# Patient Record
Sex: FEMALE | Race: WHITE
[De-identification: names, ages, dates, MRNs, and addresses within clinical notes are randomized per-mention and may not be internally consistent; named-entity substitution may affect disease eponyms.]

---

## 2018-07-30 ENCOUNTER — HOSPITAL ENCOUNTER (EMERGENCY)
Dept: HOSPITAL 8 - ED | Age: 23
LOS: 1 days | Discharge: HOME | End: 2018-07-31
Payer: MEDICAID

## 2018-07-30 VITALS — SYSTOLIC BLOOD PRESSURE: 95 MMHG | DIASTOLIC BLOOD PRESSURE: 50 MMHG

## 2018-07-30 VITALS — WEIGHT: 138.89 LBS | BODY MASS INDEX: 26.22 KG/M2 | HEIGHT: 61 IN

## 2018-07-30 DIAGNOSIS — O20.0: Primary | ICD-10-CM

## 2018-07-30 DIAGNOSIS — Z3A.01: ICD-10-CM

## 2018-07-30 LAB
BASOPHILS # BLD AUTO: 0.04 X10^3/UL (ref 0–0.1)
BASOPHILS NFR BLD AUTO: 1 % (ref 0–1)
EOSINOPHIL # BLD AUTO: 0.04 X10^3/UL (ref 0–0.4)
EOSINOPHIL NFR BLD AUTO: 1 % (ref 1–7)
ERYTHROCYTE [DISTWIDTH] IN BLOOD BY AUTOMATED COUNT: 14.8 % (ref 9.6–15.2)
LYMPHOCYTES # BLD AUTO: 2.01 X10^3/UL (ref 1–3.4)
LYMPHOCYTES NFR BLD AUTO: 31 % (ref 22–44)
MCH RBC QN AUTO: 28.5 PG (ref 27–34.8)
MCHC RBC AUTO-ENTMCNC: 33.8 G/DL (ref 32.4–35.8)
MCV RBC AUTO: 84.4 FL (ref 80–100)
MD: NO
MONOCYTES # BLD AUTO: 0.41 X10^3/UL (ref 0.2–0.8)
MONOCYTES NFR BLD AUTO: 6 % (ref 2–9)
NEUTROPHILS # BLD AUTO: 4.1 X10^3/UL (ref 1.8–6.8)
NEUTROPHILS NFR BLD AUTO: 62 % (ref 42–75)
PLATELET # BLD AUTO: 349 X10^3/UL (ref 130–400)
PMV BLD AUTO: 9 FL (ref 7.4–10.4)
RBC # BLD AUTO: 4.23 X10^6/UL (ref 3.82–5.3)

## 2018-07-30 PROCEDURE — 36415 COLL VENOUS BLD VENIPUNCTURE: CPT

## 2018-07-30 PROCEDURE — 84702 CHORIONIC GONADOTROPIN TEST: CPT

## 2018-07-30 PROCEDURE — 86901 BLOOD TYPING SEROLOGIC RH(D): CPT

## 2018-07-30 PROCEDURE — 76801 OB US < 14 WKS SINGLE FETUS: CPT

## 2018-07-30 PROCEDURE — 99285 EMERGENCY DEPT VISIT HI MDM: CPT

## 2018-07-30 PROCEDURE — 81001 URINALYSIS AUTO W/SCOPE: CPT

## 2018-07-30 PROCEDURE — 80053 COMPREHEN METABOLIC PANEL: CPT

## 2018-07-30 PROCEDURE — 85025 COMPLETE CBC W/AUTO DIFF WBC: CPT

## 2018-07-30 PROCEDURE — 87086 URINE CULTURE/COLONY COUNT: CPT

## 2018-07-31 LAB
ALBUMIN SERPL-MCNC: 3.5 G/DL (ref 3.4–5)
ALP SERPL-CCNC: 79 U/L (ref 45–117)
ALT SERPL-CCNC: 25 U/L (ref 12–78)
ANION GAP SERPL CALC-SCNC: 7 MMOL/L (ref 5–15)
BILIRUB SERPL-MCNC: 0.1 MG/DL (ref 0.2–1)
CALCIUM SERPL-MCNC: 8.8 MG/DL (ref 8.5–10.1)
CHLORIDE SERPL-SCNC: 107 MMOL/L (ref 98–107)
CREAT SERPL-MCNC: 0.62 MG/DL (ref 0.55–1.02)
CULTURE INDICATED?: YES
MICROSCOPIC: (no result)
PROT SERPL-MCNC: 7.7 G/DL (ref 6.4–8.2)

## 2018-09-07 ENCOUNTER — HOSPITAL ENCOUNTER (OUTPATIENT)
Facility: MEDICAL CENTER | Age: 23
End: 2018-09-10
Attending: EMERGENCY MEDICINE | Admitting: SPECIALIST
Payer: MEDICAID

## 2018-09-07 ENCOUNTER — HOSPITAL ENCOUNTER (OUTPATIENT)
Dept: RADIOLOGY | Facility: MEDICAL CENTER | Age: 23
End: 2018-09-07

## 2018-09-07 DIAGNOSIS — O20.0 THREATENED SPONTANEOUS ABORTION: ICD-10-CM

## 2018-09-07 DIAGNOSIS — O20.0 THREATENED MISCARRIAGE IN EARLY PREGNANCY: ICD-10-CM

## 2018-09-07 DIAGNOSIS — R10.2 PELVIC PAIN: Primary | ICD-10-CM

## 2018-09-07 LAB
BASOPHILS # BLD AUTO: 0.4 % (ref 0–1.8)
BASOPHILS # BLD: 0.02 K/UL (ref 0–0.12)
EOSINOPHIL # BLD AUTO: 0.02 K/UL (ref 0–0.51)
EOSINOPHIL NFR BLD: 0.4 % (ref 0–6.9)
ERYTHROCYTE [DISTWIDTH] IN BLOOD BY AUTOMATED COUNT: 42.8 FL (ref 35.9–50)
HCT VFR BLD AUTO: 32.4 % (ref 37–47)
HGB BLD-MCNC: 10.4 G/DL (ref 12–16)
IMM GRANULOCYTES # BLD AUTO: 0.01 K/UL (ref 0–0.11)
IMM GRANULOCYTES NFR BLD AUTO: 0.2 % (ref 0–0.9)
LYMPHOCYTES # BLD AUTO: 1.83 K/UL (ref 1–4.8)
LYMPHOCYTES NFR BLD: 32.9 % (ref 22–41)
MCH RBC QN AUTO: 27.7 PG (ref 27–33)
MCHC RBC AUTO-ENTMCNC: 32.1 G/DL (ref 33.6–35)
MCV RBC AUTO: 86.4 FL (ref 81.4–97.8)
MONOCYTES # BLD AUTO: 0.38 K/UL (ref 0–0.85)
MONOCYTES NFR BLD AUTO: 6.8 % (ref 0–13.4)
NEUTROPHILS # BLD AUTO: 3.31 K/UL (ref 2–7.15)
NEUTROPHILS NFR BLD: 59.3 % (ref 44–72)
NRBC # BLD AUTO: 0 K/UL
NRBC BLD-RTO: 0 /100 WBC
PLATELET # BLD AUTO: 275 K/UL (ref 164–446)
PMV BLD AUTO: 10.9 FL (ref 9–12.9)
RBC # BLD AUTO: 3.75 M/UL (ref 4.2–5.4)
WBC # BLD AUTO: 5.6 K/UL (ref 4.8–10.8)

## 2018-09-07 PROCEDURE — 85025 COMPLETE CBC W/AUTO DIFF WBC: CPT

## 2018-09-07 PROCEDURE — G0378 HOSPITAL OBSERVATION PER HR: HCPCS

## 2018-09-07 PROCEDURE — A9270 NON-COVERED ITEM OR SERVICE: HCPCS | Performed by: EMERGENCY MEDICINE

## 2018-09-07 PROCEDURE — 700105 HCHG RX REV CODE 258: Performed by: SPECIALIST

## 2018-09-07 PROCEDURE — 96375 TX/PRO/DX INJ NEW DRUG ADDON: CPT

## 2018-09-07 PROCEDURE — 96376 TX/PRO/DX INJ SAME DRUG ADON: CPT

## 2018-09-07 PROCEDURE — 700111 HCHG RX REV CODE 636 W/ 250 OVERRIDE (IP): Performed by: EMERGENCY MEDICINE

## 2018-09-07 PROCEDURE — 99285 EMERGENCY DEPT VISIT HI MDM: CPT

## 2018-09-07 PROCEDURE — 700102 HCHG RX REV CODE 250 W/ 637 OVERRIDE(OP): Performed by: EMERGENCY MEDICINE

## 2018-09-07 PROCEDURE — 96374 THER/PROPH/DIAG INJ IV PUSH: CPT

## 2018-09-07 PROCEDURE — 700111 HCHG RX REV CODE 636 W/ 250 OVERRIDE (IP): Performed by: SPECIALIST

## 2018-09-07 RX ORDER — ONDANSETRON 2 MG/ML
4 INJECTION INTRAMUSCULAR; INTRAVENOUS ONCE
Status: COMPLETED | OUTPATIENT
Start: 2018-09-07 | End: 2018-09-07

## 2018-09-07 RX ORDER — ONDANSETRON 2 MG/ML
4 INJECTION INTRAMUSCULAR; INTRAVENOUS EVERY 4 HOURS PRN
Status: DISCONTINUED | OUTPATIENT
Start: 2018-09-07 | End: 2018-09-10 | Stop reason: HOSPADM

## 2018-09-07 RX ORDER — MORPHINE SULFATE 10 MG/ML
2 INJECTION, SOLUTION INTRAMUSCULAR; INTRAVENOUS ONCE
Status: COMPLETED | OUTPATIENT
Start: 2018-09-07 | End: 2018-09-07

## 2018-09-07 RX ORDER — MORPHINE SULFATE 10 MG/ML
2 INJECTION, SOLUTION INTRAMUSCULAR; INTRAVENOUS
Status: DISCONTINUED | OUTPATIENT
Start: 2018-09-07 | End: 2018-09-07

## 2018-09-07 RX ORDER — ACETAMINOPHEN 325 MG/1
650 TABLET ORAL ONCE
Status: COMPLETED | OUTPATIENT
Start: 2018-09-07 | End: 2018-09-07

## 2018-09-07 RX ORDER — SODIUM CHLORIDE, SODIUM LACTATE, POTASSIUM CHLORIDE, CALCIUM CHLORIDE 600; 310; 30; 20 MG/100ML; MG/100ML; MG/100ML; MG/100ML
INJECTION, SOLUTION INTRAVENOUS CONTINUOUS
Status: DISCONTINUED | OUTPATIENT
Start: 2018-09-07 | End: 2018-09-10 | Stop reason: HOSPADM

## 2018-09-07 RX ORDER — MORPHINE SULFATE 10 MG/ML
5 INJECTION, SOLUTION INTRAMUSCULAR; INTRAVENOUS
Status: DISCONTINUED | OUTPATIENT
Start: 2018-09-07 | End: 2018-09-09

## 2018-09-07 RX ORDER — SODIUM CHLORIDE 9 MG/ML
INJECTION, SOLUTION INTRAVENOUS CONTINUOUS
Status: DISCONTINUED | OUTPATIENT
Start: 2018-09-07 | End: 2018-09-07

## 2018-09-07 RX ORDER — MORPHINE SULFATE 10 MG/ML
2 INJECTION, SOLUTION INTRAMUSCULAR; INTRAVENOUS
Status: DISCONTINUED | OUTPATIENT
Start: 2018-09-07 | End: 2018-09-10 | Stop reason: HOSPADM

## 2018-09-07 RX ADMIN — ONDANSETRON 4 MG: 2 INJECTION INTRAMUSCULAR; INTRAVENOUS at 13:25

## 2018-09-07 RX ADMIN — ACETAMINOPHEN 650 MG: 325 TABLET, FILM COATED ORAL at 12:37

## 2018-09-07 RX ADMIN — SODIUM CHLORIDE, POTASSIUM CHLORIDE, SODIUM LACTATE AND CALCIUM CHLORIDE: 600; 310; 30; 20 INJECTION, SOLUTION INTRAVENOUS at 21:23

## 2018-09-07 RX ADMIN — ONDANSETRON 4 MG: 2 INJECTION INTRAMUSCULAR; INTRAVENOUS at 21:09

## 2018-09-07 RX ADMIN — MORPHINE SULFATE 2 MG: 10 INJECTION INTRAVENOUS at 13:26

## 2018-09-07 RX ADMIN — MORPHINE SULFATE 2 MG: 10 INJECTION INTRAVENOUS at 17:46

## 2018-09-07 RX ADMIN — SODIUM CHLORIDE: 9 INJECTION, SOLUTION INTRAVENOUS at 17:46

## 2018-09-07 RX ADMIN — MORPHINE SULFATE 2 MG: 10 INJECTION INTRAVENOUS at 21:08

## 2018-09-07 ASSESSMENT — COGNITIVE AND FUNCTIONAL STATUS - GENERAL
SUGGESTED CMS G CODE MODIFIER DAILY ACTIVITY: CH
SUGGESTED CMS G CODE MODIFIER MOBILITY: CH
MOBILITY SCORE: 24
DAILY ACTIVITIY SCORE: 24

## 2018-09-07 ASSESSMENT — PAIN SCALES - GENERAL
PAINLEVEL_OUTOF10: 0
PAINLEVEL_OUTOF10: 3
PAINLEVEL_OUTOF10: 7
PAINLEVEL_OUTOF10: 7

## 2018-09-07 ASSESSMENT — PATIENT HEALTH QUESTIONNAIRE - PHQ9
2. FEELING DOWN, DEPRESSED, IRRITABLE, OR HOPELESS: NOT AT ALL
SUM OF ALL RESPONSES TO PHQ9 QUESTIONS 1 AND 2: 0
1. LITTLE INTEREST OR PLEASURE IN DOING THINGS: NOT AT ALL

## 2018-09-07 ASSESSMENT — LIFESTYLE VARIABLES
ALCOHOL_USE: NO
EVER_SMOKED: YES

## 2018-09-07 NOTE — ED TRIAGE NOTES
Patient arrived from [facility] as transfer per request of patients OB/GYN Dr. Reyes.   Patient had ultrasound and pelvic exam completed PTA with large clot removed. Patient arrives alert and oriented x4, placed on cardiac monitors,VSS MD to evaluate. Patient reports bleeding has slowed down however, cramping continues.

## 2018-09-07 NOTE — ED PROVIDER NOTES
ED Provider Note    Scribed for Razia Salcido M.D. by Samson Marcus. 9/7/2018, 12:18 PM.    Primary care provider: No primary care provider on file.  Means of arrival: Ambulance  History obtained from: Patient  History limited by: None    CHIEF COMPLAINT  Chief Complaint   Patient presents with   • Vaginal Bleeding     transfer from [facility] for miscarriage, 14 weeks pregnant bleeding since this morning     GODWIN Troncoso is a 22 y.o. female who presents to the Emergency Department for vaginal bleeding.  She has a history of a pregnancy twin gestation.  The patient had an ultrasound July 27th that showed twin pregnancy at 5 weeks, subchorionic hemorroghage with slow fetal heart rate.  August 28 follow up US showed fetal heart rate of one baby had a heart rate of 107 at 10 weeks 2 days and second twin showed no fetal heart rate, no subchorionic hemorrhage noted.  US today shows 11 week 3 day pregnancy with small subchorionic hemorrhage with drop of ECG.  The patient states she was seen in [place] removed a large fist-sized clot earlier today Her Hemoglobin at the transferring facility today was 11.      REVIEW OF SYSTEMS  Pertinent positives include vaginal bleeding. Pertinent negatives include no fever. All other systems reviewed and negative.     C.    PAST MEDICAL HISTORY   None Noted    SURGICAL HISTORY  patient denies any surgical history    SOCIAL HISTORY  Social History   Substance Use Topics   • Smoking status: Never Smoker   • Smokeless tobacco: Never Used   • Alcohol use No      History   Drug Use No       FAMILY HISTORY  History reviewed. No pertinent family history.    CURRENT MEDICATIONS  No current facility-administered medications for this encounter.     Current Outpatient Prescriptions:   •  Prenatal MV-Min-Fe Fum-FA-DHA (PRENATAL 1 PO), Take 1 Tab by mouth every evening., Disp: , Rfl:     ALLERGIES  No Known Allergies    PHYSICAL EXAM  VITAL SIGNS: /63   Pulse 96   Temp  35.8 °C (96.5 °F)   Resp 18   Wt 65.8 kg (145 lb)   LMP 06/15/2018 (Exact Date)   SpO2 99%     Constitutional:  Laying in bed, comfortable, able to answer questions  HENT: Nose is normal in appearance without rhinorrhea, external ears are normal,  moist mucous membranes  Eyes: Anicteric,  pupils are equal round and reactive, there is no conjunctival drainage or pallor   Neck: The trachea is midline, there is no obvious mass or meningeal signs  Cardiovascular: Equal radial pulsation.  Thorax & Lungs: Respiratory rate and effort are normal. There is normal chest excursion with respiration.  Abdomen: Abdomen is normal in appearance.  :  No CVA tenderness to palpation.  Minimal active bleeding and no tissue in the os.  Musculoskeletal: No deformities noted in all 4 extremities. Actively moves all 4 extremities  Skin: Visualized skin is warm, no erythema, no rash.  Neurologic:  Cranial nerves II through XII are intact there is no focal abnormality noted.  Psychiatric: Normal mood and mentation     DIAGNOSTIC STUDIES / PROCEDURES      LABS  Results for orders placed or performed during the hospital encounter of 09/07/18   CBC WITH DIFFERENTIAL   Result Value Ref Range    WBC 5.6 4.8 - 10.8 K/uL    RBC 3.75 (L) 4.20 - 5.40 M/uL    Hemoglobin 10.4 (L) 12.0 - 16.0 g/dL    Hematocrit 32.4 (L) 37.0 - 47.0 %    MCV 86.4 81.4 - 97.8 fL    MCH 27.7 27.0 - 33.0 pg    MCHC 32.1 (L) 33.6 - 35.0 g/dL    RDW 42.8 35.9 - 50.0 fL    Platelet Count 275 164 - 446 K/uL    MPV 10.9 9.0 - 12.9 fL    Neutrophils-Polys 59.30 44.00 - 72.00 %    Lymphocytes 32.90 22.00 - 41.00 %    Monocytes 6.80 0.00 - 13.40 %    Eosinophils 0.40 0.00 - 6.90 %    Basophils 0.40 0.00 - 1.80 %    Immature Granulocytes 0.20 0.00 - 0.90 %    Nucleated RBC 0.00 /100 WBC    Neutrophils (Absolute) 3.31 2.00 - 7.15 K/uL    Lymphs (Absolute) 1.83 1.00 - 4.80 K/uL    Monos (Absolute) 0.38 0.00 - 0.85 K/uL    Eos (Absolute) 0.02 0.00 - 0.51 K/uL    Baso (Absolute)  0.02 0.00 - 0.12 K/uL    Immature Granulocytes (abs) 0.01 0.00 - 0.11 K/uL    NRBC (Absolute) 0.00 K/uL       All labs reviewed by me.      RADIOLOGY  US-FOREIGN FILM ULTRASOUND   Final Result        The radiologist's interpretation of all radiological studies have been reviewed by me.    COURSE & MEDICAL DECISION MAKING  Nursing notes and vital signs were reviewed. (See chart for details)  The patient's  records were reviewed by me, history was obtained from the patient.     The patient presents with vaginal bleeding, and the differential diagnosis includes but is not limited to miscarriage of one twin and threatened miscarriage of second.      12:18 PM Patient presents with vaginal bleeding, was evaluated at [facility] and informed of ultrasound results.  The patient was informed that we would consult with Dr. Reyes would like her to be admitted to the hospital for observation.  She agrees with plan of care at this time.    12:25 PM I discussed the patient's case and the above findings with Dr. Reyes (OB/GYN) who was updated on the patient's status and agrees to see the patient, I have written holding orders for him.  She will have serial blood draws and bleeding and cramping monitored.      DISPOSITION:  Patient will be admitted to Dr. Reyes in guarded condition.     FINAL IMPRESSION  1. Threatened miscarriage in early pregnancy          ISamson (Scribisabel), am scribing for, and in the presence of, Razia Salcido M.D..    Electronically signed by: Samson Marcus (Maribeth), 9/7/2018    IRazia M.D. personally performed the services described in this documentation, as scribed by Samson Marcus in my presence, and it is both accurate and complete.    The note accurately reflects work and decisions made by me.  Razia Salcido  9/7/2018  5:43 PM

## 2018-09-07 NOTE — ED NOTES
The Medication Reconciliation process has been completed by interviewing the patient    Allergies have been reviewed  Antibiotic use in 30 days - one dose of an unknown ABX about 3 weeks ago.     Home Pharmacy:  [facility]

## 2018-09-07 NOTE — ED NOTES
"Patient c/o increase cramping and \"liquid leaking\", Dr. Salcido notified, orders received and medicated as per order.  Dr. Salcido at bedside and pelvic exam completed, no bleeding or drainage noted at this time.  Will continue to monitor.  "

## 2018-09-08 ENCOUNTER — APPOINTMENT (OUTPATIENT)
Dept: RADIOLOGY | Facility: MEDICAL CENTER | Age: 23
End: 2018-09-08
Attending: SPECIALIST
Payer: MEDICAID

## 2018-09-08 LAB
ANION GAP SERPL CALC-SCNC: 6 MMOL/L (ref 0–11.9)
BASOPHILS # BLD AUTO: 0.2 % (ref 0–1.8)
BASOPHILS # BLD: 0.01 K/UL (ref 0–0.12)
BUN SERPL-MCNC: 6 MG/DL (ref 8–22)
CALCIUM SERPL-MCNC: 8.5 MG/DL (ref 8.5–10.5)
CHLORIDE SERPL-SCNC: 105 MMOL/L (ref 96–112)
CO2 SERPL-SCNC: 23 MMOL/L (ref 20–33)
CREAT SERPL-MCNC: 0.51 MG/DL (ref 0.5–1.4)
EOSINOPHIL # BLD AUTO: 0.04 K/UL (ref 0–0.51)
EOSINOPHIL NFR BLD: 0.9 % (ref 0–6.9)
ERYTHROCYTE [DISTWIDTH] IN BLOOD BY AUTOMATED COUNT: 42.7 FL (ref 35.9–50)
GLUCOSE SERPL-MCNC: 95 MG/DL (ref 65–99)
HCT VFR BLD AUTO: 29.6 % (ref 37–47)
HGB BLD-MCNC: 9.8 G/DL (ref 12–16)
IMM GRANULOCYTES # BLD AUTO: 0.01 K/UL (ref 0–0.11)
IMM GRANULOCYTES NFR BLD AUTO: 0.2 % (ref 0–0.9)
LYMPHOCYTES # BLD AUTO: 1.43 K/UL (ref 1–4.8)
LYMPHOCYTES NFR BLD: 33.9 % (ref 22–41)
MAGNESIUM SERPL-MCNC: 2 MG/DL (ref 1.5–2.5)
MCH RBC QN AUTO: 28.3 PG (ref 27–33)
MCHC RBC AUTO-ENTMCNC: 33.1 G/DL (ref 33.6–35)
MCV RBC AUTO: 85.5 FL (ref 81.4–97.8)
MONOCYTES # BLD AUTO: 0.3 K/UL (ref 0–0.85)
MONOCYTES NFR BLD AUTO: 7.1 % (ref 0–13.4)
NEUTROPHILS # BLD AUTO: 2.43 K/UL (ref 2–7.15)
NEUTROPHILS NFR BLD: 57.7 % (ref 44–72)
NRBC # BLD AUTO: 0 K/UL
NRBC BLD-RTO: 0 /100 WBC
PLATELET # BLD AUTO: 228 K/UL (ref 164–446)
PMV BLD AUTO: 11 FL (ref 9–12.9)
POTASSIUM SERPL-SCNC: 3.9 MMOL/L (ref 3.6–5.5)
RBC # BLD AUTO: 3.46 M/UL (ref 4.2–5.4)
SODIUM SERPL-SCNC: 134 MMOL/L (ref 135–145)
WBC # BLD AUTO: 4.2 K/UL (ref 4.8–10.8)

## 2018-09-08 PROCEDURE — A9270 NON-COVERED ITEM OR SERVICE: HCPCS | Performed by: SPECIALIST

## 2018-09-08 PROCEDURE — 96376 TX/PRO/DX INJ SAME DRUG ADON: CPT

## 2018-09-08 PROCEDURE — 700102 HCHG RX REV CODE 250 W/ 637 OVERRIDE(OP): Performed by: SPECIALIST

## 2018-09-08 PROCEDURE — G0378 HOSPITAL OBSERVATION PER HR: HCPCS

## 2018-09-08 PROCEDURE — 80048 BASIC METABOLIC PNL TOTAL CA: CPT

## 2018-09-08 PROCEDURE — 700105 HCHG RX REV CODE 258: Performed by: SPECIALIST

## 2018-09-08 PROCEDURE — 36415 COLL VENOUS BLD VENIPUNCTURE: CPT

## 2018-09-08 PROCEDURE — 76815 OB US LIMITED FETUS(S): CPT

## 2018-09-08 PROCEDURE — 83735 ASSAY OF MAGNESIUM: CPT

## 2018-09-08 PROCEDURE — 85025 COMPLETE CBC W/AUTO DIFF WBC: CPT

## 2018-09-08 PROCEDURE — 700111 HCHG RX REV CODE 636 W/ 250 OVERRIDE (IP): Performed by: SPECIALIST

## 2018-09-08 RX ORDER — ACETAMINOPHEN 325 MG/1
650 TABLET ORAL EVERY 6 HOURS PRN
Status: DISCONTINUED | OUTPATIENT
Start: 2018-09-08 | End: 2018-09-10 | Stop reason: HOSPADM

## 2018-09-08 RX ADMIN — SODIUM CHLORIDE, POTASSIUM CHLORIDE, SODIUM LACTATE AND CALCIUM CHLORIDE: 600; 310; 30; 20 INJECTION, SOLUTION INTRAVENOUS at 22:17

## 2018-09-08 RX ADMIN — ONDANSETRON 4 MG: 2 INJECTION INTRAMUSCULAR; INTRAVENOUS at 18:14

## 2018-09-08 RX ADMIN — SODIUM CHLORIDE, POTASSIUM CHLORIDE, SODIUM LACTATE AND CALCIUM CHLORIDE: 600; 310; 30; 20 INJECTION, SOLUTION INTRAVENOUS at 10:16

## 2018-09-08 RX ADMIN — MORPHINE SULFATE 2 MG: 10 INJECTION INTRAVENOUS at 08:06

## 2018-09-08 RX ADMIN — MORPHINE SULFATE 2 MG: 10 INJECTION INTRAVENOUS at 15:21

## 2018-09-08 RX ADMIN — ACETAMINOPHEN 650 MG: 325 TABLET, FILM COATED ORAL at 18:14

## 2018-09-08 RX ADMIN — ACETAMINOPHEN 650 MG: 325 TABLET, FILM COATED ORAL at 12:25

## 2018-09-08 ASSESSMENT — PAIN SCALES - GENERAL
PAINLEVEL_OUTOF10: 0
PAINLEVEL_OUTOF10: 5
PAINLEVEL_OUTOF10: 3
PAINLEVEL_OUTOF10: 0
PAINLEVEL_OUTOF10: 0
PAINLEVEL_OUTOF10: 4
PAINLEVEL_OUTOF10: 0
PAINLEVEL_OUTOF10: 5

## 2018-09-08 NOTE — PROGRESS NOTES
Pt resting in bed throughout the morning. Complaining of headache and abdominal pain 5/10, administered medications. Friend is at bedside. No other complaints or signs of distress at this time. Hourly rounding in place.

## 2018-09-08 NOTE — PROGRESS NOTES
Dr. Reyes at bedside, aware of patient low BP, patient is asymptomatic. Patient c/o cramping, vaginal bleeding very minimal. Will medicate per MAR and offer heat pack. Will continue to monitor.

## 2018-09-08 NOTE — PROGRESS NOTES
Bedside report received with RN. Pt resting in bed, up to bathroom. Complaining of cramping in right lower quadrant and headache, 4/10 pain, medication administered. No other complaints or signs of distress. Call light and belongings within reach. Bed locked and in lowest position.

## 2018-09-08 NOTE — PROGRESS NOTES
The patient has had intermittent pelvic cramping pain. She just finished having an abdominal OB ultrasound and the report is not yet available but I was informed that this study does reveal fetal cardiac activity. The patient currently says she is having less pelvic cramping pain compared to when I saw her earlier today. She says that since yesterday her vaginal bleeding has been minimal to absent. The patient has at times been slightly hypotensive but not tachycardic. Her hemoglobin yesterday morning was 10.4 g/dL and her hemoglobin early this morning is 9.8 g/dL. Currently the patient appears well-developed and well-nourished and relaxed and alert and comfortable and in no apparent distress.  I explained to her that because ultrasound reveals that there is still a heartbeat and because her cramping seems to be somewhat less and because her vaginal bleeding appears to have abated at least for the time being that I would not recommend at this point in time that we proceed with any intervention such as D&C or D&E. I explained to her that I am hopeful am optimistic that her symptoms will not recur. I explained to her however that given the fact that she has had the symptoms (of heavy uterine bleeding and cramping pain) and because ultrasound has revealed some evidence of subchorionic hemorrhage that she is at increased risk for spontaneous miscarriage and she acknowledged this. I explained to her that I would like to have her at rest here in the hospital at least until tomorrow. She acknowledged this and agrees.  Roc Reyes M.D.

## 2018-09-08 NOTE — PROGRESS NOTES
Sadia is a very pleasant 22 year old primipara (para 1, with one previous vaginal delivery, and her son is 3 years old) who is pregnant and today she is 12 weeks and 0 days gestation. I last saw her when she came to the office 9 days ago, on August 29, 2018. This was her first visit with me. She told me that earlier this year she had three spontaneous abortions. She began having very heavy vaginal bleeding accompanied by pelvic cramping pain, and she she went to the ER in [place]. I was contacted by the ER physician at [facility] and she was transferred to [facility]. I was then contacted by Dr. Salcido here at [facility]. The patient tells me this afternoon that her bleeding and cramping pain is now much less than what it had been earlier today. The patient's vital signs are stable and she is afebrile and she appears well developed and well nourished and relaxed and alert and comfortable and in no apparent distress. Of note, I reviewed the images from the trans abdominal ultrasound performed at [facility]. It appears that there is an appropriate amount of amniotic fluid and that fetal cardiac activity was identified. It appears that there was a small subchorionic hemorrhage.   The patient's hemoglobin here at [facility] is 10.4 grams per deciliter and her platelet count is normal at 275,000. Her WBC is normal at 5.6  I explained to the patient that given her symptoms that she is having at least a threatened SAB if not an inevitable SAB. She says that in the ER at [facility] the ER physician performed a speculum exam and she says that the ER physician told her that some of the blood appeared to be more clear fluid. I explained to the patient that this would suggest that spontaneous rupture of membranes occurred, and I explained to her that the fact that the images that I reviewed seemed to show an appropriate amount of amniotic fluid surrounding baby suggests that spontaneous rupture of membranes has not  occurred.   I explained to the patient that I would like to observe her in the hospital at least until tomorrow and that it is possible that she might go on to have a complete or incomplete miscarriage. I explained to her that if she has an incomplete miscarriage that I would then need to perform a D&E or D&C in the operating room and I discussed this with her and she acknowledged what I explained to her.   Roc Reyes M.D.

## 2018-09-09 PROCEDURE — 700111 HCHG RX REV CODE 636 W/ 250 OVERRIDE (IP): Performed by: SPECIALIST

## 2018-09-09 PROCEDURE — 700102 HCHG RX REV CODE 250 W/ 637 OVERRIDE(OP): Performed by: SPECIALIST

## 2018-09-09 PROCEDURE — A9270 NON-COVERED ITEM OR SERVICE: HCPCS | Performed by: SPECIALIST

## 2018-09-09 PROCEDURE — G0378 HOSPITAL OBSERVATION PER HR: HCPCS

## 2018-09-09 PROCEDURE — 96376 TX/PRO/DX INJ SAME DRUG ADON: CPT

## 2018-09-09 RX ORDER — OXYCODONE HYDROCHLORIDE AND ACETAMINOPHEN 5; 325 MG/1; MG/1
1-2 TABLET ORAL EVERY 4 HOURS PRN
Status: DISCONTINUED | OUTPATIENT
Start: 2018-09-09 | End: 2018-09-10 | Stop reason: HOSPADM

## 2018-09-09 RX ORDER — FERROUS SULFATE 325(65) MG
325 TABLET ORAL
Status: DISCONTINUED | OUTPATIENT
Start: 2018-09-10 | End: 2018-09-10 | Stop reason: HOSPADM

## 2018-09-09 RX ORDER — AMOXICILLIN 250 MG
1 CAPSULE ORAL 2 TIMES DAILY
Status: DISCONTINUED | OUTPATIENT
Start: 2018-09-09 | End: 2018-09-10 | Stop reason: HOSPADM

## 2018-09-09 RX ADMIN — MORPHINE SULFATE 2 MG: 10 INJECTION INTRAVENOUS at 11:22

## 2018-09-09 RX ADMIN — OXYCODONE HYDROCHLORIDE AND ACETAMINOPHEN 1 TABLET: 5; 325 TABLET ORAL at 13:19

## 2018-09-09 RX ADMIN — OXYCODONE HYDROCHLORIDE AND ACETAMINOPHEN 1 TABLET: 5; 325 TABLET ORAL at 20:21

## 2018-09-09 RX ADMIN — ACETAMINOPHEN 650 MG: 325 TABLET, FILM COATED ORAL at 07:48

## 2018-09-09 RX ADMIN — ONDANSETRON 4 MG: 2 INJECTION INTRAMUSCULAR; INTRAVENOUS at 21:35

## 2018-09-09 RX ADMIN — ONDANSETRON 4 MG: 2 INJECTION INTRAMUSCULAR; INTRAVENOUS at 11:25

## 2018-09-09 RX ADMIN — SENNOSIDES AND DOCUSATE SODIUM 1 TABLET: 8.6; 5 TABLET ORAL at 18:57

## 2018-09-09 ASSESSMENT — PAIN SCALES - GENERAL
PAINLEVEL_OUTOF10: 4
PAINLEVEL_OUTOF10: 3
PAINLEVEL_OUTOF10: 3
PAINLEVEL_OUTOF10: 0
PAINLEVEL_OUTOF10: 0
PAINLEVEL_OUTOF10: 5
PAINLEVEL_OUTOF10: 0
PAINLEVEL_OUTOF10: 7
PAINLEVEL_OUTOF10: 5
PAINLEVEL_OUTOF10: 6

## 2018-09-09 NOTE — CARE PLAN
Problem: Safety  Goal: Will remain free from injury    Intervention: Provide assistance with mobility  reinterated patient to ask for assistance if needed to the bathroom or ambulate.  Call button if any signs of increased bleeding and/or contractions or cramping increases.

## 2018-09-09 NOTE — CARE PLAN
Problem: Communication  Goal: The ability to communicate needs accurately and effectively will improve    Intervention: Educate patient and significant other/support system about the plan of care, procedures, treatments, medications and allow for questions  Educate the patient on her medications and LR IV fluids. The importance of putting on call light with any increased pain or bleeding beyond baseline.

## 2018-09-09 NOTE — PROGRESS NOTES
Assumed care report received. Pt resting in bed  5/10 pain in head and cramping in lower abdomen. Gave medication See MAR. Complains of clots 3-4 for total size of silver dollar noticed in underwear. Notified OB MD. No new orders at this time.  Plan of care discussed no other concerns at this time. Call light within reach. Fall precautions in place.

## 2018-09-09 NOTE — PROGRESS NOTES
Complaining of worsening cramps in lower abdomen and sacrum. States similar to pain on admission but less severe. Gave medication see MAR. Discussed previous miscarriages and emotional status. Reassurance given. No other concerns at this time. Will continue to monitor.

## 2018-09-09 NOTE — CARE PLAN
Problem: Venous Thromboembolism (VTW)/Deep Vein Thrombosis (DVT) Prevention:  Goal: Patient will participate in Venous Thrombosis (VTE)/Deep Vein Thrombosis (DVT)Prevention Measures    Intervention: Assess and monitor for anticoagulation complications  Ambulatory, up self tolerates well. VTE risk 0. Order for SCD's received from MD. SCD's in place. Education provided on prevention of VTE. Verbalized understanding.

## 2018-09-09 NOTE — PROGRESS NOTES
ECG:  SR 16/08/36..No results found for: INR  No results found for: POCINR   no c/o cramping, bleeding, contractions.

## 2018-09-09 NOTE — PROGRESS NOTES
Sadia is a very pleasant 22 year old primipara (she has had 1 previous vaginal delivery) who was admitted 2 days ago. She is today 12 weeks and 2 days gestation. She was admitted because of very heavy uterine bleeding. Ultrasounds have showed 2 small subchorionic hemorrhages. Since admission her bleeding has been minimal. She tells me that very early this morning she did have a very small amount of bleeding which she described as spotting. She says she has had intermittent pelvic cramping pain. She says currently she is having no bleeding and no pelvic cramping pain. Yesterday's abdominal ultrasound revealed a single live intrauterine gestation with crown-rump length measurements corresponding to a gestational age of 12 weeks and 3 days gestation. This ultrasound revealed that there were 2 small subchorionic hemorrhages (measuring 2.8 x 0.6 x 2.6 cm and 2.7 x 1.1 x 0.8 cm). I personally reviewed the images from this ultrasound study and these images reveal that the fetus is surrounded by an appropriate amount of amniotic fluid. The report states that the fetal heart rate was 163 bpm.  The patient's vital signs are stable and she is afebrile. She appears well-developed and well-nourished and relaxed and alert and comfortable and in no apparent distress.  The patient's hemoglobin yesterday morning was 9.8 g/dL. This represents a decrease from 10.4 g/dL the previous day.  I spoke with the patient today about whether or not to discharge her home from the hospital. After our discussions she told me that she would like to stay in the hospital until tomorrow morning but that she would be agreeable to being discharged home tomorrow morning. I explained to her that she is anemic and that when we discharged her home that I would like to discharge her home with prescription for iron sulfate to treat her anemia and that I would also because of her anemia recommend that she continue to take daily prenatal vitamins. I explained  to her also that I would like her for her to take a stool softener 2 times per day.  Asked me to her also that I would like to switch her from the intravenous morphine that she has been receiving intermittently as treatment for her intermittent pelvic cramping pain to an oral analgesics such as Percocet and she agreed with this.  I explained to her that when we discharge her home tomorrow morning than I will write for her prescription for Percocet.  Roc Reyes M.D.

## 2018-09-09 NOTE — PROGRESS NOTES
Received report from SIDDHARTH Waddell.  Review of patients condition. Her baseline of pain and cramping due to possible miscarriage. All questions answered. Patient in bed with no c/o pain or bleeding.

## 2018-09-10 VITALS
RESPIRATION RATE: 16 BRPM | HEART RATE: 79 BPM | HEIGHT: 61 IN | WEIGHT: 159.61 LBS | DIASTOLIC BLOOD PRESSURE: 57 MMHG | TEMPERATURE: 97.7 F | SYSTOLIC BLOOD PRESSURE: 94 MMHG | BODY MASS INDEX: 30.14 KG/M2 | OXYGEN SATURATION: 97 %

## 2018-09-10 PROBLEM — O20.8 SUBCHORIONIC HEMORRHAGE IN FIRST TRIMESTER: Status: ACTIVE | Noted: 2018-09-10

## 2018-09-10 PROBLEM — O46.8X1 SUBCHORIONIC HEMORRHAGE IN FIRST TRIMESTER: Status: ACTIVE | Noted: 2018-09-10

## 2018-09-10 PROBLEM — D62 ACUTE POST-HEMORRHAGIC ANEMIA: Status: ACTIVE | Noted: 2018-09-10

## 2018-09-10 PROBLEM — O20.0 THREATENED SPONTANEOUS ABORTION: Status: ACTIVE | Noted: 2018-09-10

## 2018-09-10 PROBLEM — O41.8X10 SUBCHORIONIC HEMORRHAGE IN FIRST TRIMESTER: Status: ACTIVE | Noted: 2018-09-10

## 2018-09-10 LAB
ERYTHROCYTE [DISTWIDTH] IN BLOOD BY AUTOMATED COUNT: 41.5 FL (ref 35.9–50)
HCT VFR BLD AUTO: 31.1 % (ref 37–47)
HGB BLD-MCNC: 9.8 G/DL (ref 12–16)
MCH RBC QN AUTO: 27.3 PG (ref 27–33)
MCHC RBC AUTO-ENTMCNC: 31.5 G/DL (ref 33.6–35)
MCV RBC AUTO: 86.6 FL (ref 81.4–97.8)
PLATELET # BLD AUTO: 257 K/UL (ref 164–446)
PMV BLD AUTO: 10.8 FL (ref 9–12.9)
RBC # BLD AUTO: 3.59 M/UL (ref 4.2–5.4)
WBC # BLD AUTO: 5.4 K/UL (ref 4.8–10.8)

## 2018-09-10 PROCEDURE — 700102 HCHG RX REV CODE 250 W/ 637 OVERRIDE(OP): Performed by: SPECIALIST

## 2018-09-10 PROCEDURE — 85027 COMPLETE CBC AUTOMATED: CPT

## 2018-09-10 PROCEDURE — 36415 COLL VENOUS BLD VENIPUNCTURE: CPT

## 2018-09-10 PROCEDURE — 700111 HCHG RX REV CODE 636 W/ 250 OVERRIDE (IP): Performed by: SPECIALIST

## 2018-09-10 PROCEDURE — G0378 HOSPITAL OBSERVATION PER HR: HCPCS

## 2018-09-10 PROCEDURE — A9270 NON-COVERED ITEM OR SERVICE: HCPCS | Performed by: SPECIALIST

## 2018-09-10 RX ORDER — OXYCODONE HYDROCHLORIDE AND ACETAMINOPHEN 5; 325 MG/1; MG/1
1-2 TABLET ORAL EVERY 4 HOURS PRN
Qty: 28 TAB | Refills: 0 | Status: SHIPPED | OUTPATIENT
Start: 2018-09-10 | End: 2018-09-17

## 2018-09-10 RX ORDER — SENNA AND DOCUSATE SODIUM 50; 8.6 MG/1; MG/1
1 TABLET, FILM COATED ORAL DAILY
Qty: 30 TAB | Refills: 11 | Status: SHIPPED | OUTPATIENT
Start: 2018-09-10 | End: 2020-03-03

## 2018-09-10 RX ORDER — FERROUS SULFATE 325(65) MG
325 TABLET ORAL
Qty: 30 TAB | Refills: 2 | Status: SHIPPED | OUTPATIENT
Start: 2018-09-10 | End: 2020-03-03

## 2018-09-10 RX ORDER — ONDANSETRON 4 MG/1
4 TABLET, ORALLY DISINTEGRATING ORAL EVERY 4 HOURS PRN
Status: DISCONTINUED | OUTPATIENT
Start: 2018-09-10 | End: 2018-09-10 | Stop reason: HOSPADM

## 2018-09-10 RX ORDER — ONDANSETRON 4 MG/1
4 TABLET, ORALLY DISINTEGRATING ORAL EVERY 6 HOURS PRN
Qty: 30 TAB | Refills: 0 | Status: SHIPPED | OUTPATIENT
Start: 2018-09-10 | End: 2020-02-16

## 2018-09-10 RX ADMIN — SENNOSIDES AND DOCUSATE SODIUM 1 TABLET: 8.6; 5 TABLET ORAL at 06:00

## 2018-09-10 RX ADMIN — ONDANSETRON 4 MG: 4 TABLET, ORALLY DISINTEGRATING ORAL at 10:25

## 2018-09-10 ASSESSMENT — PAIN SCALES - GENERAL
PAINLEVEL_OUTOF10: 1
PAINLEVEL_OUTOF10: 1

## 2018-09-10 NOTE — DISCHARGE INSTRUCTIONS
Discharge Instructions    Discharged to home by car with relative. Discharged via wheelchair, hospital escort: Yes.  Special equipment needed: Not Applicable    Be sure to schedule a follow-up appointment with your primary care doctor or any specialists as instructed.     Discharge Plan: Follow up with Dr. Reyes and call or contact his office if you have any increased vaginal bleeding or pelvic cramping pain.  Diet Plan: Discussed  Activity Level: Discussed  Smoking Cessation Offered: Patient Counseled  Confirmed Follow up Appointment: Appointment Scheduled  Confirmed Symptoms Management: Discussed  Medication Reconciliation Updated: Yes  Influenza Vaccine Indication: Patient Refuses    I understand that a diet low in cholesterol, fat, and sodium is recommended for good health. Unless I have been given specific instructions below for another diet, I accept this instruction as my diet prescription.   Other diet:     Special Instructions: None    · Is patient discharged on Warfarin / Coumadin?   No     Depression / Suicide Risk    As you are discharged from this [facility], it is important to learn how to keep safe from harming yourself.    Recognize the warning signs:  · Abrupt changes in personality, positive or negative- including increase in energy   · Giving away possessions  · Change in eating patterns- significant weight changes-  positive or negative  · Change in sleeping patterns- unable to sleep or sleeping all the time   · Unwillingness or inability to communicate  · Depression  · Unusual sadness, discouragement and loneliness  · Talk of wanting to die  · Neglect of personal appearance   · Rebelliousness- reckless behavior  · Withdrawal from people/activities they love  · Confusion- inability to concentrate     If you or a loved one observes any of these behaviors or has concerns about self-harm, here's what you can do:  · Talk about it- your feelings and reasons for harming yourself  · Remove  any means that you might use to hurt yourself (examples: pills, rope, extension cords, firearm)  · Get professional help from the community (Mental Health, Substance Abuse, psychological counseling)  · Do not be alone:Call your Safe Contact- someone whom you trust who will be there for you.  · Call your local CRISIS HOTLINE 245-6465 or 128-886-8990  · Call your local Children's Mobile Crisis Response Team [place] (566) 194-6104 or wwwBusap  · Call the toll free National Suicide Prevention Hotlines   · National Suicide Prevention Lifeline 919-706-KASZ (2094)  · National Hope Line Network 800-SUICIDE (693-2284)  Pregnancy and Anemia  Anemia is a condition in which the concentration of red blood cells or hemoglobin in the blood is below normal. Hemoglobin is a substance in red blood cells that carries oxygen to the tissues of the body. Anemia results in not enough oxygen reaching these tissues.  Anemia during pregnancy is common because the fetus uses more iron and folic acid as it is developing. Your body may not produce enough red blood cells because of this. Also, during pregnancy, the liquid part of the blood (plasma) increases by about 50%, and the red blood cells increase by only 25%. This lowers the concentration of the red blood cells and creates a natural anemia-like situation.  What are the causes?  The most common cause of anemia during pregnancy is not having enough iron in the body to make red blood cells (iron deficiency anemia). Other causes may include:  · Folic acid deficiency.  · Vitamin B12 deficiency.  · Certain prescription or over-the-counter medicines.  · Certain medical conditions or infections that destroy red blood cells.  · A low platelet count and bleeding caused by antibodies that go through the placenta to the fetus from the mother’s blood.  What are the signs or symptoms?  Mild anemia may not be noticeable. If it becomes severe, symptoms may  include:  · Tiredness.  · Shortness of breath, especially with exercise.  · Weakness.  · Fainting.  · Pale looking skin.  · Headaches.  · Feeling a fast or irregular heartbeat (palpitations).  How is this diagnosed?  The type of anemia is usually diagnosed from your family and medical history and blood tests.  How is this treated?  Treatment of anemia during pregnancy depends on the cause of the anemia. Treatment can include:  · Supplements of iron, vitamin B12, or folic acid.  · A blood transfusion. This may be needed if blood loss is severe.  · Hospitalization. This may be needed if there is significant continual blood loss.  · Dietary changes.  Follow these instructions at home:  · Follow your dietitian's or health care provider's dietary recommendations.  · Increase your vitamin C intake. This will help the stomach absorb more iron.  · Eat a diet rich in iron. This would include foods such as:  ¨ Liver.  ¨ Beef.  ¨ Whole grain bread.  ¨ Eggs.  ¨ Dried fruit.  · Take iron and vitamins as directed by your health care provider.  · Eat green leafy vegetables. These are a good source of folic acid.  Contact a health care provider if:  · You have frequent or lasting headaches.  · You are looking pale.  · You are bruising easily.  Get help right away if:  · You have extreme weakness, shortness of breath, or chest pain.  · You become dizzy or have trouble concentrating.  · You have heavy vaginal bleeding.  · You develop a rash.  · You have bloody or black, tarry stools.  · You faint.  · You vomit up blood.  · You vomit repeatedly.  · You have abdominal pain.  · You have a fever or persistent symptoms for more than 2-3 days.  · You have a fever and your symptoms suddenly get worse.  · You are dehydrated.  This information is not intended to replace advice given to you by your health care provider. Make sure you discuss any questions you have with your health care provider.  Document Released: 12/15/2001 Document Revised:  05/25/2017 Document Reviewed: 07/30/2014  [employer] Interactive Patient Education © 2017 [employer].    Ondansetron tablets  What is this medicine?  ONDANSETRON (on DAN se ashley) is used to treat nausea and vomiting caused by chemotherapy. It is also used to prevent or treat nausea and vomiting after surgery.  This medicine may be used for other purposes; ask your health care provider or pharmacist if you have questions.  COMMON BRAND NAME(S): Zofran  What should I tell my health care provider before I take this medicine?  They need to know if you have any of these conditions:  -heart disease  -history of irregular heartbeat  -liver disease  -low levels of magnesium or potassium in the blood  -an unusual or allergic reaction to ondansetron, granisetron, other medicines, foods, dyes, or preservatives  -pregnant or trying to get pregnant  -breast-feeding  How should I use this medicine?  Take this medicine by mouth with a glass of water. Follow the directions on your prescription label. Take your doses at regular intervals. Do not take your medicine more often than directed.  Talk to your pediatrician regarding the use of this medicine in children. Special care may be needed.  Overdosage: If you think you have taken too much of this medicine contact a poison control center or emergency room at once.  NOTE: This medicine is only for you. Do not share this medicine with others.  What if I miss a dose?  If you miss a dose, take it as soon as you can. If it is almost time for your next dose, take only that dose. Do not take double or extra doses.  What may interact with this medicine?  Do not take this medicine with any of the following medications:  -apomorphine  -certain medicines for fungal infections like fluconazole, itraconazole, ketoconazole, posaconazole, voriconazole  -cisapride  -dofetilide  -dronedarone  -pimozide  -thioridazine  -ziprasidone  This medicine may also interact with the following  medications:  -carbamazepine  -certain medicines for depression, anxiety, or psychotic disturbances  -fentanyl  -linezolid  -MAOIs like Carbex, Eldepryl, Marplan, Nardil, and Parnate  -methylene blue (injected into a vein)  -other medicines that prolong the QT interval (cause an abnormal heart rhythm)  -phenytoin  -rifampicin  -tramadol  This list may not describe all possible interactions. Give your health care provider a list of all the medicines, herbs, non-prescription drugs, or dietary supplements you use. Also tell them if you smoke, drink alcohol, or use illegal drugs. Some items may interact with your medicine.  What should I watch for while using this medicine?  Check with your doctor or health care professional right away if you have any sign of an allergic reaction.  What side effects may I notice from receiving this medicine?  Side effects that you should report to your doctor or health care professional as soon as possible:  -allergic reactions like skin rash, itching or hives, swelling of the face, lips or tongue  -breathing problems  -confusion  -dizziness  -fast or irregular heartbeat  -feeling faint or lightheaded, falls  -fever and chills  -loss of balance or coordination  -seizures  -sweating  -swelling of the hands or feet  -tightness in the chest  -tremors  -unusually weak or tired  Side effects that usually do not require medical attention (report to your doctor or health care professional if they continue or are bothersome):  -constipation or diarrhea  -headache  This list may not describe all possible side effects. Call your doctor for medical advice about side effects. You may report side effects to FDA at 0-557-FDA-7165.  Where should I keep my medicine?  Keep out of the reach of children.  Store between 2 and 30 degrees C (36 and 86 degrees F). Throw away any unused medicine after the expiration date.  NOTE: This sheet is a summary. It may not cover all possible information. If you have  questions about this medicine, talk to your doctor, pharmacist, or health care provider.  © 2018 Elsevier/Gold Standard (2014-09-24 16:27:45)    Acetaminophen; Oxycodone tablets  What is this medicine?  ACETAMINOPHEN; OXYCODONE (a set a DYLON daysi fen; ox i KOE done) is a pain reliever. It is used to treat moderate to severe pain.  This medicine may be used for other purposes; ask your health care provider or pharmacist if you have questions.  COMMON BRAND NAME(S): Endocet, Magnacet, Narvox, Percocet, Perloxx, Primalev, Primlev, Roxicet, Xolox  What should I tell my health care provider before I take this medicine?  They need to know if you have any of these conditions:  -brain tumor  -Crohn's disease, inflammatory bowel disease, or ulcerative colitis  -drug abuse or addiction  -head injury  -heart or circulation problems  -if you often drink alcohol  -kidney disease or problems going to the bathroom  -liver disease  -lung disease, asthma, or breathing problems  -an unusual or allergic reaction to acetaminophen, oxycodone, other opioid analgesics, other medicines, foods, dyes, or preservatives  -pregnant or trying to get pregnant  -breast-feeding  How should I use this medicine?  Take this medicine by mouth with a full glass of water. Follow the directions on the prescription label. You can take it with or without food. If it upsets your stomach, take it with food. Take your medicine at regular intervals. Do not take it more often than directed.  A special MedGuide will be given to you by the pharmacist with each prescription and refill. Be sure to read this information carefully each time.  Talk to your pediatrician regarding the use of this medicine in children. Special care may be needed.  Overdosage: If you think you have taken too much of this medicine contact a poison control center or emergency room at once.  NOTE: This medicine is only for you. Do not share this medicine with others.  What if I miss a dose?  If  you miss a dose, take it as soon as you can. If it is almost time for your next dose, take only that dose. Do not take double or extra doses.  What may interact with this medicine?  This medicine may interact with the following medications:  -alcohol  -antihistamines for allergy, cough and cold  -antiviral medicines for HIV or AIDS  -atropine  -certain antibiotics like clarithromycin, erythromycin, linezolid, rifampin  -certain medicines for anxiety or sleep  -certain medicines for bladder problems like oxybutynin, tolterodine  -certain medicines for depression like amitriptyline, fluoxetine, sertraline  -certain medicines for fungal infections like ketoconazole, itraconazole, voriconazole  -certain medicines for migraine headache like almotriptan, eletriptan, frovatriptan, naratriptan, rizatriptan, sumatriptan, zolmitriptan  -certain medicines for nausea or vomiting like dolasetron, ondansetron, palonosetron  -certain medicines for Parkinson's disease like benztropine, trihexyphenidyl  -certain medicines for seizures like phenobarbital, phenytoin, primidone  -certain medicines for stomach problems like dicyclomine, hyoscyamine  -certain medicines for travel sickness like scopolamine  -diuretics  -general anesthetics like halothane, isoflurane, methoxyflurane, propofol  -ipratropium  -local anesthetics like lidocaine, pramoxine, tetracaine  -MAOIs like Carbex, Eldepryl, Marplan, Nardil, and Parnate  -medicines that relax muscles for surgery  -methylene blue  -nilotinib  -other medicines with acetaminophen  -other narcotic medicines for pain or cough  -phenothiazines like chlorpromazine, mesoridazine, prochlorperazine, thioridazine  This list may not describe all possible interactions. Give your health care provider a list of all the medicines, herbs, non-prescription drugs, or dietary supplements you use. Also tell them if you smoke, drink alcohol, or use illegal drugs. Some items may interact with your  medicine.  What should I watch for while using this medicine?  Tell your doctor or health care professional if your pain does not go away, if it gets worse, or if you have new or a different type of pain. You may develop tolerance to the medicine. Tolerance means that you will need a higher dose of the medication for pain relief. Tolerance is normal and is expected if you take this medicine for a long time.  Do not suddenly stop taking your medicine because you may develop a severe reaction. Your body becomes used to the medicine. This does NOT mean you are addicted. Addiction is a behavior related to getting and using a drug for a non-medical reason. If you have pain, you have a medical reason to take pain medicine. Your doctor will tell you how much medicine to take. If your doctor wants you to stop the medicine, the dose will be slowly lowered over time to avoid any side effects.  There are different types of narcotic medicines (opiates). If you take more than one type at the same time or if you are taking another medicine that also causes drowsiness, you may have more side effects. Give your health care provider a list of all medicines you use. Your doctor will tell you how much medicine to take. Do not take more medicine than directed. Call emergency for help if you have problems breathing or unusual sleepiness.  Do not take other medicines that contain acetaminophen with this medicine. Always read labels carefully. If you have questions, ask your doctor or pharmacist.  If you take too much acetaminophen get medical help right away. Too much acetaminophen can be very dangerous and cause liver damage. Even if you do not have symptoms, it is important to get help right away.  You may get drowsy or dizzy. Do not drive, use machinery, or do anything that needs mental alertness until you know how this medicine affects you. Do not stand or sit up quickly, especially if you are an older patient. This reduces the risk  of dizzy or fainting spells. Alcohol may interfere with the effect of this medicine. Avoid alcoholic drinks.  The medicine will cause constipation. Try to have a bowel movement at least every 2 to 3 days. If you do not have a bowel movement for 3 days, call your doctor or health care professional.  Your mouth may get dry. Chewing sugarless gum or sucking hard candy, and drinking plenty or water may help. Contact your doctor if the problem does not go away or is severe.  What side effects may I notice from receiving this medicine?  Side effects that you should report to your doctor or health care professional as soon as possible:  -allergic reactions like skin rash, itching or hives, swelling of the face, lips, or tongue  -breathing problems  -confusion  -redness, blistering, peeling or loosening of the skin, including inside the mouth  -signs and symptoms of liver injury like dark yellow or brown urine; general ill feeling or flu-like symptoms; light-colored stools; loss of appetite; nausea; right upper belly pain; unusually weak or tired; yellowing of the eyes or skin  -signs and symptoms of low blood pressure like dizziness; feeling faint or lightheaded, falls; unusually weak or tired  -trouble passing urine or change in the amount of urine  Side effects that usually do not require medical attention (report to your doctor or health care professional if they continue or are bothersome):  -constipation  -dry mouth  -nausea, vomiting  -tiredness  This list may not describe all possible side effects. Call your doctor for medical advice about side effects. You may report side effects to FDA at 5-139-FDA-6656.  Where should I keep my medicine?  Keep out of the reach of children. This medicine can be abused. Keep your medicine in a safe place to protect it from theft. Do not share this medicine with anyone. Selling or giving away this medicine is dangerous and against the law.  This medicine may cause accidental overdose  and death if it taken by other adults, children, or pets. Mix any unused medicine with a substance like cat litter or coffee grounds. Then throw the medicine away in a sealed container like a sealed bag or a coffee can with a lid. Do not use the medicine after the expiration date.  Store at room temperature between 20 and 25 degrees C (68 and 77 degrees F).  NOTE: This sheet is a summary. It may not cover all possible information. If you have questions about this medicine, talk to your doctor, pharmacist, or health care provider.  © 2018 Elsevier/Gold Standard (2016-09-12 21:48:12)    Senna tablets or capsules  What is this medicine?  SENNA (SEN uh) is a laxative. This medicine is used to relieve constipation. It may also be used to empty and prepare the bowel for surgery or examination.  This medicine may be used for other purposes; ask your health care provider or pharmacist if you have questions.  COMMON BRAND NAME(S): Black Draught, Ex-Lax, Lax-Pills, Perdiem, Senexon, Senna, Senna-Lax, Senna-Tabs, Senna-Time, SennaGen, Sennatural, Senokot, Senokot Extra Strength, Senokot Xtra, SenoSol, SenoSol-X, Uni-Cenna  What should I tell my health care provider before I take this medicine?  They need to know if you have any of these conditions  -appendicitis  -stomach pain or blockage  -vomiting  -an unusual or allergic reaction to senna, other medicines, foods, dyes, or preservatives  -pregnant or trying to get pregnant  -breast-feeding  How should I use this medicine?  Take this medicine by mouth with a full glass of water. Follow the directions on the package. Always take with plenty of water. Take exactly as directed. Do not take your medicine more often than directed.  Talk to your pediatrician regarding the use of this medicine in children. While this medicine may be prescribed for children as young as 2 years for selected conditions, precautions do apply.  Overdosage: If you think you have taken too much of this  medicine contact a poison control center or emergency room at once.  NOTE: This medicine is only for you. Do not share this medicine with others.  What if I miss a dose?  If you miss a dose, take it as soon as you can. If it is almost time for your next dose, take only that dose. Do not take double or extra doses.  What may interact with this medicine?  Interactions are not expected. However, this medicine may affect the time other medicines stay in the stomach. It is best not to take this medicine within 1 to 2 hours of taking other medicines.  This list may not describe all possible interactions. Give your health care provider a list of all the medicines, herbs, non-prescription drugs, or dietary supplements you use. Also tell them if you smoke, drink alcohol, or use illegal drugs. Some items may interact with your medicine.  What should I watch for while using this medicine?  Do not use this medicine for longer than directed by your doctor or health care professional. This medicine can be habit-forming. Long-term use can make your body depend on the laxative for regular bowel movements, damage the bowel, cause malnutrition, and problems with the amounts of water and salts in your body. If your constipation keeps returning, check with your doctor or health care professional.  What side effects may I notice from receiving this medicine?  Side effects that you should report to your doctor or health care professional as soon as possible:  -diarrhea  -muscle weakness  -nausea, vomiting  -unusual weight loss  Side effects that usually do not require medical attention (report to your doctor or health care professional if they continue or are bothersome):  -bloating  -discolored urine  -lower stomach discomfort or cramps  This list may not describe all possible side effects. Call your doctor for medical advice about side effects. You may report side effects to FDA at 3-676-FDA-2416.  Where should I keep my  medicine?  Keep out of the reach of children.  Store at room temperature between 15 and 30 degrees C (59 and 86 degrees F). Keep container tightly closed. Throw away any unused medicine after the expiration date.  NOTE: This sheet is a summary. It may not cover all possible information. If you have questions about this medicine, talk to your doctor, pharmacist, or health care provider.  © 2018 Elsevier/Gold Standard (2016-06-04 07:17:15)

## 2018-09-10 NOTE — PROGRESS NOTES
Shift report from SIDDHARTH Jackson. Updated information related to pregnancy and bleeding and Pain management from Morphine to percocet PO. Pt comfortable. All questions answered

## 2018-09-10 NOTE — CARE PLAN
Problem: Venous Thromboembolism (VTW)/Deep Vein Thrombosis (DVT) Prevention:  Goal: Patient will participate in Venous Thrombosis (VTE)/Deep Vein Thrombosis (DVT)Prevention Measures    Intervention: Ensure patient wears graduated elastic stockings (ARCADIO hose) and/or SCDs, if ordered, when in bed or chair (Remove at least once per shift for skin check)  Patient educated on SCD's and importance of reducing blood clots

## 2018-09-10 NOTE — CARE PLAN
Problem: Safety  Goal: Will remain free from injury    Intervention: Educate patient and significant other/support system about adaptive mobility strategies and safe transfers  Patient understands and educated on safety to reduce fall risk.

## 2018-09-10 NOTE — PROGRESS NOTES
The patient says that she has had light vaginal bleeding and she says that she has had intermittent cramping pain. She says that she feels fine otherwise. She says that she would like to be discharged home today.   The patient's vital signs are stable and she has been afebrile.   She appears well developed and well nourished and relaxed and alert and comfortable and in no apparent distress.   Labs: the patient's hemoglobin this morning was 9.8 grams per deciliter (unchanged from two days ago).   We will discharge home today with Rx's for percocet and iron sulfate and stool softener and Zofran.   I asked her to follow up with me in the office and to call or contact me at any time should she ever have any problems or questions or complaints.   Roc Reyes M.D.

## 2018-09-10 NOTE — PROGRESS NOTES
SBAR received from SIDDHARTH Chavez. Rajani RN reported cramping, but no bleeding overnight. Pt sleeping quietly. Family at bedside sleeping. POC: cardiac rhythm management, IV fluids, bedrest, bleeding management, possible D/C.

## 2018-09-10 NOTE — PROGRESS NOTES
Pt discharged to home, after nausea relieved by antiemetic. Ambulated to private vehicle. IV removed, by Aniceto MESSINA. Edu medications and anemia while pregnant given. Rx: written-given to Pt, Narcotics waiver/Info sheet signed.

## 2018-09-13 PROBLEM — M54.16 LUMBAR RADICULOPATHY: Status: ACTIVE | Noted: 2018-09-13

## 2018-10-16 NOTE — H&P
DATE OF ADMISSION:  2018    IDENTIFICATION:  The patient is a very pleasant 22-year-old primipara (para 1   and she has had 1 previous vaginal delivery) who is pregnant and is 12 weeks   and 0 days' gestation.    CHIEF COMPLAINT:  Sudden heavy vaginal bleeding.    HISTORY OF PRESENT ILLNESS:  The patient has had prenatal care with myself   during the course of this pregnancy and she is currently 12 weeks and 0 days'   gestation.  I last saw her when she came to the office 9 days previously,   namely on 2018.  This was her visit with me.  She told me that earlier   this year, she had had 3 spontaneous abortions.  She began having very heavy   vaginal bleeding accompanied by pelvic cramping pain and she went to the   emergency room in [place].  I was contacted by the emergency room   physician at [facility] and she was transferred to [facility] emergency   room.  I was contacted by Dr. Salcido at [facility].  The patient tells me that   in the afternoon of the day of admission, her bleeding and cramping is much   less compared to what it had been earlier in the day prior to her presenting   to the emergency room facility in [place].  The patient's vital signs are   stable and she is afebrile and she appears well-developed and well-nourished   and relaxed and alert and comfortable and in no apparent distress.  Of note, I   reviewed the images from the transabdominal ultrasound performed at [facility].  It appears that there is an appropriate amount of fluid surrounding   the fetus and fetal cardiac activity was identified.  It appeared that there   was a small subchorionic hemorrhage.  The patient's hemoglobin at [facility] is   10.4 g/dL and her platelet count is 275,000 and white count is 5.6.  I   explained to the patient that given her symptoms that she is having at least a   threatened spontaneous , if not then an inevitable spontaneous   .  She said that in the  emergency room of [facility], the emergency   room physician performed a speculum exam and said that some of the blood   appeared to be more clear fluid.  I explained to the patient that this would   suggest spontaneous rupture of membranes had occurred and I explained to her   the fact that the images from her ultrasound that I reviewed seemed to show an   appropriate amount of amniotic fluid surrounding baby suggest that   spontaneous rupture of membranes had not occurred.  I explained to the patient   that I wanted to observe her in the hospital at least until tomorrow and that   it was possible that she might go on to have a complete miscarriage.  She   acknowledged this.  I explained to her that if an incomplete miscarriage   occurred that I would need to perform a D and C or D and E in the operating   room under anesthesia and discussed this with her and she acknowledged this.    PAST MEDICAL HISTORY:  The patient says she used to have a history of   epilepsy, but has not had a seizure in years.    PAST SURGICAL HISTORY:  The patient had an appendectomy.    MEDICATIONS:  None.    ALLERGIES:  The patient says that she has no known drug allergies.    SOCIAL HISTORY:  The patient denies smoking.  She denies consuming alcoholic   beverages.  She denies the use of recreational drugs.    REVIEW OF SYSTEMS:  GENERAL:  No fevers or chills or sweats.  PULMONARY:  No coughing or wheezing or chest pain or shortness of breath.  CARDIOVASCULAR:  No palpitations or dyspnea or chest pain.  GASTROINTESTINAL:  No nausea, vomiting, diarrhea, constipation.  GENITOURINARY:  The patient had heavy vaginal bleeding and pelvic cramping   pain earlier today as described above.  MUSCULOSKELETAL:  No arthralgias or myalgias.  NEUROLOGIC:  No headaches or syncope or seizures.    PHYSICAL EXAMINATION:  VITAL SIGNS:  The patient's vital signs are stable and she is afebrile.  GENERAL:  Well-developed, well-nourished, in no apparent  distress.  CHEST:  Regular rate and rhythm.  No murmur.  LUNGS:  Clear to auscultation bilaterally.  ABDOMEN:  Soft, nontender, nondistended.  EXTREMITIES:  No clubbing, cyanosis or edema.  NEUROLOGICAL:  Nonfocal.    ASSESSMENT:  1.  Intrauterine pregnancy at 12 weeks and 0 days' gestation.  2.  Subchorionic hemorrhage, threatened spontaneous .    PLAN:  Please see above.  The patient is being admitted and observed and we   will proceed with D and C if the patient goes on to have an incomplete   miscarriage.       ____________________________________     MAURICIO HUIZAR MD    MED / [facility]    DD:  10/15/2018 17:31:33  DT:  10/15/2018 19:25:16    D#:  [id number]  Job#:  [id number]

## 2019-12-26 ENCOUNTER — HOSPITAL ENCOUNTER (EMERGENCY)
Facility: MEDICAL CENTER | Age: 24
End: 2019-12-26
Attending: EMERGENCY MEDICINE

## 2019-12-26 VITALS
TEMPERATURE: 97.3 F | BODY MASS INDEX: 28.22 KG/M2 | RESPIRATION RATE: 14 BRPM | DIASTOLIC BLOOD PRESSURE: 70 MMHG | OXYGEN SATURATION: 100 % | HEIGHT: 61 IN | WEIGHT: 149.47 LBS | HEART RATE: 77 BPM | SYSTOLIC BLOOD PRESSURE: 103 MMHG
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
## 2019-12-26 PROCEDURE — A9270 NON-COVERED ITEM OR SERVICE: HCPCS | Performed by: EMERGENCY MEDICINE

## 2019-12-26 PROCEDURE — 99283 EMERGENCY DEPT VISIT LOW MDM: CPT

## 2019-12-26 PROCEDURE — 700102 HCHG RX REV CODE 250 W/ 637 OVERRIDE(OP): Performed by: EMERGENCY MEDICINE

## 2019-12-26 RX ORDER — IBUPROFEN 600 MG/1
600 TABLET ORAL ONCE
Status: COMPLETED | OUTPATIENT
Start: 2019-12-26 | End: 2019-12-26

## 2019-12-26 RX ORDER — AMOXICILLIN AND CLAVULANATE POTASSIUM 875; 125 MG/1; MG/1
1 TABLET, FILM COATED ORAL 2 TIMES DAILY
Qty: 10 TAB | Refills: 0 | Status: SHIPPED | OUTPATIENT
Start: 2019-12-26 | End: 2019-12-31

## 2019-12-26 RX ORDER — AMOXICILLIN AND CLAVULANATE POTASSIUM 875; 125 MG/1; MG/1
1 TABLET, FILM COATED ORAL ONCE
Status: COMPLETED | OUTPATIENT
Start: 2019-12-26 | End: 2019-12-26

## 2019-12-26 RX ADMIN — IBUPROFEN 600 MG: 600 TABLET ORAL at 10:57

## 2019-12-26 RX ADMIN — AMOXICILLIN AND CLAVULANATE POTASSIUM 1 TABLET: 875; 125 TABLET, FILM COATED ORAL at 10:57

## 2019-12-26 ASSESSMENT — ENCOUNTER SYMPTOMS
COUGH: 0
VOMITING: 0
FEVER: 0

## 2019-12-26 ASSESSMENT — LIFESTYLE VARIABLES: DO YOU DRINK ALCOHOL: NO

## 2019-12-26 NOTE — DISCHARGE INSTRUCTIONS
If you develop redness, swelling, or drainage to the cat bite areas please return to the ED.  If you develop systemic symptoms such as body aches or fever, please return to the ED.  Otherwise, see your primary care doctor in 2 days for wound check.

## 2019-12-26 NOTE — ED PROVIDER NOTES
"ED Provider Note    ED Provider Note    Primary care provider: Kishan Alanis M.D.  Means of arrival: OPOV  History obtained from: patient  History limited by: NOne    CHIEF COMPLAINT  Chief Complaint   Patient presents with   • Cat Bite     pt reports her cat, which was lost for the last couple months, was recently found and \"went ferile and crazy biting me all over my arms\".  This happened 45 minutes ago.  Pt has puncture wounds to bilat hands and L forearm.       GODWIN Troncoso is a 24 y.o. female who presents to the Emergency Department with her  and 2 children with a chief complaint of a cat bite.  This cat is known to her.  It is their cat although it did run away for a few weeks and returned a few days ago.  They adopted it from a friend and unsure about his immunizations.  The patient's tetanus shot is up-to-date.  This morning, she was  the cat and the dog fighting when she sustained, multiple small puncture wounds, abrasions to her bilateral hands and forearms.  She is otherwise been in her normal state of health.  She denies any recent fever, cough vomiting chest pain    REVIEW OF SYSTEMS  Review of Systems   Constitutional: Negative for fever.   Respiratory: Negative for cough.    Cardiovascular: Negative for chest pain.   Gastrointestinal: Negative for vomiting.   All other systems reviewed and are negative.      PAST MEDICAL HISTORY   Patient denies any past medical history.    SURGICAL HISTORY  patient denies any surgical history    SOCIAL HISTORY  Social History     Tobacco Use   • Smoking status: Never Smoker   • Smokeless tobacco: Never Used   Substance Use Topics   • Alcohol use: No   • Drug use: No      Social History     Substance and Sexual Activity   Drug Use No       FAMILY HISTORY  No family history on file.    CURRENT MEDICATIONS  Home Medications    **Home medications have not yet been reviewed for this encounter**         ALLERGIES  No Known Allergies    PHYSICAL " "EXAM  VITAL SIGNS: /70   Pulse 77   Temp 36.3 °C (97.3 °F) (Temporal)   Resp 14   Ht 1.549 m (5' 1\")   Wt 67.8 kg (149 lb 7.6 oz)   LMP 12/05/2019 (Approximate)   SpO2 100%   Breastfeeding? Unknown   BMI 28.24 kg/m²   Vitals reviewed.  Constitutional: Patient is oriented to person, place, and time. Appears well-developed and well-nourished. No distress.    Head: Normocephalic and atraumatic.   Eyes: Conjunctivae are normal.   Neck: Normal range of motion.   Cardiovascular: Normal rate, regular rhythm and normal heart sounds. Normal peripheral pulses, bilateral upper extremity.  Pulmonary/Chest: Effort normal and breath sounds normal. No respiratory distress, no wheezes.  Musculoskeletal: No edema.  No deformity  Neurological: No focal deficits.   Skin: Skin is warm and dry, except as noted.  There are scattered, punctate and small puncture wounds and minor abrasions approximately 8 sites in total over her bilateral distal forearms and hands.  No surrounding erythema or cellulitis or swelling at this time.  She has normal range of motion of her bilateral hands and wrists.   Psychiatric: Patient has a normal mood and affect.       COURSE & MEDICAL DECISION MAKING  Pertinent Labs & Imaging studies reviewed. (See chart for details)    Obtained and reviewed past medical records.  Patient's last encounter was an outpatient visit with Dr. child for low back pain.    10:37 AM - Patient seen and examined at bedside.  This is a well-appearing 24-year-old female who presents with her  and children with a chief complaint of cat bites and scratches to her bilateral forearms and hands.  There is no evidence of infection at this time.  However, I have impressed upon her, the high frequency of infections associated with cat bites.  She will be placed prophylactically on Augmentin.  She denies any allergies.  Her tetanus shot is up-to-date.  She is advised on warm moist compresses to the areas over the next few " days.  If she develops any redness, swelling, systemic symptoms such as fever, myalgias or vomiting, she should return immediately to the emergency room.  She is agreeable to this plan of care.  She will be treated with Motrin.  Once medicated, I feel she can safely be discharged home.  She is given strict return precautions.  Patient is in stable condition at this time.    FINAL IMPRESSION  1. Cat bite, initial encounter

## 2019-12-26 NOTE — ED TRIAGE NOTES
"Sadia Troncoso 24 y.o. female ambulatory to triage for     Chief Complaint   Patient presents with   • Cat Bite     pt reports her cat, which was lost for the last couple months, was recently found and \"went ferile and crazy biting me all over my arms\".  This happened 45 minutes ago.  Pt has puncture wounds to bilat hands and L forearm.     /70   Pulse 77   Temp 36.3 °C (97.3 °F) (Temporal)   Resp 14   Ht 1.549 m (5' 1\")   Wt 67.8 kg (149 lb 7.6 oz)   LMP 12/05/2019 (Approximate)   SpO2 100%   Breastfeeding? Unknown   BMI 28.24 kg/m²    Returned to the lobby to await bed assignment.  Advised to return to the triage desk for any changes/concerns.  "

## 2020-02-10 ENCOUNTER — HOSPITAL ENCOUNTER (EMERGENCY)
Facility: MEDICAL CENTER | Age: 25
End: 2020-02-11
Attending: EMERGENCY MEDICINE

## 2020-02-10 DIAGNOSIS — F07.81 POST CONCUSSIVE SYNDROME: ICD-10-CM

## 2020-02-10 PROCEDURE — 99284 EMERGENCY DEPT VISIT MOD MDM: CPT

## 2020-02-11 ENCOUNTER — APPOINTMENT (OUTPATIENT)
Dept: RADIOLOGY | Facility: MEDICAL CENTER | Age: 25
End: 2020-02-11
Attending: EMERGENCY MEDICINE

## 2020-02-11 VITALS
SYSTOLIC BLOOD PRESSURE: 97 MMHG | OXYGEN SATURATION: 97 % | HEIGHT: 61 IN | DIASTOLIC BLOOD PRESSURE: 64 MMHG | HEART RATE: 74 BPM | WEIGHT: 150.13 LBS | TEMPERATURE: 98.2 F | RESPIRATION RATE: 17 BRPM | BODY MASS INDEX: 28.35 KG/M2

## 2020-02-11 PROCEDURE — 70450 CT HEAD/BRAIN W/O DYE: CPT

## 2020-02-11 PROCEDURE — 700111 HCHG RX REV CODE 636 W/ 250 OVERRIDE (IP): Performed by: EMERGENCY MEDICINE

## 2020-02-11 RX ORDER — PROMETHAZINE HYDROCHLORIDE 25 MG/1
25 TABLET ORAL EVERY 6 HOURS PRN
Qty: 30 TAB | Refills: 0 | Status: SHIPPED | OUTPATIENT
Start: 2020-02-11 | End: 2020-02-16

## 2020-02-11 RX ORDER — ONDANSETRON 4 MG/1
4 TABLET, ORALLY DISINTEGRATING ORAL ONCE
Status: COMPLETED | OUTPATIENT
Start: 2020-02-11 | End: 2020-02-11

## 2020-02-11 RX ADMIN — ONDANSETRON 4 MG: 4 TABLET, ORALLY DISINTEGRATING ORAL at 01:50

## 2020-02-11 RX ADMIN — FENTANYL CITRATE 50 MCG: 0.05 INJECTION, SOLUTION INTRAMUSCULAR; INTRAVENOUS at 01:50

## 2020-02-11 NOTE — ED PROVIDER NOTES
ED Provider Note    CHIEF COMPLAINT  Chief Complaint   Patient presents with   • N/V     Since head trauma, oral zofran ineffective   • Head Injury     One week ago, head pain since event, diagnosis of concusion and hemotoma and injury site   • Dizziness   • Constipation     Decrease stool output since head trauma       HPI  Sadia Troncoso is a 24 y.o. female who presents with a headache.  Approximately a week ago the patient struck the right side of her head with a large chair.  She did not have a loss of consciousness and was evaluated in [place] and had a negative CT scan of her head.  Since that time she is had recurrent headaches with associated nausea and vomiting.  She is been taking Zofran which has not been effective.  She also has associated dizziness.  She states does not feel well.  She also presents with constipation states she is had no output of stool since the head injury.  She has not had any associated fevers.    REVIEW OF SYSTEMS  See HPI for further details. All other systems are negative.     PAST MEDICAL HISTORY  No past medical history on file.    FAM[facility] HISTORY  [facility]    SOCIAL HISTORY  Social History     Socioeconomic History   • Marital status: Single     Spouse name: Not on file   • Number of children: Not on file   • Years of education: Not on file   • Highest education level: Not on file   Occupational History   • Not on file   Social Needs   • Financial resource strain: Not on file   • Food insecurity:     Worry: Not on file     Inability: Not on file   • Transportation needs:     Medical: Not on file     Non-medical: Not on file   Tobacco Use   • Smoking status: Never Smoker   • Smokeless tobacco: Never Used   Substance and Sexual Activity   • Alcohol use: No   • Drug use: No   • Sexual activity: Not on file   Lifestyle   • Physical activity:     Days per week: Not on file     Minutes per session: Not on file   • Stress: Not on file   Relationships   • Social connections:      "Talks on phone: Not on file     Gets together: Not on file     Attends Nondenominational service: Not on file     Active member of club or organization: Not on file     Attends meetings of clubs or organizations: Not on file     Relationship status: Not on file   • Intimate partner violence:     Fear of current or ex partner: Not on file     Emotionally abused: Not on file     Physically abused: Not on file     Forced sexual activity: Not on file   Other Topics Concern   • Not on file   Social History Narrative   • Not on file       SURGICAL HISTORY  No past surgical history on file.    CURRENT MEDICATIONS  Home Medications     Reviewed by Sujit Gates R.N. (Registered Nurse) on 02/10/20 at 2311  Med List Status: Partial   Medication Last Dose Status   ferrous sulfate 325 (65 Fe) MG tablet  Active   ondansetron (ZOFRAN ODT) 4 MG TABLET DISPERSIBLE  Active   Prenatal MV-Min-Fe Fum-FA-DHA (PRENATAL 1 PO)  Active   sennosides-docusate sodium (SENOKOT-S) 8.6-50 MG tablet  Active                ALLERGIES  No Known Allergies    PHYSICAL EXAM  VITAL SIGNS: BP (!) 95/72   Pulse 73   Temp 36.8 °C (98.2 °F) (Oral)   Resp 19   Ht 1.549 m (5' 1\")   Wt 68.1 kg (150 lb 2.1 oz)   SpO2 99%   BMI 28.37 kg/m²  Room air O2: 100    Constitutional: Well developed, Well nourished, No acute distress, Non-toxic appearance.   HENT: Right temporal tenderness, Bilateral external ears normal, Oropharynx moist, No oral exudates, Nose normal.   Eyes: PERRLA, EOMI, Conjunctiva normal, No discharge.   Neck: Normal range of motion, No tenderness, Supple, No stridor.   Lymphatic: No lymphadenopathy noted.   Cardiovascular: Normal heart rate, Normal rhythm, No murmurs, No rubs, No gallops.   Thorax & Lungs: Normal breath sounds, No respiratory distress, No wheezing, No chest tenderness.   Abdomen: Bowel sounds normal, Soft, No tenderness, No masses, No pulsatile masses.   Skin: Warm, Dry, No erythema, No rash.   Back: No tenderness, No CVA " tenderness.   Extremities: Intact distal pulses, No edema, No tenderness, No cyanosis, No clubbing.   Neurologic: Alert & oriented x 3, Normal motor function, Normal sensory function, No focal deficits noted.   Psychiatric: Affect normal, Judgment normal, Mood normal.       RADIOLOGY/PROCEDURES  CT-HEAD W/O   Final Result         1.  No acute intracranial abnormality.            COURSE & MEDICAL DECISION MAKING  Pertinent Labs & Imaging studies reviewed. (See chart for details)  This a 24-year-old female who presents to the emergency department with a headache.  I suspect this is from a postconcussive syndrome.  I did repeat a CT scan of the head due to her persistent symptoms and this does not show any evidence of acute intracranial abnormalities.  As for her abdominal pain I suspect this is more from constipation.  Her abdomen is nondistended and she has good bowel sounds.  The patient will be discharged home on Phenergan as the Zofran has not been effective in controlling her nausea.  She is instructed take Tylenol Motrin as needed for pain control.  She is also instructed to follow-up with her primary care provider in 5 to 7 days for repeat examination.  The patient will return to the emergency department if she is acutely worse.    FINAL IMPRESSION  1.  Postconcussive syndrome  2.  Constipation    Disposition  The patient will be discharged in stable condition         Electronically signed by: Nader Mendoza M.D., 2/11/2020 1:25 AM

## 2020-02-11 NOTE — ED NOTES
Patient verbalized understanding of discharge instructions, provided with discharge paperwork, gait steady, ambulated independently to [facility].

## 2020-02-11 NOTE — ED TRIAGE NOTES
Chief Complaint   Patient presents with   • N/V     Since head trauma, oral zofran ineffective   • Head Injury     One week ago, head pain since event, diagnosis of concusion and hemotoma and injury site   • Dizziness

## 2020-02-16 ENCOUNTER — HOSPITAL ENCOUNTER (EMERGENCY)
Facility: MEDICAL CENTER | Age: 25
End: 2020-02-16
Attending: EMERGENCY MEDICINE

## 2020-02-16 VITALS
HEART RATE: 84 BPM | OXYGEN SATURATION: 98 % | WEIGHT: 150.13 LBS | DIASTOLIC BLOOD PRESSURE: 52 MMHG | RESPIRATION RATE: 16 BRPM | BODY MASS INDEX: 28.35 KG/M2 | SYSTOLIC BLOOD PRESSURE: 92 MMHG | TEMPERATURE: 97.1 F | HEIGHT: 61 IN

## 2020-02-16 DIAGNOSIS — R11.2 NON-INTRACTABLE VOMITING WITH NAUSEA, UNSPECIFIED VOMITING TYPE: ICD-10-CM

## 2020-02-16 DIAGNOSIS — E87.6 ACUTE HYPOKALEMIA: ICD-10-CM

## 2020-02-16 DIAGNOSIS — F10.920 ALCOHOLIC INTOXICATION WITHOUT COMPLICATION (HCC): ICD-10-CM

## 2020-02-16 DIAGNOSIS — E86.0 DEHYDRATION: ICD-10-CM

## 2020-02-16 LAB
ALBUMIN SERPL BCP-MCNC: 4.5 G/DL (ref 3.2–4.9)
ALBUMIN/GLOB SERPL: 1.4 G/DL
ALP SERPL-CCNC: 57 U/L (ref 30–99)
ALT SERPL-CCNC: 14 U/L (ref 2–50)
AMPHET UR QL SCN: NEGATIVE
ANION GAP SERPL CALC-SCNC: 19 MMOL/L (ref 7–16)
AST SERPL-CCNC: 22 U/L (ref 12–45)
BARBITURATES UR QL SCN: NEGATIVE
BASOPHILS # BLD AUTO: 0.3 % (ref 0–1.8)
BASOPHILS # BLD: 0.02 K/UL (ref 0–0.12)
BENZODIAZ UR QL SCN: NEGATIVE
BILIRUB SERPL-MCNC: <0.2 MG/DL (ref 0.1–1.5)
BUN SERPL-MCNC: 6 MG/DL (ref 8–22)
BZE UR QL SCN: NEGATIVE
CALCIUM SERPL-MCNC: 9.1 MG/DL (ref 8.4–10.2)
CANNABINOIDS UR QL SCN: NEGATIVE
CHLORIDE SERPL-SCNC: 102 MMOL/L (ref 96–112)
CO2 SERPL-SCNC: 18 MMOL/L (ref 20–33)
CREAT SERPL-MCNC: 0.62 MG/DL (ref 0.5–1.4)
EKG IMPRESSION: NORMAL
EOSINOPHIL # BLD AUTO: 0.02 K/UL (ref 0–0.51)
EOSINOPHIL NFR BLD: 0.3 % (ref 0–6.9)
ERYTHROCYTE [DISTWIDTH] IN BLOOD BY AUTOMATED COUNT: 45.1 FL (ref 35.9–50)
ETHANOL BLD-MCNC: 173 MG/DL (ref 0–10)
GLOBULIN SER CALC-MCNC: 3.2 G/DL (ref 1.9–3.5)
GLUCOSE SERPL-MCNC: 107 MG/DL (ref 65–99)
HCG SERPL QL: NEGATIVE
HCT VFR BLD AUTO: 36 % (ref 37–47)
HGB BLD-MCNC: 11.4 G/DL (ref 12–16)
IMM GRANULOCYTES # BLD AUTO: 0.01 K/UL (ref 0–0.11)
IMM GRANULOCYTES NFR BLD AUTO: 0.2 % (ref 0–0.9)
LIPASE SERPL-CCNC: 41 U/L (ref 7–58)
LYMPHOCYTES # BLD AUTO: 2.23 K/UL (ref 1–4.8)
LYMPHOCYTES NFR BLD: 35 % (ref 22–41)
MCH RBC QN AUTO: 26 PG (ref 27–33)
MCHC RBC AUTO-ENTMCNC: 31.7 G/DL (ref 33.6–35)
MCV RBC AUTO: 82.2 FL (ref 81.4–97.8)
METHADONE UR QL SCN: NEGATIVE
MONOCYTES # BLD AUTO: 0.39 K/UL (ref 0–0.85)
MONOCYTES NFR BLD AUTO: 6.1 % (ref 0–13.4)
NEUTROPHILS # BLD AUTO: 3.71 K/UL (ref 2–7.15)
NEUTROPHILS NFR BLD: 58.1 % (ref 44–72)
NRBC # BLD AUTO: 0 K/UL
NRBC BLD-RTO: 0 /100 WBC
OPIATES UR QL SCN: NEGATIVE
OXYCODONE UR QL SCN: NEGATIVE
PCP UR QL SCN: NEGATIVE
PLATELET # BLD AUTO: 311 K/UL (ref 164–446)
PMV BLD AUTO: 11.1 FL (ref 9–12.9)
POTASSIUM SERPL-SCNC: 3 MMOL/L (ref 3.6–5.5)
PROPOXYPH UR QL SCN: NEGATIVE
PROT SERPL-MCNC: 7.7 G/DL (ref 6–8.2)
RBC # BLD AUTO: 4.38 M/UL (ref 4.2–5.4)
SODIUM SERPL-SCNC: 139 MMOL/L (ref 135–145)
WBC # BLD AUTO: 6.4 K/UL (ref 4.8–10.8)

## 2020-02-16 PROCEDURE — 80053 COMPREHEN METABOLIC PANEL: CPT

## 2020-02-16 PROCEDURE — 85025 COMPLETE CBC W/AUTO DIFF WBC: CPT

## 2020-02-16 PROCEDURE — 700105 HCHG RX REV CODE 258: Performed by: EMERGENCY MEDICINE

## 2020-02-16 PROCEDURE — 96375 TX/PRO/DX INJ NEW DRUG ADDON: CPT

## 2020-02-16 PROCEDURE — 80307 DRUG TEST PRSMV CHEM ANLYZR: CPT

## 2020-02-16 PROCEDURE — 93005 ELECTROCARDIOGRAM TRACING: CPT | Performed by: EMERGENCY MEDICINE

## 2020-02-16 PROCEDURE — 96374 THER/PROPH/DIAG INJ IV PUSH: CPT

## 2020-02-16 PROCEDURE — 84703 CHORIONIC GONADOTROPIN ASSAY: CPT

## 2020-02-16 PROCEDURE — 36415 COLL VENOUS BLD VENIPUNCTURE: CPT

## 2020-02-16 PROCEDURE — 83690 ASSAY OF LIPASE: CPT

## 2020-02-16 PROCEDURE — 93005 ELECTROCARDIOGRAM TRACING: CPT

## 2020-02-16 PROCEDURE — A9270 NON-COVERED ITEM OR SERVICE: HCPCS | Performed by: EMERGENCY MEDICINE

## 2020-02-16 PROCEDURE — 99285 EMERGENCY DEPT VISIT HI MDM: CPT

## 2020-02-16 PROCEDURE — 700102 HCHG RX REV CODE 250 W/ 637 OVERRIDE(OP): Performed by: EMERGENCY MEDICINE

## 2020-02-16 PROCEDURE — 700111 HCHG RX REV CODE 636 W/ 250 OVERRIDE (IP): Performed by: EMERGENCY MEDICINE

## 2020-02-16 RX ORDER — SODIUM CHLORIDE 9 MG/ML
1000 INJECTION, SOLUTION INTRAVENOUS ONCE
Status: COMPLETED | OUTPATIENT
Start: 2020-02-16 | End: 2020-02-16

## 2020-02-16 RX ORDER — METOCLOPRAMIDE HYDROCHLORIDE 5 MG/ML
10 INJECTION INTRAMUSCULAR; INTRAVENOUS ONCE
Status: COMPLETED | OUTPATIENT
Start: 2020-02-16 | End: 2020-02-16

## 2020-02-16 RX ORDER — POTASSIUM CHLORIDE 20 MEQ/1
40 TABLET, EXTENDED RELEASE ORAL ONCE
Status: COMPLETED | OUTPATIENT
Start: 2020-02-16 | End: 2020-02-16

## 2020-02-16 RX ORDER — DIPHENHYDRAMINE HYDROCHLORIDE 50 MG/ML
25 INJECTION INTRAMUSCULAR; INTRAVENOUS ONCE
Status: COMPLETED | OUTPATIENT
Start: 2020-02-16 | End: 2020-02-16

## 2020-02-16 RX ORDER — ONDANSETRON 4 MG/1
4 TABLET, ORALLY DISINTEGRATING ORAL EVERY 6 HOURS PRN
Qty: 10 TAB | Refills: 0 | Status: SHIPPED | OUTPATIENT
Start: 2020-02-16 | End: 2020-03-03

## 2020-02-16 RX ORDER — PROMETHAZINE HYDROCHLORIDE 25 MG/1
25 SUPPOSITORY RECTAL EVERY 6 HOURS PRN
Qty: 10 SUPPOSITORY | Refills: 0 | Status: SHIPPED | OUTPATIENT
Start: 2020-02-16 | End: 2020-03-03

## 2020-02-16 RX ORDER — ONDANSETRON 2 MG/ML
4 INJECTION INTRAMUSCULAR; INTRAVENOUS ONCE
Status: COMPLETED | OUTPATIENT
Start: 2020-02-16 | End: 2020-02-16

## 2020-02-16 RX ADMIN — ONDANSETRON 4 MG: 2 INJECTION INTRAMUSCULAR; INTRAVENOUS at 01:09

## 2020-02-16 RX ADMIN — POTASSIUM CHLORIDE 40 MEQ: 1500 TABLET, EXTENDED RELEASE ORAL at 02:24

## 2020-02-16 RX ADMIN — SODIUM CHLORIDE 1000 ML: 9 INJECTION, SOLUTION INTRAVENOUS at 01:09

## 2020-02-16 RX ADMIN — FAMOTIDINE 20 MG: 10 INJECTION INTRAVENOUS at 01:08

## 2020-02-16 RX ADMIN — METOCLOPRAMIDE 10 MG: 5 INJECTION, SOLUTION INTRAMUSCULAR; INTRAVENOUS at 02:57

## 2020-02-16 RX ADMIN — DIPHENHYDRAMINE HYDROCHLORIDE 25 MG: 50 INJECTION INTRAMUSCULAR; INTRAVENOUS at 02:57

## 2020-02-16 ASSESSMENT — LIFESTYLE VARIABLES
EVER FELT BAD OR GUILTY ABOUT YOUR DRINKING: NO
CONSUMPTION TOTAL: INCOMPLETE
TOTAL SCORE: 0
TOTAL SCORE: 0
DO YOU DRINK ALCOHOL: YES
HAVE YOU EVER FELT YOU SHOULD CUT DOWN ON YOUR DRINKING: NO
EVER HAD A DRINK FIRST THING IN THE MORNING TO STEADY YOUR NERVES TO GET RID OF A HANGOVER: NO
HAVE PEOPLE ANNOYED YOU BY CRITICIZING YOUR DRINKING: NO
TOTAL SCORE: 0

## 2020-02-16 NOTE — ED PROVIDER NOTES
ED Provider Note    ED Provider Note    Scribed for Saravanan Santiago MD by Saravanan Santiago M.D.. 2/16/2020, 12:56 AM.    Primary care provider: Kishan Alanis M.D.  Means of arrival: Private  History obtained from: Patient and friends  History limited by: None    CHIEF COMPLAINT  Chief Complaint   Patient presents with   • N/V   • Alcohol Intoxication       HPI  Sadia Troncoso is a 24 y.o. female who presents to the Emergency Department for evaluation of nausea, vomiting, and syncope.  Patient does admit she has been drinking heavily today with her friends.  She is not a chronic alcoholic, denies any recreational drug use beyond the alcohol.  She will starting about an hour prior to arrival she began feeling quite lightheaded.  She describes it sounds like multiple syncopal episodes, patient had a syncopal episode upon check-in as well.  No history of convulsive or seizure-like activity.  Thankfully no significant head trauma, patient is with a friend who is clinically sober.  Nausea and single episode of emesis prior to syncopal episode an hour ago.  She has had 3 episodes of emesis while en route.  Denies any focal weakness or numbness but does note generalized fatigue.  No significant abdominal pain, no acute headache the patient does relate diagnosis of recent concussion approximately 5 days ago.  She is not anticoagulated, no history of diabetes mellitus.    REVIEW OF SYSTEMS  Pertinent positives include nausea vomiting, syncope, alcohol use. Pertinent negatives include no significant head trauma.  All other systems reviewed and negative.    PAST MEDICAL HISTORY   has a past medical history of Anxiety and Seizures (HCC).    SURGICAL HISTORY  patient denies any surgical history    SOCIAL HISTORY  Social History     Tobacco Use   • Smoking status: Current Some Day Smoker   • Smokeless tobacco: Never Used   Substance Use Topics   • Alcohol use: No   • Drug use: No      Social History     Substance  "and Sexual Activity   Drug Use No       FAMILY HISTORY  No family history on file.    CURRENT MEDICATIONS  Home Medications    **Home medications have not yet been reviewed for this encounter**         ALLERGIES  No Known Allergies    PHYSICAL EXAM  VITAL SIGNS: BP (!) 92/52   Pulse 84   Temp 36.2 °C (97.1 °F) (Temporal)   Resp 16   Ht 1.549 m (5' 1\")   Wt 68.1 kg (150 lb 2.1 oz)   LMP 01/19/2020   SpO2 98%   BMI 28.37 kg/m²     General: Alert, mild acute distress  Skin: Warm, dry, pale.  Head: Normocephalic, atraumatic  Neck: Trachea midline, no tenderness  Eye: PERRL, normal conjunctiva, sclera are anicteric  ENMT: Oral mucosa pink and dry, no pharyngeal erythema or exudate  Cardiovascular: Regular rate and rhythm, No murmur, Normal peripheral perfusion  Respiratory: Lungs CTA, respirations are non-labored, breath sounds are equal  Gastrointestinal: Soft, bowel sounds are hypoactive, nontender, nondistended, no guarding, rebound, no rigidity.  Musculoskeletal: No swelling, no deformity  Neurological: Alert and oriented to person, place, time, and situation.  Cranial nerves II through XII are grossly intact, extraocular movements are intact without nystagmus.  No pronator drift, 5 x 5 upper and lower extremity strength and sensation symmetrical bilaterally.  Lymphatics: No lymphadenopathy  Psychiatric: Cooperative, mildly anxious, otherwise appropriate mood & affect      DIAGNOSTIC STUDIES/PROCEDURES    LABS  Results for orders placed or performed during the hospital encounter of 02/16/20   URINE DRUG SCREEN (TRIAGE)   Result Value Ref Range    Amphetamines Urine Negative Negative    Barbiturates Negative Negative    Benzodiazepines Negative Negative    Cocaine Metabolite Negative Negative    Methadone Negative Negative    Opiates Negative Negative    Oxycodone Negative Negative    Phencyclidine -Pcp Negative Negative    Propoxyphene Negative Negative    Cannabinoid Metab Negative Negative   Blood Alcohol "   Result Value Ref Range    Diagnostic Alcohol 173 (H) 0 - 10 mg/dL   CBC WITH DIFFERENTIAL   Result Value Ref Range    WBC 6.4 4.8 - 10.8 K/uL    RBC 4.38 4.20 - 5.40 M/uL    Hemoglobin 11.4 (L) 12.0 - 16.0 g/dL    Hematocrit 36.0 (L) 37.0 - 47.0 %    MCV 82.2 81.4 - 97.8 fL    MCH 26.0 (L) 27.0 - 33.0 pg    MCHC 31.7 (L) 33.6 - 35.0 g/dL    RDW 45.1 35.9 - 50.0 fL    Platelet Count 311 164 - 446 K/uL    MPV 11.1 9.0 - 12.9 fL    Neutrophils-Polys 58.10 44.00 - 72.00 %    Lymphocytes 35.00 22.00 - 41.00 %    Monocytes 6.10 0.00 - 13.40 %    Eosinophils 0.30 0.00 - 6.90 %    Basophils 0.30 0.00 - 1.80 %    Immature Granulocytes 0.20 0.00 - 0.90 %    Nucleated RBC 0.00 /100 WBC    Neutrophils (Absolute) 3.71 2.00 - 7.15 K/uL    Lymphs (Absolute) 2.23 1.00 - 4.80 K/uL    Monos (Absolute) 0.39 0.00 - 0.85 K/uL    Eos (Absolute) 0.02 0.00 - 0.51 K/uL    Baso (Absolute) 0.02 0.00 - 0.12 K/uL    Immature Granulocytes (abs) 0.01 0.00 - 0.11 K/uL    NRBC (Absolute) 0.00 K/uL   COMP METABOLIC PANEL   Result Value Ref Range    Sodium 139 135 - 145 mmol/L    Potassium 3.0 (L) 3.6 - 5.5 mmol/L    Chloride 102 96 - 112 mmol/L    Co2 18 (L) 20 - 33 mmol/L    Anion Gap 19.0 (H) 7.0 - 16.0    Glucose 107 (H) 65 - 99 mg/dL    Bun 6 (L) 8 - 22 mg/dL    Creatinine 0.62 0.50 - 1.40 mg/dL    Calcium 9.1 8.4 - 10.2 mg/dL    AST(SGOT) 22 12 - 45 U/L    ALT(SGPT) 14 2 - 50 U/L    Alkaline Phosphatase 57 30 - 99 U/L    Total Bilirubin <0.2 0.1 - 1.5 mg/dL    Albumin 4.5 3.2 - 4.9 g/dL    Total Protein 7.7 6.0 - 8.2 g/dL    Globulin 3.2 1.9 - 3.5 g/dL    A-G Ratio 1.4 g/dL   LIPASE   Result Value Ref Range    Lipase 41 7 - 58 U/L   HCG QUAL SERUM   Result Value Ref Range    Beta-Hcg Qualitative Serum Negative Negative   ESTIMATED GFR   Result Value Ref Range    GFR If African American >60 >60 mL/min/1.73 m 2    GFR If Non African American >60 >60 mL/min/1.73 m 2   EKG   Result Value Ref Range    Report       [facility]  [place] [facility].    Test Date:  2020  Pt Name:    JONATHAN DICKERSON                 Department: EDS  MRN:        [id number]                      Room:       -ROOM 2  Gender:     Female                       Technician: [id number]  :        1995                   Requested By:ER TRIAGE PROTOCOL  Order #:    [id number]                    Reading MD: SUZIE MARTINEZ MD    Measurements  Intervals                                Axis  Rate:       95                           P:  IA:                                      QRS:        60  QRSD:       89                           T:          45  QT:         364  QTc:        458    Interpretive Statements  Atrial flutter  No previous ECG available for comparison  Electronically Signed On 2020 0:58:55 PST by SUZIE MARTINEZ MD       All labs reviewed by me, hypokalemia, decreased CO2 and elevated anion gap consistent with volume depletion as well as mild alcoholic ketosis, otherwise unremarkable.    EKG  12 Lead EKG obtained at 0044 and interpreted by me to show:  Rhythm: Normal sinus rhythm   Rate: 95  Axis: Normal  Intervals: Normal  Q Waves: Normal  No diagnostic ST segment elevation    Clinical Impression: Normal EKG  Compared to none available      COURSE & MEDICAL DECISION MAKING  Pertinent Labs & Imaging studies reviewed. (See chart for details)    12:56 AM - Patient seen and examined at bedside. Patient will be treated with 1 L of crystalloid, Zofran 4 mg IV, famotidine 20 mg IV,. Ordered toxicologic work-up including LFTs and lipase to evaluate her symptoms. The differential diagnoses include but are not limited to: Alcoholic gastritis, alcoholic ketosis, pancreatitis, drug ingestion    0228: Patient reassessed, still somewhat nauseous, struggling with p.o. intake.  Have ordered Reglan and Benadryl for.  We are awaiting urine tox at this time.    0308: Patient reassessed, feeling well, tolerating p.o. after Reglan.  Relieved to hear of  "unremarkable studies, ambulates with a steady gait in the ED, no dysrhythmia noted.    Patient Vitals for the past 24 hrs:   BP Temp Temp src Pulse Resp SpO2 Height Weight   02/16/20 0311 (!) 92/52 -- -- 84 16 98 % -- --   02/16/20 0022 119/81 36.2 °C (97.1 °F) Temporal (!) 109 (!) 22 92 % -- --   02/16/20 0017 -- -- -- -- -- -- 1.549 m (5' 1\") 68.1 kg (150 lb 2.1 oz)     HYDRATION: Based on the patient's presentation of Acute Vomiting, Dehydration and Tachycardia the patient was given IV fluids. IV Hydration was used because oral hydration was not as rapid as required. Upon recheck following hydration, the patient was Feeling better, skin tone improved and tachycardia resolved, heart rate currently 84.    Decision Making:  This is a 24 y.o. year old female who presents for evaluation of nausea vomiting and multiple syncopal episodes.  Patient appears to be clinically dehydrated and has been drinking copious amounts of alcohol.  This certainly could be the etiology of her symptoms, however the patient is concerned that she may have been \"dosed\" at the bar.  She denies any recreational drug use, per her request I think is reasonable obtain a tox screen.  Otherwise patient is been vomiting and unable to tolerate oral intake so clearly IV fluids are indicated.  Other than evidence of volume depletion and mild alcoholic ketosis work-up is benign.  She is able to tolerate p.o.  Patient is fluid resuscitated, walks with a steady gait here in the ED, suspect likely her syncope is secondary to volume depletion and her alcohol intoxication.  No evidence of gutierrez hepatic dysfunction, no indication for inpatient management.    The patient will return for new or worsening symptoms and is stable at the time of discharge.      DISPOSITION:  Patient will be discharged home in stable condition.    FOLLOW UP:  Kishan Alanis M.D.  [address] [place] [id number]  [phone]    Schedule an appointment as soon as possible " for a visit in 2 days        OUTPATIENT MEDICATIONS:  Discharge Medication List as of 2/16/2020  3:13 AM      START taking these medications    Details   promethazine (PROMETHEGAN) 25 MG Suppos Insert 1 Suppository in rectum every 6 hours as needed for Nausea/Vomiting., Disp-10 Suppository, R-0, Print Rx Paper               FINAL IMPRESSION  1. Dehydration    2. Alcoholic intoxication without complication (HCC)    3. Non-intractable vomiting with nausea, unspecified vomiting type    4. Acute hypokalemia          Saravanan GONCALVES M.D. (Scribe), am scribing for, and in the presence of, Saravanan Santiago MD.    Electronically signed by: Saravanan Santiago M.D. (Scribe), 2/16/2020    Saravanan GONCALVES MD personally performed the services described in this documentation, as scribed by Saravanan Santiago M.D. in my presence, and it is both accurate and complete    The note accurately reflects work and decisions made by me.  Saravanan Santiago M.D.  2/16/2020  6:00 AM

## 2020-02-16 NOTE — ED TRIAGE NOTES
Patient BiB friends for n/v, etoh intox, and multiple syncopal episodes. Patient was seen in ED 2 weeks ago and diagnosed with concussion. Patient had syncopal episode in triage and withdrew from pain but was otherwise unresponsive.

## 2020-03-03 ENCOUNTER — HOSPITAL ENCOUNTER (EMERGENCY)
Facility: MEDICAL CENTER | Age: 25
End: 2020-03-03
Attending: EMERGENCY MEDICINE

## 2020-03-03 ENCOUNTER — APPOINTMENT (OUTPATIENT)
Dept: RADIOLOGY | Facility: MEDICAL CENTER | Age: 25
End: 2020-03-03
Attending: EMERGENCY MEDICINE

## 2020-03-03 VITALS
HEART RATE: 90 BPM | HEIGHT: 61 IN | RESPIRATION RATE: 17 BRPM | BODY MASS INDEX: 27.97 KG/M2 | DIASTOLIC BLOOD PRESSURE: 52 MMHG | WEIGHT: 148.15 LBS | SYSTOLIC BLOOD PRESSURE: 98 MMHG | TEMPERATURE: 97.9 F | OXYGEN SATURATION: 95 %

## 2020-03-03 DIAGNOSIS — G44.89 HEADACHE SYNDROME: ICD-10-CM

## 2020-03-03 LAB
ALBUMIN SERPL BCP-MCNC: 4.6 G/DL (ref 3.2–4.9)
ALBUMIN/GLOB SERPL: 1.4 G/DL
ALP SERPL-CCNC: 56 U/L (ref 30–99)
ALT SERPL-CCNC: 16 U/L (ref 2–50)
ANION GAP SERPL CALC-SCNC: 7 MMOL/L (ref 0–11.9)
APPEARANCE UR: CLEAR
AST SERPL-CCNC: 20 U/L (ref 12–45)
BACTERIA #/AREA URNS HPF: ABNORMAL /HPF
BASOPHILS # BLD AUTO: 0.3 % (ref 0–1.8)
BASOPHILS # BLD: 0.02 K/UL (ref 0–0.12)
BILIRUB SERPL-MCNC: 0.3 MG/DL (ref 0.1–1.5)
BILIRUB UR QL STRIP.AUTO: NEGATIVE
BUN SERPL-MCNC: 10 MG/DL (ref 8–22)
CALCIUM SERPL-MCNC: 9.5 MG/DL (ref 8.5–10.5)
CHLORIDE SERPL-SCNC: 105 MMOL/L (ref 96–112)
CO2 SERPL-SCNC: 25 MMOL/L (ref 20–33)
COLOR UR: YELLOW
CREAT SERPL-MCNC: 0.69 MG/DL (ref 0.5–1.4)
EOSINOPHIL # BLD AUTO: 0.03 K/UL (ref 0–0.51)
EOSINOPHIL NFR BLD: 0.5 % (ref 0–6.9)
EPI CELLS #/AREA URNS HPF: ABNORMAL /HPF
ERYTHROCYTE [DISTWIDTH] IN BLOOD BY AUTOMATED COUNT: 46.4 FL (ref 35.9–50)
ETHANOL BLD-MCNC: 0 G/DL
GLOBULIN SER CALC-MCNC: 3.4 G/DL (ref 1.9–3.5)
GLUCOSE SERPL-MCNC: 88 MG/DL (ref 65–99)
GLUCOSE UR STRIP.AUTO-MCNC: NEGATIVE MG/DL
HCG SERPL QL: NEGATIVE
HCT VFR BLD AUTO: 37.4 % (ref 37–47)
HGB BLD-MCNC: 11.8 G/DL (ref 12–16)
HYALINE CASTS #/AREA URNS LPF: ABNORMAL /LPF
IMM GRANULOCYTES # BLD AUTO: 0.01 K/UL (ref 0–0.11)
IMM GRANULOCYTES NFR BLD AUTO: 0.2 % (ref 0–0.9)
KETONES UR STRIP.AUTO-MCNC: NEGATIVE MG/DL
LEUKOCYTE ESTERASE UR QL STRIP.AUTO: ABNORMAL
LIPASE SERPL-CCNC: 33 U/L (ref 11–82)
LYMPHOCYTES # BLD AUTO: 2.72 K/UL (ref 1–4.8)
LYMPHOCYTES NFR BLD: 43.7 % (ref 22–41)
MCH RBC QN AUTO: 26.3 PG (ref 27–33)
MCHC RBC AUTO-ENTMCNC: 31.6 G/DL (ref 33.6–35)
MCV RBC AUTO: 83.5 FL (ref 81.4–97.8)
MICRO URNS: ABNORMAL
MONOCYTES # BLD AUTO: 0.31 K/UL (ref 0–0.85)
MONOCYTES NFR BLD AUTO: 5 % (ref 0–13.4)
NEUTROPHILS # BLD AUTO: 3.13 K/UL (ref 2–7.15)
NEUTROPHILS NFR BLD: 50.3 % (ref 44–72)
NITRITE UR QL STRIP.AUTO: NEGATIVE
NRBC # BLD AUTO: 0 K/UL
NRBC BLD-RTO: 0 /100 WBC
PH UR STRIP.AUTO: 5.5 [PH] (ref 5–8)
PLATELET # BLD AUTO: 343 K/UL (ref 164–446)
PMV BLD AUTO: 10.7 FL (ref 9–12.9)
POTASSIUM SERPL-SCNC: 4 MMOL/L (ref 3.6–5.5)
PROT SERPL-MCNC: 8 G/DL (ref 6–8.2)
PROT UR QL STRIP: NEGATIVE MG/DL
RBC # BLD AUTO: 4.48 M/UL (ref 4.2–5.4)
RBC # URNS HPF: ABNORMAL /HPF
RBC UR QL AUTO: ABNORMAL
SODIUM SERPL-SCNC: 137 MMOL/L (ref 135–145)
SP GR UR STRIP.AUTO: 1.02
TSH SERPL DL<=0.005 MIU/L-ACNC: 1.07 UIU/ML (ref 0.38–5.33)
UROBILINOGEN UR STRIP.AUTO-MCNC: 0.2 MG/DL
WBC # BLD AUTO: 6.2 K/UL (ref 4.8–10.8)
WBC #/AREA URNS HPF: ABNORMAL /HPF

## 2020-03-03 PROCEDURE — 36415 COLL VENOUS BLD VENIPUNCTURE: CPT

## 2020-03-03 PROCEDURE — 85025 COMPLETE CBC W/AUTO DIFF WBC: CPT

## 2020-03-03 PROCEDURE — 700111 HCHG RX REV CODE 636 W/ 250 OVERRIDE (IP): Performed by: EMERGENCY MEDICINE

## 2020-03-03 PROCEDURE — 80307 DRUG TEST PRSMV CHEM ANLYZR: CPT

## 2020-03-03 PROCEDURE — 700117 HCHG RX CONTRAST REV CODE 255: Performed by: EMERGENCY MEDICINE

## 2020-03-03 PROCEDURE — A9270 NON-COVERED ITEM OR SERVICE: HCPCS | Performed by: EMERGENCY MEDICINE

## 2020-03-03 PROCEDURE — 84703 CHORIONIC GONADOTROPIN ASSAY: CPT

## 2020-03-03 PROCEDURE — 700102 HCHG RX REV CODE 250 W/ 637 OVERRIDE(OP): Performed by: EMERGENCY MEDICINE

## 2020-03-03 PROCEDURE — 96375 TX/PRO/DX INJ NEW DRUG ADDON: CPT

## 2020-03-03 PROCEDURE — 80053 COMPREHEN METABOLIC PANEL: CPT

## 2020-03-03 PROCEDURE — A9576 INJ PROHANCE MULTIPACK: HCPCS | Performed by: EMERGENCY MEDICINE

## 2020-03-03 PROCEDURE — 84443 ASSAY THYROID STIM HORMONE: CPT

## 2020-03-03 PROCEDURE — 83690 ASSAY OF LIPASE: CPT

## 2020-03-03 PROCEDURE — 70553 MRI BRAIN STEM W/O & W/DYE: CPT

## 2020-03-03 PROCEDURE — 96374 THER/PROPH/DIAG INJ IV PUSH: CPT

## 2020-03-03 PROCEDURE — 99284 EMERGENCY DEPT VISIT MOD MDM: CPT

## 2020-03-03 PROCEDURE — 81001 URINALYSIS AUTO W/SCOPE: CPT

## 2020-03-03 PROCEDURE — 72156 MRI NECK SPINE W/O & W/DYE: CPT

## 2020-03-03 RX ORDER — LORAZEPAM 2 MG/ML
1 INJECTION INTRAMUSCULAR ONCE
Status: COMPLETED | OUTPATIENT
Start: 2020-03-03 | End: 2020-03-03

## 2020-03-03 RX ORDER — ACETAMINOPHEN 325 MG/1
650 TABLET ORAL ONCE
Status: COMPLETED | OUTPATIENT
Start: 2020-03-03 | End: 2020-03-03

## 2020-03-03 RX ORDER — CLONAZEPAM 2 MG/1
2 TABLET ORAL NIGHTLY PRN
Status: SHIPPED | COMMUNITY
End: 2021-11-01

## 2020-03-03 RX ADMIN — LORAZEPAM 1 MG: 2 INJECTION INTRAMUSCULAR; INTRAVENOUS at 17:36

## 2020-03-03 RX ADMIN — FAMOTIDINE 20 MG: 10 INJECTION INTRAVENOUS at 15:08

## 2020-03-03 RX ADMIN — ACETAMINOPHEN 650 MG: 325 TABLET, FILM COATED ORAL at 16:48

## 2020-03-03 RX ADMIN — GADOTERIDOL 15 ML: 279.3 INJECTION, SOLUTION INTRAVENOUS at 18:40

## 2020-03-03 SDOH — HEALTH STABILITY: MENTAL HEALTH: HOW OFTEN DO YOU HAVE 6 OR MORE DRINKS ON ONE OCCASION?: LESS THAN MONTHLY

## 2020-03-03 SDOH — HEALTH STABILITY: MENTAL HEALTH: HOW OFTEN DO YOU HAVE A DRINK CONTAINING ALCOHOL?: MONTHLY OR LESS

## 2020-03-03 ASSESSMENT — LIFESTYLE VARIABLES: DO YOU DRINK ALCOHOL: NO

## 2020-03-03 ASSESSMENT — FIBROSIS 4 INDEX: FIB4 SCORE: 0.49

## 2020-03-03 NOTE — ED TRIAGE NOTES
".Sadia Troncoso  .  Chief Complaint   Patient presents with   • Abdominal Pain     x 1 week, \"it feels like my stomach is on fire\"   • Low Back Pain     x 1 week     Patient ambulatory to triage with above complaint. Patient also report \"feeling confused\" x 6 days, states \"I get my kid's name wrong and feel like I'm in a daze\". aao x 4. Neuro intact. ./78   Pulse 74   Temp 36.7 °C (98.1 °F) (Temporal)   Resp 16   Ht 1.549 m (5' 1\")   Wt 67.2 kg (148 lb 2.4 oz)   LMP 02/26/2020   SpO2 98%   BMI 27.99 kg/m²      Patient given urine specimen supplies.   Patient to lobby and instructed to inform staff of any needs.   "

## 2020-03-04 NOTE — ED NOTES
"At time of tport to mri pt called RN to room. Pt states \"I dont feel anything from the Ativan, can I have more.\" Pt educated of plan of care per erp no more than 1mg to be provided at this time. Pt verbalizes she will attempt to tolerate imaging at this time.   "

## 2020-03-04 NOTE — ED PROVIDER NOTES
"ED Provider Note    CHIEF COMPLAINT  Chief Complaint   Patient presents with   • Abdominal Pain     x 1 week, \"it feels like my stomach is on fire\"   • Low Back Pain     x 1 week       GODWIN Troncoso is a 24 y.o. female who presents to the emergency department with a multitude of complaints.  The patient says she has been having episodes of her entire body shaking uncontrollably.  She is not unconscious during these and her significant other says that she is awake and can talk she cannot stop shaking.  She is also been having frequent migraine headaches and she has been having a burning epigastric abdominal pain.  All of the symptoms have been going on for quite a long time now she is seen her primary care doctor a couple of times she actually has an appointment tomorrow the patient states that her primary care doctor has pointed out to her that her symptoms are long-term and it is not clear what is causing them.  The patient says that 6 years ago she had an MRI of her brain and she said that there was one abnormal spot on the scan and she was told that she might be developing MS and that is her primary concern tonight.  In addition 3 to 5 weeks ago she hit herself in the head with a chair that she was trying to carry and did not lose consciousness but had subsequent nausea and vomiting and but thinks that she suffered a concussion.  She states that her body is always achy she has facial tingling and has been constipated since a pregnancy 1 year ago.  She says that her skin is hypersensitive to pain and she has a brain fog.  She is now had 2 head CTs following that trauma and she was told these were normal.    REVIEW OF SYSTEMS no fever she is able to tolerate oral intake.  All other systems negative    PAST MEDICAL HISTORY  Past Medical History:   Diagnosis Date   • Anxiety    • Seizures (HCC)        FAMILY HISTORY  History reviewed. No pertinent family history.    SOCIAL HISTORY  Social History " "    Socioeconomic History   • Marital status: Single     Spouse name: Not on file   • Number of children: Not on file   • Years of education: Not on file   • Highest education level: Not on file   Occupational History   • Not on file   Social Needs   • Financial resource strain: Not on file   • Food insecurity     Worry: Not on file     Inability: Not on file   • Transportation needs     Medical: Not on file     Non-medical: Not on file   Tobacco Use   • Smoking status: Current Some Day Smoker   • Smokeless tobacco: Never Used   Substance and Sexual Activity   • Alcohol use: Yes     Frequency: Monthly or less     Binge frequency: Less than monthly   • Drug use: No   • Sexual activity: Not on file   Lifestyle   • Physical activity     Days per week: Not on file     Minutes per session: Not on file   • Stress: Not on file   Relationships   • Social connections     Talks on phone: Not on file     Gets together: Not on file     Attends L[employer]-day service: Not on file     Active member of club or organization: Not on file     Attends meetings of clubs or organizations: Not on file     Relationship status: Not on file   • Intimate partner violence     Fear of current or ex partner: Not on file     Emotionally abused: Not on file     Physically abused: Not on file     Forced sexual activity: Not on file   Other Topics Concern   • Not on file   Social History Narrative   • Not on file       SURGICAL HISTORY  History reviewed. No pertinent surgical history.    CURRENT MEDICATIONS  Home Medications     Reviewed by Joanie Case (Pharmacy Tech) on 03/03/20 at 1543  Med List Status: Complete   Medication Last Dose Status   clonazepam (KLONOPIN) 2 MG tablet 6 DAYS Active                ALLERGIES  No Known Allergies    PHYSICAL EXAM  VITAL SIGNS: /61   Pulse 90   Temp 36.7 °C (98.1 °F) (Temporal)   Resp 16   Ht 1.549 m (5' 1\")   Wt 67.2 kg (148 lb 2.4 oz)   LMP 02/26/2020   SpO2 92%   BMI 27.99 kg/m²  "   Oxygen saturation is interpreted as adequate  Constitutional: Awake verbal well-appearing individual talkative and in no distress  HENT: No signs of trauma to the head, mucous membranes are moist and throat clear  Eyes: Pupils are round and reactive extraocular motion is present and normal  Neck: Trachea midline no JVD no meningeal findings  Cardiovascular: Regular rate and rhythm  Lungs: Clear and equal bilaterally with no apparent difficulty breathing  Abdomen/Back: Soft nondistended nontender no rebound guarding or peritoneal findings and bowel sounds are normally active.  Skin: Warm and dry I do not see any rashes petechiae or purpura  Musculoskeletal: No acute bony deformity no leg edema or calf tenderness  Neurologic: The patient is awake and verbal she speaks in full complete clear sentences with a clear voice and no difficulty she appears lucid.  Her face is moving normally she is moving her upper and lower extremities normally    CHART REVIEW  The patient's last ER visit was last month on February 16, 2020 she was seen for nausea vomiting and alcohol intoxication.  The patient also had CT scan of the brain on February 11, 2020 which was unremarkable according to the radiology report    Laboratory  CBC shows white blood cell count of 6.2 hemoglobin is adequate at 11.8 complete metabolic panel and lipase are unremarkable urinalysis shows small leukocyte Estrace 2-5 red blood cells and a few bacteria in the microscopic exam TSH was normal at 1.020 blood alcohol level is 0 pregnancy test is negative    Radiology  I have spoken with the radiologist regarding the patient's preliminary MRI report and there is some nonspecific white matter changes but according to the night radiologist nothing definitively looking like MS.  The study will be over read by the neuroradiologist tomorrow.    MEDICAL DECISION MAKING and DISPOSITION  In the emergency department the patient was given intravenous Ativan just before her  MRI due to anxiety.  I have reviewed all the findings in detail with her and her significant other and at this point in time I do not think that she needs to stay in the hospital I think will be safe for her evaluation to continue on an outpatient basis.  She has a primary care doctor's appointment tomorrow so I have advised her to speak to her doctor and her doctor can look up the final MRI report once it is given by the neuroradiologist.  In addition I recommended that the patient call Dr. Thurston's on his office and arrange neurology consultation    IMPRESSION  1.  Headache syndrome of unknown etiology  2.  Epigastric abdominal pain of unknown etiology         Electronically signed by: Josue Woo M.D., 3/3/2020 7:39 PM

## 2020-03-04 NOTE — ED NOTES
"Pt discharged home via ambulatory to lobby with steady gait, AOx4, family accompanying. IV discontinued and gauze placed, pt in possession of belongings. Pt provided discharge education and information pertaining to medications and follow up appointments. Pt received copy of discharge instructions and verbalized understanding.     BP (!) 98/52   Pulse 90   Temp 36.6 °C (97.9 °F) (Temporal)   Resp 17   Ht 1.549 m (5' 1\")   Wt 67.2 kg (148 lb 2.4 oz)   LMP 02/26/2020   SpO2 95%   BMI 27.99 kg/m²   "

## 2020-03-04 NOTE — DISCHARGE INSTRUCTIONS
Call your doctor in the morning and arrange office follow-up this week and ask your doctor to follow-up on the neuro radiologist interpretation of your MRI once that is available.  Also call the neurology clinic and make an appointment with the neurologist as soon as possible for consultation

## 2020-03-10 ENCOUNTER — HOSPITAL ENCOUNTER (OUTPATIENT)
Dept: RADIOLOGY | Facility: MEDICAL CENTER | Age: 25
End: 2020-03-10

## 2020-04-06 ENCOUNTER — APPOINTMENT (OUTPATIENT)
Dept: RADIOLOGY | Facility: MEDICAL CENTER | Age: 25
End: 2020-04-06
Attending: EMERGENCY MEDICINE

## 2020-04-06 ENCOUNTER — HOSPITAL ENCOUNTER (EMERGENCY)
Facility: MEDICAL CENTER | Age: 25
End: 2020-04-06
Attending: EMERGENCY MEDICINE

## 2020-04-06 VITALS
BODY MASS INDEX: 25.49 KG/M2 | RESPIRATION RATE: 20 BRPM | HEART RATE: 84 BPM | DIASTOLIC BLOOD PRESSURE: 63 MMHG | SYSTOLIC BLOOD PRESSURE: 98 MMHG | OXYGEN SATURATION: 98 % | WEIGHT: 135 LBS | TEMPERATURE: 99.5 F | HEIGHT: 61 IN

## 2020-04-06 DIAGNOSIS — B34.9 VIRAL SYNDROME: ICD-10-CM

## 2020-04-06 DIAGNOSIS — R55 SYNCOPE, UNSPECIFIED SYNCOPE TYPE: ICD-10-CM

## 2020-04-06 DIAGNOSIS — R05.9 COUGH: ICD-10-CM

## 2020-04-06 LAB
ALBUMIN SERPL BCP-MCNC: 4.7 G/DL (ref 3.2–4.9)
ALBUMIN/GLOB SERPL: 1.3 G/DL
ALP SERPL-CCNC: 70 U/L (ref 30–99)
ALT SERPL-CCNC: 21 U/L (ref 2–50)
ANION GAP SERPL CALC-SCNC: 17 MMOL/L (ref 7–16)
AST SERPL-CCNC: 20 U/L (ref 12–45)
BASOPHILS # BLD AUTO: 0.4 % (ref 0–1.8)
BASOPHILS # BLD: 0.03 K/UL (ref 0–0.12)
BILIRUB SERPL-MCNC: 0.3 MG/DL (ref 0.1–1.5)
BUN SERPL-MCNC: 12 MG/DL (ref 8–22)
CALCIUM SERPL-MCNC: 9.8 MG/DL (ref 8.5–10.5)
CHLORIDE SERPL-SCNC: 101 MMOL/L (ref 96–112)
CO2 SERPL-SCNC: 21 MMOL/L (ref 20–33)
CREAT SERPL-MCNC: 0.58 MG/DL (ref 0.5–1.4)
EKG IMPRESSION: NORMAL
EOSINOPHIL # BLD AUTO: 0.04 K/UL (ref 0–0.51)
EOSINOPHIL NFR BLD: 0.5 % (ref 0–6.9)
ERYTHROCYTE [DISTWIDTH] IN BLOOD BY AUTOMATED COUNT: 44.3 FL (ref 35.9–50)
GLOBULIN SER CALC-MCNC: 3.7 G/DL (ref 1.9–3.5)
GLUCOSE SERPL-MCNC: 89 MG/DL (ref 65–99)
HCT VFR BLD AUTO: 40.9 % (ref 37–47)
HGB BLD-MCNC: 12.7 G/DL (ref 12–16)
IMM GRANULOCYTES # BLD AUTO: 0.02 K/UL (ref 0–0.11)
IMM GRANULOCYTES NFR BLD AUTO: 0.3 % (ref 0–0.9)
LYMPHOCYTES # BLD AUTO: 3.09 K/UL (ref 1–4.8)
LYMPHOCYTES NFR BLD: 40.8 % (ref 22–41)
MCH RBC QN AUTO: 25.8 PG (ref 27–33)
MCHC RBC AUTO-ENTMCNC: 31.1 G/DL (ref 33.6–35)
MCV RBC AUTO: 83.1 FL (ref 81.4–97.8)
MONOCYTES # BLD AUTO: 0.42 K/UL (ref 0–0.85)
MONOCYTES NFR BLD AUTO: 5.5 % (ref 0–13.4)
NEUTROPHILS # BLD AUTO: 3.98 K/UL (ref 2–7.15)
NEUTROPHILS NFR BLD: 52.5 % (ref 44–72)
NRBC # BLD AUTO: 0 K/UL
NRBC BLD-RTO: 0 /100 WBC
PLATELET # BLD AUTO: 341 K/UL (ref 164–446)
PMV BLD AUTO: 10.9 FL (ref 9–12.9)
POTASSIUM SERPL-SCNC: 4.1 MMOL/L (ref 3.6–5.5)
PROT SERPL-MCNC: 8.4 G/DL (ref 6–8.2)
RBC # BLD AUTO: 4.92 M/UL (ref 4.2–5.4)
SODIUM SERPL-SCNC: 139 MMOL/L (ref 135–145)
WBC # BLD AUTO: 7.6 K/UL (ref 4.8–10.8)

## 2020-04-06 PROCEDURE — A9270 NON-COVERED ITEM OR SERVICE: HCPCS | Performed by: EMERGENCY MEDICINE

## 2020-04-06 PROCEDURE — 80053 COMPREHEN METABOLIC PANEL: CPT

## 2020-04-06 PROCEDURE — 36415 COLL VENOUS BLD VENIPUNCTURE: CPT

## 2020-04-06 PROCEDURE — 85025 COMPLETE CBC W/AUTO DIFF WBC: CPT

## 2020-04-06 PROCEDURE — 700105 HCHG RX REV CODE 258: Performed by: EMERGENCY MEDICINE

## 2020-04-06 PROCEDURE — 93005 ELECTROCARDIOGRAM TRACING: CPT | Performed by: EMERGENCY MEDICINE

## 2020-04-06 PROCEDURE — 700102 HCHG RX REV CODE 250 W/ 637 OVERRIDE(OP): Performed by: EMERGENCY MEDICINE

## 2020-04-06 PROCEDURE — 71045 X-RAY EXAM CHEST 1 VIEW: CPT

## 2020-04-06 PROCEDURE — 99284 EMERGENCY DEPT VISIT MOD MDM: CPT

## 2020-04-06 RX ORDER — CODEINE PHOSPHATE AND GUAIFENESIN 10; 100 MG/5ML; MG/5ML
5 SOLUTION ORAL EVERY 4 HOURS PRN
Qty: 100 ML | Refills: 0 | Status: SHIPPED | OUTPATIENT
Start: 2020-04-06 | End: 2020-04-11

## 2020-04-06 RX ORDER — IPRATROPIUM BROMIDE AND ALBUTEROL SULFATE 2.5; .5 MG/3ML; MG/3ML
3 SOLUTION RESPIRATORY (INHALATION) 4 TIMES DAILY
Status: SHIPPED | COMMUNITY
End: 2021-11-01

## 2020-04-06 RX ORDER — SODIUM CHLORIDE, SODIUM LACTATE, POTASSIUM CHLORIDE, CALCIUM CHLORIDE 600; 310; 30; 20 MG/100ML; MG/100ML; MG/100ML; MG/100ML
1000 INJECTION, SOLUTION INTRAVENOUS ONCE
Status: COMPLETED | OUTPATIENT
Start: 2020-04-06 | End: 2020-04-06

## 2020-04-06 RX ORDER — ACETAMINOPHEN 325 MG/1
650 TABLET ORAL ONCE
Status: COMPLETED | OUTPATIENT
Start: 2020-04-06 | End: 2020-04-06

## 2020-04-06 RX ADMIN — SODIUM CHLORIDE, POTASSIUM CHLORIDE, SODIUM LACTATE AND CALCIUM CHLORIDE 1000 ML: 600; 310; 30; 20 INJECTION, SOLUTION INTRAVENOUS at 21:57

## 2020-04-06 RX ADMIN — ACETAMINOPHEN 650 MG: 325 TABLET, FILM COATED ORAL at 21:56

## 2020-04-06 ASSESSMENT — FIBROSIS 4 INDEX: FIB4 SCORE: 0.38

## 2020-04-07 NOTE — ED TRIAGE NOTES
"Chief Complaint   Patient presents with   • Syncope     pt was recently tested on friday for COVID due to body aches, diarrhea, and cough. however test vial broke. pt presents with syncopal episode with loss of concsienceness         Pt walk in today to the tent for the above complaint. Pt has had all the symptoms except for fever. Was seen at [facility] urgent care on Friday, however the test vial broke and pt never got results    /61   Pulse 91   Temp 37.5 °C (99.5 °F) (Temporal)   Resp 18   Ht 1.549 m (5' 1\")   Wt 61.2 kg (135 lb)   SpO2 96%   BMI 25.51 kg/m²     "

## 2020-04-07 NOTE — DISCHARGE INSTRUCTIONS
You likely have a viral illness and may have COVID-19. At this point we do not have testing available in the outpatient setting here in the emergency department for COVID-19. We also have limited testing for flu and other viral illnesses due to national shortages.  Therefore, COVID-19 is not ruled out and you will need to remain in home quarantine until all three of the following are true:  You are 7 days from symptom onset  your symptoms are improving   you have been fever free for at least 72hours.  Testing is only occurring through the [facility] at this point; you may call them at [phone].  After a phone triage process you may or may not be tested.  If you develop significant shortness of breath, meaning that it is difficult for you to walk even short distances without having to stop and catch your breath, or you become severely dizzy and this is persistent then please return to the emergency department.

## 2020-04-07 NOTE — ED NOTES
Pt also stated to have hit her head when she fell. Pt hit the R side of her forehead. Pt has slight goose egg on her forehead.

## 2020-04-07 NOTE — ED NOTES
Pt provided discharge instructions. Pt verbalized understanding. Pt leaving ER in stable condition

## 2020-04-07 NOTE — ED PROVIDER NOTES
"ED Provider Note    CHIEF COMPLAINT  Chief Complaint   Patient presents with   • Syncope     pt was recently tested on friday for COVID due to body aches, diarrhea, and cough. however test vial broke. pt presents with syncopal episode with loss of concsienceness       HPI  Sadia Troncoso is a 24 y.o. female who presents with 1 week of cough body aches and diarrhea.  She says it started with the diarrhea for a few days and then she has had a cough.  She feels pain in her chest and feels like she is waterlogged.  She has been having subjective fevers.  She is getting ready to come to the hospital today and blacked out when she is putting her clothes on.  She says she is eating and drinking.  She says her taste sensation is altered.  She went to [place] [facility] drive-through testing and was supposedly tested for covid home with a viral broke.  She was sent here for testing        REVIEW OF SYSTEMS  positive for fever chills body aches cough, negative for vomiting. All other systems are negative.     PAST MEDICAL HISTORY   has a past medical history of Anxiety and Seizures ([facility]).    SOCIAL HISTORY  Social History     Tobacco Use   • Smoking status: Current Some Day Smoker   • Smokeless tobacco: Never Used   Substance and Sexual Activity   • Alcohol use: Yes     Frequency: Monthly or less     Binge frequency: Less than monthly   • Drug use: No   • Sexual activity: Not on file       SURGICAL HISTORY  patient denies any surgical history    CURRENT MEDICATIONS  Home Medications     Reviewed by Kylie Chavez R.N. (Registered Nurse) on 04/06/20 at 4310  Med List Status: Complete   Medication Last Dose Status   clonazepam (KLONOPIN) 2 MG tablet  Active   ipratropium-albuterol (DUONEB) 0.5-2.5 (3) MG/3ML nebulizer solution  Active                ALLERGIES  No Known Allergies    PHYSICAL EXAM  VITAL SIGNS: /61   Pulse 91   Temp 37.5 °C (99.5 °F) (Temporal)   Resp 18   Ht 1.549 m (5' 1\")   Wt 61.2 kg (135 lb)  "  SpO2 96%   BMI 25.51 kg/m² .  Constitutional: Alert in no apparent distress.  HENT: No signs of trauma, Bilateral external ears normal, Nose normal.   Eyes: Pupils are equal and reactive, Conjunctiva normal, Non-icteric.   Neck: Normal range of motion, No tenderness, Supple, No stridor.   Cardiovascular: Regular rate and rhythm, no murmurs.   Thorax & Lungs: Normal breath sounds, No respiratory distress, No wheezing, No chest tenderness.   Abdomen: Bowel sounds normal, Soft, No tenderness, No masses, No peritoneal signs.  Skin: Warm, Dry, No erythema, No rash.   Musculoskeletal:  no major deformities noted.   Neurologic: Alert,  No focal deficits noted.   Psychiatric: Affect normal, Judgment normal, Mood normal.       DIAGNOSTIC STUDIES / PROCEDURES      EKG  Results for orders placed or performed during the hospital encounter of 20   EKG   Result Value Ref Range    Report       [facility] Emergency Dept.    Test Date:  2020  Pt Name:    JONATHAN DICKERSON                 Department: ER  MRN:        [id number]                      Room:        02  Gender:     Female                       Technician: [id number]  :        1995                   Requested By:PAUL RAMOS  Order #:    [id number]                    Reading MD: PAUL RAMOS    Measurements  Intervals                                Axis  Rate:       86                           P:          16  AK:         168                          QRS:        54  QRSD:       84                           T:          43  QT:         364  QTc:        436    Interpretive Statements  SINUS RHYTHM  BASELINE WANDER IN LEAD(S) V2  Compared to ECG 2020 00:43:54  Atrial flutter no longer present  Electronically Signed On 2020 21:37:06 PDT by PAUL RAMOS     '    LABS  Labs Reviewed   CBC WITH DIFFERENTIAL - Abnormal; Notable for the following components:       Result Value    MCH 25.8 (*)     MCHC 31.1 (*)     All other components  within normal limits   COMP METABOLIC PANEL - Abnormal; Notable for the following components:    Anion Gap 17.0 (*)     Total Protein 8.4 (*)     Globulin 3.7 (*)     All other components within normal limits   ESTIMATED GFR       RADIOLOGY  DX-CHEST-PORTABLE (1 VIEW)   Final Result      No radiographic evidence of acute cardiopulmonary process.              COURSE & MEDICAL DECISION MAKING  Pertinent Labs & Imaging studies reviewed. (See chart for details)    This is a 24-year-old female that presents with little over a week of cough and diarrhea body aches.  Her symptoms are consistent with a viral illness.  It is highly likely that she may have covid however she is not hypoxic she is not hypotensive she is not febrile.  Her labs she has a normal white count without a lymphopenia.  Her x-ray does not show any evidence of multifocal pneumonia.  I suspect that she has a viral illness of some sort it is possible that she is covid.  However she does not need to be admitted and therefore we are not testing outpatient at this time.  She will be given IV fluids for hydration given her syncopal episode.  I have recommended that she self quarantine and return for worsening symptoms.  Patient is agreeable.  She will be given guaifenesin with codeine to help with her cough.    The patient will return for new or worsening symptoms and is stable at the time of discharge. Patient was given return precautions. Patient and/or family member verbalizes understanding and will comply.    DISPOSITION:  Patient will be discharged home in stable condition.    FOLLOW UP:  Kishan Alanis M.D.  [address] [place] [id number]  [phone]    Schedule an appointment as soon as possible for a visit   As needed    [facility], Emergency Dept  [address] [facility] [address] [phone]    Return for worsening shortness of breath, fevers, worsening weakness or other concerns.      OUTPATIENT  MEDICATIONS:  New Prescriptions    GUAIFENESIN-CODEINE (GUAIFENESIN AC) SOLUTION ORAL SOLUTION    Take 5 mL by mouth every four hours as needed for Cough for up to 5 days.         FINAL IMPRESSION  1. Syncope, unspecified syncope type     2. Viral syndrome     3. Cough  guaifenesin-codeine (GUAIFENESIN AC) Solution oral solution         This dictation has been creating using voice recognition software. The accuracy of the dictation is limited the abilities of the software.  I expect there may be some errors of grammar and possibly content. I made every attempt to manually correct the errors within my dictation. However errors related to this voice recognition software may still exist and should be interpreted within the appropriate context.      The note accurately reflects work and decisions made by me.  Dana Bella M.D.  4/6/2020  9:44 PM

## 2020-04-08 ENCOUNTER — HOSPITAL ENCOUNTER (EMERGENCY)
Facility: MEDICAL CENTER | Age: 25
End: 2020-04-08
Attending: EMERGENCY MEDICINE

## 2020-04-08 ENCOUNTER — APPOINTMENT (OUTPATIENT)
Dept: RADIOLOGY | Facility: MEDICAL CENTER | Age: 25
End: 2020-04-08
Attending: EMERGENCY MEDICINE

## 2020-04-08 VITALS
DIASTOLIC BLOOD PRESSURE: 61 MMHG | WEIGHT: 135 LBS | HEIGHT: 61 IN | BODY MASS INDEX: 25.49 KG/M2 | TEMPERATURE: 98.6 F | OXYGEN SATURATION: 96 % | HEART RATE: 84 BPM | RESPIRATION RATE: 16 BRPM | SYSTOLIC BLOOD PRESSURE: 95 MMHG

## 2020-04-08 DIAGNOSIS — M79.605 LEFT LEG PAIN: ICD-10-CM

## 2020-04-08 LAB
ANION GAP SERPL CALC-SCNC: 13 MMOL/L (ref 7–16)
BASOPHILS # BLD AUTO: 0.5 % (ref 0–1.8)
BASOPHILS # BLD: 0.03 K/UL (ref 0–0.12)
BUN SERPL-MCNC: 11 MG/DL (ref 8–22)
CALCIUM SERPL-MCNC: 9.5 MG/DL (ref 8.5–10.5)
CHLORIDE SERPL-SCNC: 104 MMOL/L (ref 96–112)
CO2 SERPL-SCNC: 21 MMOL/L (ref 20–33)
CREAT SERPL-MCNC: 0.61 MG/DL (ref 0.5–1.4)
EOSINOPHIL # BLD AUTO: 0.04 K/UL (ref 0–0.51)
EOSINOPHIL NFR BLD: 0.7 % (ref 0–6.9)
ERYTHROCYTE [DISTWIDTH] IN BLOOD BY AUTOMATED COUNT: 43.8 FL (ref 35.9–50)
GLUCOSE SERPL-MCNC: 92 MG/DL (ref 65–99)
HCG SERPL QL: NEGATIVE
HCT VFR BLD AUTO: 37.7 % (ref 37–47)
HGB BLD-MCNC: 11.9 G/DL (ref 12–16)
IMM GRANULOCYTES # BLD AUTO: 0.02 K/UL (ref 0–0.11)
IMM GRANULOCYTES NFR BLD AUTO: 0.3 % (ref 0–0.9)
LYMPHOCYTES # BLD AUTO: 2.31 K/UL (ref 1–4.8)
LYMPHOCYTES NFR BLD: 39.2 % (ref 22–41)
MCH RBC QN AUTO: 26.3 PG (ref 27–33)
MCHC RBC AUTO-ENTMCNC: 31.6 G/DL (ref 33.6–35)
MCV RBC AUTO: 83.2 FL (ref 81.4–97.8)
MONOCYTES # BLD AUTO: 0.42 K/UL (ref 0–0.85)
MONOCYTES NFR BLD AUTO: 7.1 % (ref 0–13.4)
NEUTROPHILS # BLD AUTO: 3.08 K/UL (ref 2–7.15)
NEUTROPHILS NFR BLD: 52.2 % (ref 44–72)
NRBC # BLD AUTO: 0 K/UL
NRBC BLD-RTO: 0 /100 WBC
PLATELET # BLD AUTO: 322 K/UL (ref 164–446)
PMV BLD AUTO: 10.7 FL (ref 9–12.9)
POTASSIUM SERPL-SCNC: 3.8 MMOL/L (ref 3.6–5.5)
RBC # BLD AUTO: 4.53 M/UL (ref 4.2–5.4)
SODIUM SERPL-SCNC: 138 MMOL/L (ref 135–145)
WBC # BLD AUTO: 5.9 K/UL (ref 4.8–10.8)

## 2020-04-08 PROCEDURE — 84703 CHORIONIC GONADOTROPIN ASSAY: CPT

## 2020-04-08 PROCEDURE — 99285 EMERGENCY DEPT VISIT HI MDM: CPT

## 2020-04-08 PROCEDURE — 700102 HCHG RX REV CODE 250 W/ 637 OVERRIDE(OP): Performed by: EMERGENCY MEDICINE

## 2020-04-08 PROCEDURE — 80048 BASIC METABOLIC PNL TOTAL CA: CPT

## 2020-04-08 PROCEDURE — 700111 HCHG RX REV CODE 636 W/ 250 OVERRIDE (IP): Performed by: EMERGENCY MEDICINE

## 2020-04-08 PROCEDURE — A9270 NON-COVERED ITEM OR SERVICE: HCPCS | Performed by: EMERGENCY MEDICINE

## 2020-04-08 PROCEDURE — 85025 COMPLETE CBC W/AUTO DIFF WBC: CPT

## 2020-04-08 PROCEDURE — 93971 EXTREMITY STUDY: CPT | Mod: LT

## 2020-04-08 PROCEDURE — 96374 THER/PROPH/DIAG INJ IV PUSH: CPT

## 2020-04-08 RX ORDER — KETOROLAC TROMETHAMINE 30 MG/ML
15 INJECTION, SOLUTION INTRAMUSCULAR; INTRAVENOUS ONCE
Status: COMPLETED | OUTPATIENT
Start: 2020-04-08 | End: 2020-04-08

## 2020-04-08 RX ORDER — HYDROCODONE BITARTRATE AND ACETAMINOPHEN 5; 325 MG/1; MG/1
1 TABLET ORAL ONCE
Status: COMPLETED | OUTPATIENT
Start: 2020-04-08 | End: 2020-04-08

## 2020-04-08 RX ADMIN — KETOROLAC TROMETHAMINE 15 MG: 30 INJECTION, SOLUTION INTRAMUSCULAR at 21:27

## 2020-04-08 RX ADMIN — HYDROCODONE BITARTRATE AND ACETAMINOPHEN 1 TABLET: 5; 325 TABLET ORAL at 22:07

## 2020-04-08 ASSESSMENT — ENCOUNTER SYMPTOMS
FEVER: 0
FOCAL WEAKNESS: 0
SORE THROAT: 0
SHORTNESS OF BREATH: 1
EYE REDNESS: 0
CHILLS: 0
NECK PAIN: 0
VOMITING: 0
ABDOMINAL PAIN: 0
COUGH: 1
BLURRED VISION: 0
HEADACHES: 0
MYALGIAS: 1
SEIZURES: 0
BACK PAIN: 0

## 2020-04-08 ASSESSMENT — FIBROSIS 4 INDEX: FIB4 SCORE: 0.33

## 2020-04-09 NOTE — ED TRIAGE NOTES
"25 y/o female bib wheelchair to triage with c/o left lower leg pain. Pt states she has been \"sick for two weeks and bed ridden\". She states she is concerned about having a blood clot. Pt taking cough syrup at home, she was tested for Covid-19, test came back negative.   "

## 2020-04-09 NOTE — ED PROVIDER NOTES
ED Provider Note    CHIEF COMPLAINT  Chief Complaint   Patient presents with   • Leg Pain       HPI  Sadia Troncoso is a 24 y.o. female with past medical history of anxiety and possible seizures and multiple sclerosis who presents with left lower extremity pain.  Patient states she has been sick for the last 2 weeks with symptoms of a viral infection.  She has had a fever as high as 99, cough, congestion.  She has had some diarrhea as well.  She feels that the symptoms are improving.  She was tested for COVID19 at outside facility which reportedly returned negative.  Over the last 2 days however, she is developed severe pain in the left calf.  She states the pain is worse with flexion of her ankle and ambulation.  No significant swelling.  Again, the patient has had some intermittent chest pain and shortness of breath with her recent viral infection however she does think that the symptoms are improving.  She has no history of blood clots and denies chance of pregnancy.  No history of injury.    REVIEW OF SYSTEMS  See HPI for further details.   Review of Systems   Constitutional: Negative for chills and fever.   HENT: Negative for sore throat.    Eyes: Negative for blurred vision and redness.   Respiratory: Positive for cough and shortness of breath.    Cardiovascular: Positive for chest pain. Negative for leg swelling.   Gastrointestinal: Negative for abdominal pain and vomiting.   Genitourinary: Negative for dysuria and urgency.   Musculoskeletal: Positive for myalgias. Negative for back pain and neck pain.        Left lower extremity pain   Skin: Negative for rash.   Neurological: Negative for focal weakness, seizures and headaches.   Psychiatric/Behavioral: Negative for suicidal ideas.         PAST MEDICAL HISTORY   has a past medical history of Anxiety and Seizures ([facility]).    SOCIAL HISTORY  Social History     Tobacco Use   • Smoking status: Current Some Day Smoker   • Smokeless tobacco: Never Used   Substance and  "Sexual Activity   • Alcohol use: Yes     Frequency: Monthly or less     Binge frequency: Less than monthly   • Drug use: No   • Sexual activity: Not on file       SURGICAL HISTORY  patient denies any surgical history    CURRENT MEDICATIONS  Home Medications     Reviewed by Christian Franco R.N. (Registered Nurse) on 04/08/20 at 1859  Med List Status: Partial   Medication Last Dose Status   clonazepam (KLONOPIN) 2 MG tablet 4/7/2020 Active   guaifenesin-codeine (GUAIFENESIN AC) Solution oral solution 4/8/2020 Active   ipratropium-albuterol (DUONEB) 0.5-2.5 (3) MG/3ML nebulizer solution 4/8/2020 Active                ALLERGIES  No Known Allergies    PHYSICAL EXAM   VITAL SIGNS: BP (!) 95/61 Comment: ERP okay  Pulse 84   Temp 37 °C (98.6 °F) (Temporal)   Resp 16   Ht 1.549 m (5' 1\")   Wt 61.2 kg (135 lb)   LMP 03/20/2020   SpO2 96%   BMI 25.51 kg/m²      Physical Exam   Constitutional: She is oriented to person, place, and time and well-developed, well-nourished, and in no distress. No distress.   Nontoxic-appearing young female   HENT:   Head: Normocephalic and atraumatic.   Eyes: Pupils are equal, round, and reactive to light. Conjunctivae are normal.   Neck: Normal range of motion. Neck supple.   Cardiovascular: Normal rate, regular rhythm and normal heart sounds.   Pulmonary/Chest: Effort normal and breath sounds normal. No respiratory distress.   Abdominal: Soft. She exhibits no distension. There is no abdominal tenderness.   Musculoskeletal: Normal range of motion.         General: Tenderness present. No edema.      Comments: Left lower extremity without any swelling or deformity.  2+ DP and PT pulses bilaterally.  Patient is resistant to flex at her ankle due to pain however there is no surrounding joint swelling or erythema.  Her pain is more localized to the posterior calf.   Neurological: She is alert and oriented to person, place, and time.   Moving all extremities spontaneously.  Motor and " sensation intact distally.   Skin: Skin is warm and dry.   Psychiatric: Affect normal.         DIAGNOSTIC STUDIES    LABS  Personally reviewed by me  Labs Reviewed   CBC WITH DIFFERENTIAL - Abnormal; Notable for the following components:       Result Value    Hemoglobin 11.9 (*)     MCH 26.3 (*)     MCHC 31.6 (*)     All other components within normal limits   BASIC METABOLIC PANEL   HCG QUAL SERUM   ESTIMATED GFR           RADIOLOGY  Personally reviewed by me  US-EXTREMITY VENOUS LOWER UNILAT LEFT   Final Result            ED COURSE  Vitals:    04/08/20 1856 04/08/20 2017 04/08/20 2101 04/08/20 2201   BP:  108/68 (!) 98/64 (!) 95/61   Pulse:  82 75 84   Resp:  16 16 16   Temp:    37 °C (98.6 °F)   TempSrc:    Temporal   SpO2:  95% 96% 96%   Weight: 61.2 kg (135 lb)      Height:             Medications administered:  Medications   ketorolac (TORADOL) injection 15 mg (15 mg Intravenous Given 4/8/20 2127)   HYDROcodone-acetaminophen (NORCO) 5-325 MG per tablet 1 Tab (1 Tab Oral Given 4/8/20 2207)         Old records personally reviewed:  Patient was seen here at the beginning of March 2028 for neurologic symptoms.  She states that she is worried about a diagnosis of multiple sclerosis however she did have an MRI of her brain and cervical spine at that time which was normal.  She was seen 2 days ago for her viral illness and syncope with a reassuring work-up.        MEDICAL DECISION MAKING  Young otherwise healthy female who presents with left calf pain in the setting of a couple weeks of viral illness.  She is afebrile with reassuring vital signs on arrival and nontoxic-appearing.  Her exam is unremarkable.  There is no evidence of neurovascular compromise or septic arthritis.  No significant swelling or signs of traumatic injury.  No signs of infectious cause such as cellulitis or abscess.  Ultrasound is negative for DVT.  Labs are reassuring without significant electrolyte abnormality.  I suspect possible muscular  injury.  Patient will be discharged home with symptomatic care.    Of note, she has been ill with a viral illness that may be consistent with COVID-19 however she states that she did receive a negative test results today.  She understands that this test is not perfect and she should continue to quarantine until her symptoms are resolved.    10:40 PM - Upon reassessment, patient is resting comfortably with borderline low blood pressure however otherwise normal vitals.  I suspect this is baseline for the patient.  She is able to range her ankle much more following Toradol.  Discussed results with patient and/or family as well as importance of primary care follow up.  Patient understands plan of care and strict return precautions for new or changing symptoms.       IMPRESSION  (M79.605) Left leg pain    Disposition: Discharge home, stable condition  Results, diagnoses, and treatment options were discussed with the patient and/or family. Patient verbalized understanding of plan of care.    Patient referred to primary care provider for monitoring and treatment of blood pressure.      Discharge Medication List as of 4/8/2020 10:10 PM              Electronically signed by: Bess Brooks M.D., 4/8/2020 8:29 PM

## 2020-04-09 NOTE — ED NOTES
The patient has been provided with discharge education and information.  The patient was also provided with instructions on follow up care and return precautions.  The patient verbalizes understanding of discharge instructions, follow up care, and return precautions.  All questions have been answered.   NAD, A/Ox4, good color and appropriate at time of discharge.  Patient ambulated steady gait out of department.  Patient reassured this RN that her family is picking her up.

## 2020-04-09 NOTE — ED NOTES
Patient wheeled chair to room, agree with triage note.  Patient in no acute distress.  Patient on monitor.

## 2020-04-09 NOTE — DISCHARGE INSTRUCTIONS
You were seen in the Emergency Department for leg pain.    Labs, ultrasound were completed without significant acute abnormalities.    Please use 1,000mg of tylenol or 600mg of ibuprofen every 6 hours as needed for pain.    Please follow up with your primary care physician.    Return to the Emergency Department with uncontrolled pain, worsening chest pain or trouble breathing, fevers, or other concerns.

## 2020-05-14 ENCOUNTER — OFFICE VISIT (OUTPATIENT)
Dept: URGENT CARE | Facility: PHYSICIAN GROUP | Age: 25
End: 2020-05-14

## 2020-05-14 VITALS
TEMPERATURE: 99 F | OXYGEN SATURATION: 96 % | DIASTOLIC BLOOD PRESSURE: 64 MMHG | WEIGHT: 140 LBS | RESPIRATION RATE: 20 BRPM | HEIGHT: 61 IN | HEART RATE: 87 BPM | BODY MASS INDEX: 26.43 KG/M2 | SYSTOLIC BLOOD PRESSURE: 98 MMHG

## 2020-05-14 DIAGNOSIS — H92.01 RIGHT EAR PAIN: ICD-10-CM

## 2020-05-14 DIAGNOSIS — H92.01 MASTOID PAIN, RIGHT: ICD-10-CM

## 2020-05-14 DIAGNOSIS — R11.2 NAUSEA AND VOMITING, INTRACTABILITY OF VOMITING NOT SPECIFIED, UNSPECIFIED VOMITING TYPE: ICD-10-CM

## 2020-05-14 PROCEDURE — 99214 OFFICE O/P EST MOD 30 MIN: CPT | Performed by: NURSE PRACTITIONER

## 2020-05-14 RX ORDER — IBUPROFEN 200 MG
400 TABLET ORAL ONCE
Status: COMPLETED | OUTPATIENT
Start: 2020-05-14 | End: 2020-05-14

## 2020-05-14 RX ORDER — ONDANSETRON 4 MG/1
4 TABLET, ORALLY DISINTEGRATING ORAL ONCE
Status: COMPLETED | OUTPATIENT
Start: 2020-05-14 | End: 2020-05-14

## 2020-05-14 RX ADMIN — Medication 400 MG: at 16:27

## 2020-05-14 RX ADMIN — ONDANSETRON 4 MG: 4 TABLET, ORALLY DISINTEGRATING ORAL at 17:20

## 2020-05-14 ASSESSMENT — FIBROSIS 4 INDEX: FIB4 SCORE: 0.35

## 2020-05-14 ASSESSMENT — ENCOUNTER SYMPTOMS
SORE THROAT: 0
HEADACHES: 1
ORTHOPNEA: 0
MYALGIAS: 0
ABDOMINAL PAIN: 0
NAUSEA: 1
PALPITATIONS: 0
VOMITING: 1
WHEEZING: 0
NECK PAIN: 1
CONSTIPATION: 0
TINGLING: 0
CHILLS: 0
HEARTBURN: 0
SHORTNESS OF BREATH: 0
FEVER: 1
MEMORY LOSS: 0
DIZZINESS: 0
COUGH: 0
BACK PAIN: 0

## 2020-05-14 ASSESSMENT — PAIN SCALES - GENERAL: PAINLEVEL: 8=MODERATE-SEVERE PAIN

## 2020-05-14 NOTE — PROGRESS NOTES
"Subjective:      Sadia Troncoso is a 24 y.o. female who presents with Ear Pain (R ear pain x1 day headache, neck pain, x3 days)            Patient presents to  with c/o headache, right ear pain x 3 days.    Otalgia    There is pain in the right ear. This is a new problem. The current episode started yesterday. The problem occurs constantly. The problem has been gradually worsening. Maximum temperature: subjective. The pain is at a severity of 9/10. The pain is severe. Associated symptoms include headaches, neck pain and vomiting. Pertinent negatives include no abdominal pain, coughing, hearing loss, rash or sore throat. She has tried NSAIDs and acetaminophen for the symptoms. The treatment provided no relief.       Review of Systems   Constitutional: Positive for fever. Negative for chills and malaise/fatigue.   HENT: Positive for ear pain. Negative for congestion, hearing loss and sore throat.    Respiratory: Negative for cough, shortness of breath and wheezing.    Cardiovascular: Negative for chest pain, palpitations, orthopnea and leg swelling.   Gastrointestinal: Positive for nausea and vomiting. Negative for abdominal pain, constipation and heartburn.   Musculoskeletal: Positive for neck pain. Negative for back pain, joint pain and myalgias.   Skin: Negative for rash.   Neurological: Positive for headaches. Negative for dizziness and tingling.   Psychiatric/Behavioral: Negative for memory loss and suicidal ideas.   All other systems reviewed and are negative.         Objective:     BP (!) 98/64   Pulse 87   Temp 37.2 °C (99 °F) (Temporal)   Resp 20   Ht 1.549 m (5' 1\")   Wt 63.5 kg (140 lb)   LMP 04/30/2020 (Approximate)   SpO2 96%   BMI 26.45 kg/m²      Physical Exam  Vitals signs reviewed.   Constitutional:       General: She is in acute distress.      Appearance: Normal appearance. She is ill-appearing.   HENT:      Head: Normocephalic and atraumatic.      Right Ear: Tympanic membrane and ear canal " normal. Tenderness present. No drainage or swelling. There is no impacted cerumen. No foreign body. There is mastoid tenderness. Tympanic membrane is not injected, erythematous or retracted.      Left Ear: Tympanic membrane and ear canal normal. No mastoid tenderness.      Ears:      Comments: Right Ear/Mastoid: Pain out of proportion with palpation of mastoid or any manipulation of the auricle.     Mouth/Throat:      Lips: Pink.      Mouth: Mucous membranes are moist.      Pharynx: No pharyngeal swelling, oropharyngeal exudate or uvula swelling.   Eyes:      Extraocular Movements: Extraocular movements intact.      Pupils: Pupils are equal, round, and reactive to light.   Neck:      Musculoskeletal: Normal range of motion and neck supple.   Cardiovascular:      Rate and Rhythm: Normal rate and regular rhythm.      Pulses: Normal pulses.      Heart sounds: No friction rub. No gallop.    Pulmonary:      Effort: Pulmonary effort is normal. No respiratory distress.      Breath sounds: Normal breath sounds.   Abdominal:      General: Bowel sounds are normal. There is no distension.      Palpations: Abdomen is soft. There is no mass.   Musculoskeletal: Normal range of motion.         General: No tenderness.      Right lower leg: No edema.      Left lower leg: No edema.   Skin:     General: Skin is warm and dry.      Capillary Refill: Capillary refill takes less than 2 seconds.   Neurological:      General: No focal deficit present.      Mental Status: She is alert and oriented to person, place, and time.   Psychiatric:         Mood and Affect: Mood normal.         Behavior: Behavior normal.                 Assessment/Plan:       1. Mastoid pain, right  2. Otalgia, right  - CT-HEAD W/O; Future (cancelled)  - ibuprofen (MOTRIN) tablet 400 mg  - ondansetron (ZOFRAN ODT) dispertab 4 mg    2. Nausea and vomiting, intractability of vomiting not specified, unspecified vomiting type  - ondansetron (ZOFRAN ODT) dispertab 4  mg      At this time, I feel the patient requires a higher level of care including closer monitoring, stat lab work and/or imaging for further evaluation for complaints of ear pain and r/o mastoiditis.  Patient declines outpatient CT due to self pay status.   This has been discussed with the patient and they state agreement and understanding.  I offered the patient an ambulance ride and  the patient is declining at this time. The patient is stable upon clinic departure and will go directly to ED without delay.

## 2020-07-15 ENCOUNTER — APPOINTMENT (OUTPATIENT)
Dept: RADIOLOGY | Facility: MEDICAL CENTER | Age: 25
End: 2020-07-15
Attending: EMERGENCY MEDICINE

## 2020-07-15 ENCOUNTER — HOSPITAL ENCOUNTER (OUTPATIENT)
Facility: MEDICAL CENTER | Age: 25
End: 2020-07-17
Attending: EMERGENCY MEDICINE | Admitting: INTERNAL MEDICINE

## 2020-07-15 DIAGNOSIS — R53.1 LEFT-SIDED WEAKNESS: ICD-10-CM

## 2020-07-15 DIAGNOSIS — F41.9 ANXIETY: ICD-10-CM

## 2020-07-15 DIAGNOSIS — G43.409 HEMIPLEGIC MIGRAINE WITHOUT STATUS MIGRAINOSUS, NOT INTRACTABLE: Chronic | ICD-10-CM

## 2020-07-15 DIAGNOSIS — R42 DIZZINESS: ICD-10-CM

## 2020-07-15 LAB
ALBUMIN SERPL BCP-MCNC: 4.8 G/DL (ref 3.2–4.9)
ALBUMIN/GLOB SERPL: 1.4 G/DL
ALP SERPL-CCNC: 74 U/L (ref 30–99)
ALT SERPL-CCNC: 27 U/L (ref 2–50)
ANION GAP SERPL CALC-SCNC: 19 MMOL/L (ref 7–16)
AST SERPL-CCNC: 33 U/L (ref 12–45)
BASOPHILS # BLD AUTO: 0.6 % (ref 0–1.8)
BASOPHILS # BLD: 0.03 K/UL (ref 0–0.12)
BILIRUB SERPL-MCNC: 0.3 MG/DL (ref 0.1–1.5)
BUN SERPL-MCNC: 10 MG/DL (ref 8–22)
CALCIUM SERPL-MCNC: 9.4 MG/DL (ref 8.5–10.5)
CHLORIDE SERPL-SCNC: 99 MMOL/L (ref 96–112)
CO2 SERPL-SCNC: 18 MMOL/L (ref 20–33)
CREAT SERPL-MCNC: 0.62 MG/DL (ref 0.5–1.4)
EOSINOPHIL # BLD AUTO: 0.01 K/UL (ref 0–0.51)
EOSINOPHIL NFR BLD: 0.2 % (ref 0–6.9)
ERYTHROCYTE [DISTWIDTH] IN BLOOD BY AUTOMATED COUNT: 45 FL (ref 35.9–50)
GLOBULIN SER CALC-MCNC: 3.5 G/DL (ref 1.9–3.5)
GLUCOSE SERPL-MCNC: 87 MG/DL (ref 65–99)
HCG SERPL QL: NEGATIVE
HCT VFR BLD AUTO: 60.9 % (ref 37–47)
HGB BLD-MCNC: 19.6 G/DL (ref 12–16)
IMM GRANULOCYTES # BLD AUTO: 0.01 K/UL (ref 0–0.11)
IMM GRANULOCYTES NFR BLD AUTO: 0.2 % (ref 0–0.9)
LYMPHOCYTES # BLD AUTO: 1.58 K/UL (ref 1–4.8)
LYMPHOCYTES NFR BLD: 31.3 % (ref 22–41)
MCH RBC QN AUTO: 26.8 PG (ref 27–33)
MCHC RBC AUTO-ENTMCNC: 32.2 G/DL (ref 33.6–35)
MCV RBC AUTO: 83.4 FL (ref 81.4–97.8)
MONOCYTES # BLD AUTO: 0.18 K/UL (ref 0–0.85)
MONOCYTES NFR BLD AUTO: 3.6 % (ref 0–13.4)
NEUTROPHILS # BLD AUTO: 3.24 K/UL (ref 2–7.15)
NEUTROPHILS NFR BLD: 64.1 % (ref 44–72)
NRBC # BLD AUTO: 0 K/UL
NRBC BLD-RTO: 0 /100 WBC
PLATELET # BLD AUTO: 181 K/UL (ref 164–446)
PMV BLD AUTO: 11.4 FL (ref 9–12.9)
POTASSIUM SERPL-SCNC: 4.1 MMOL/L (ref 3.6–5.5)
PROT SERPL-MCNC: 8.3 G/DL (ref 6–8.2)
RBC # BLD AUTO: 7.3 M/UL (ref 4.2–5.4)
SODIUM SERPL-SCNC: 136 MMOL/L (ref 135–145)
T4 FREE SERPL-MCNC: 1.16 NG/DL (ref 0.93–1.7)
TSH SERPL DL<=0.005 MIU/L-ACNC: 1.33 UIU/ML (ref 0.38–5.33)
WBC # BLD AUTO: 5.1 K/UL (ref 4.8–10.8)

## 2020-07-15 PROCEDURE — 96374 THER/PROPH/DIAG INJ IV PUSH: CPT

## 2020-07-15 PROCEDURE — 94760 N-INVAS EAR/PLS OXIMETRY 1: CPT

## 2020-07-15 PROCEDURE — 99285 EMERGENCY DEPT VISIT HI MDM: CPT

## 2020-07-15 PROCEDURE — 700117 HCHG RX CONTRAST REV CODE 255: Performed by: EMERGENCY MEDICINE

## 2020-07-15 PROCEDURE — A9576 INJ PROHANCE MULTIPACK: HCPCS | Performed by: EMERGENCY MEDICINE

## 2020-07-15 PROCEDURE — 700102 HCHG RX REV CODE 250 W/ 637 OVERRIDE(OP): Performed by: EMERGENCY MEDICINE

## 2020-07-15 PROCEDURE — 84703 CHORIONIC GONADOTROPIN ASSAY: CPT

## 2020-07-15 PROCEDURE — G0378 HOSPITAL OBSERVATION PER HR: HCPCS

## 2020-07-15 PROCEDURE — A9270 NON-COVERED ITEM OR SERVICE: HCPCS | Performed by: EMERGENCY MEDICINE

## 2020-07-15 PROCEDURE — 80053 COMPREHEN METABOLIC PANEL: CPT

## 2020-07-15 PROCEDURE — 72156 MRI NECK SPINE W/O & W/DYE: CPT

## 2020-07-15 PROCEDURE — 85025 COMPLETE CBC W/AUTO DIFF WBC: CPT

## 2020-07-15 PROCEDURE — 700111 HCHG RX REV CODE 636 W/ 250 OVERRIDE (IP): Performed by: EMERGENCY MEDICINE

## 2020-07-15 PROCEDURE — 96375 TX/PRO/DX INJ NEW DRUG ADDON: CPT

## 2020-07-15 PROCEDURE — 700105 HCHG RX REV CODE 258: Performed by: STUDENT IN AN ORGANIZED HEALTH CARE EDUCATION/TRAINING PROGRAM

## 2020-07-15 PROCEDURE — 84443 ASSAY THYROID STIM HORMONE: CPT

## 2020-07-15 PROCEDURE — 70553 MRI BRAIN STEM W/O & W/DYE: CPT

## 2020-07-15 PROCEDURE — 84439 ASSAY OF FREE THYROXINE: CPT

## 2020-07-15 RX ORDER — LORAZEPAM 2 MG/ML
1 INJECTION INTRAMUSCULAR ONCE
Status: COMPLETED | OUTPATIENT
Start: 2020-07-15 | End: 2020-07-15

## 2020-07-15 RX ORDER — ACETAMINOPHEN 500 MG
1000 TABLET ORAL ONCE
Status: COMPLETED | OUTPATIENT
Start: 2020-07-15 | End: 2020-07-15

## 2020-07-15 RX ORDER — HEPARIN SODIUM 5000 [USP'U]/ML
5000 INJECTION, SOLUTION INTRAVENOUS; SUBCUTANEOUS EVERY 8 HOURS
Status: DISCONTINUED | OUTPATIENT
Start: 2020-07-16 | End: 2020-07-17 | Stop reason: HOSPADM

## 2020-07-15 RX ORDER — MIDAZOLAM HYDROCHLORIDE 1 MG/ML
2 INJECTION INTRAMUSCULAR; INTRAVENOUS ONCE
Status: COMPLETED | OUTPATIENT
Start: 2020-07-15 | End: 2020-07-15

## 2020-07-15 RX ORDER — BISACODYL 10 MG
10 SUPPOSITORY, RECTAL RECTAL
Status: DISCONTINUED | OUTPATIENT
Start: 2020-07-15 | End: 2020-07-17 | Stop reason: HOSPADM

## 2020-07-15 RX ORDER — AMOXICILLIN 250 MG
2 CAPSULE ORAL 2 TIMES DAILY
Status: DISCONTINUED | OUTPATIENT
Start: 2020-07-16 | End: 2020-07-17 | Stop reason: HOSPADM

## 2020-07-15 RX ORDER — ACETAMINOPHEN 325 MG/1
650 TABLET ORAL EVERY 6 HOURS PRN
Status: DISCONTINUED | OUTPATIENT
Start: 2020-07-15 | End: 2020-07-16

## 2020-07-15 RX ORDER — LABETALOL HYDROCHLORIDE 5 MG/ML
10 INJECTION, SOLUTION INTRAVENOUS EVERY 4 HOURS PRN
Status: DISCONTINUED | OUTPATIENT
Start: 2020-07-15 | End: 2020-07-17 | Stop reason: HOSPADM

## 2020-07-15 RX ORDER — POLYETHYLENE GLYCOL 3350 17 G/17G
1 POWDER, FOR SOLUTION ORAL
Status: DISCONTINUED | OUTPATIENT
Start: 2020-07-15 | End: 2020-07-17 | Stop reason: HOSPADM

## 2020-07-15 RX ORDER — SODIUM CHLORIDE, SODIUM LACTATE, POTASSIUM CHLORIDE, CALCIUM CHLORIDE 600; 310; 30; 20 MG/100ML; MG/100ML; MG/100ML; MG/100ML
1000 INJECTION, SOLUTION INTRAVENOUS ONCE
Status: COMPLETED | OUTPATIENT
Start: 2020-07-15 | End: 2020-07-16

## 2020-07-15 RX ADMIN — LORAZEPAM 1 MG: 2 INJECTION INTRAMUSCULAR; INTRAVENOUS at 19:00

## 2020-07-15 RX ADMIN — GADOTERIDOL 15 ML: 279.3 INJECTION, SOLUTION INTRAVENOUS at 21:21

## 2020-07-15 RX ADMIN — SODIUM CHLORIDE, POTASSIUM CHLORIDE, SODIUM LACTATE AND CALCIUM CHLORIDE 1000 ML: 600; 310; 30; 20 INJECTION, SOLUTION INTRAVENOUS at 23:25

## 2020-07-15 RX ADMIN — MIDAZOLAM HYDROCHLORIDE 2 MG: 1 INJECTION, SOLUTION INTRAMUSCULAR; INTRAVENOUS at 20:13

## 2020-07-15 RX ADMIN — ACETAMINOPHEN 1000 MG: 500 TABLET ORAL at 22:17

## 2020-07-15 ASSESSMENT — FIBROSIS 4 INDEX
FIB4 SCORE: 0.35
FIB4 SCORE: 0.91

## 2020-07-15 NOTE — ED TRIAGE NOTES
Chief Complaint   Patient presents with   • N/V     X 1wk    pt states multiple complaints in triage, overall states not feeling better, pt states seeing PMD for neurologically issues. Pt is PWD NAD in triage she has steady gait. Pt states has had MRI. She is wearing mask and aware of triage process.

## 2020-07-16 PROBLEM — R11.2 INTRACTABLE VOMITING WITH NAUSEA: Chronic | Status: ACTIVE | Noted: 2020-07-16

## 2020-07-16 PROBLEM — O20.0 THREATENED SPONTANEOUS ABORTION: Status: RESOLVED | Noted: 2018-09-10 | Resolved: 2020-07-16

## 2020-07-16 PROBLEM — R53.1 WEAKNESS: Chronic | Status: ACTIVE | Noted: 2020-07-16

## 2020-07-16 PROBLEM — R53.83 LETHARGY: Chronic | Status: ACTIVE | Noted: 2020-07-16

## 2020-07-16 PROBLEM — O20.8 SUBCHORIONIC HEMORRHAGE IN FIRST TRIMESTER: Status: RESOLVED | Noted: 2018-09-10 | Resolved: 2020-07-16

## 2020-07-16 PROBLEM — R20.0 LOSS OF SENSATION: Status: ACTIVE | Noted: 2020-07-16

## 2020-07-16 PROBLEM — G40.909 SEIZURE DISORDER (HCC): Chronic | Status: ACTIVE | Noted: 2020-07-16

## 2020-07-16 PROBLEM — O46.8X1 SUBCHORIONIC HEMORRHAGE IN FIRST TRIMESTER: Status: RESOLVED | Noted: 2018-09-10 | Resolved: 2020-07-16

## 2020-07-16 PROBLEM — G43.909 MIGRAINE: Chronic | Status: ACTIVE | Noted: 2020-07-16

## 2020-07-16 PROBLEM — O41.8X10 SUBCHORIONIC HEMORRHAGE IN FIRST TRIMESTER: Status: RESOLVED | Noted: 2018-09-10 | Resolved: 2020-07-16

## 2020-07-16 LAB
ALBUMIN SERPL BCP-MCNC: 4.1 G/DL (ref 3.2–4.9)
ALBUMIN/GLOB SERPL: 1.3 G/DL
ALP SERPL-CCNC: 65 U/L (ref 30–99)
ALT SERPL-CCNC: 20 U/L (ref 2–50)
ANION GAP SERPL CALC-SCNC: 13 MMOL/L (ref 7–16)
AST SERPL-CCNC: 18 U/L (ref 12–45)
BASOPHILS # BLD AUTO: 0.2 % (ref 0–1.8)
BASOPHILS # BLD: 0.01 K/UL (ref 0–0.12)
BILIRUB SERPL-MCNC: 0.4 MG/DL (ref 0.1–1.5)
BUN SERPL-MCNC: 8 MG/DL (ref 8–22)
CALCIUM SERPL-MCNC: 9 MG/DL (ref 8.5–10.5)
CHLORIDE SERPL-SCNC: 102 MMOL/L (ref 96–112)
CHOLEST SERPL-MCNC: 195 MG/DL (ref 100–199)
CO2 SERPL-SCNC: 22 MMOL/L (ref 20–33)
CREAT SERPL-MCNC: 0.54 MG/DL (ref 0.5–1.4)
EOSINOPHIL # BLD AUTO: 0.03 K/UL (ref 0–0.51)
EOSINOPHIL NFR BLD: 0.6 % (ref 0–6.9)
ERYTHROCYTE [DISTWIDTH] IN BLOOD BY AUTOMATED COUNT: 45.1 FL (ref 35.9–50)
GLOBULIN SER CALC-MCNC: 3.1 G/DL (ref 1.9–3.5)
GLUCOSE SERPL-MCNC: 84 MG/DL (ref 65–99)
HCT VFR BLD AUTO: 36.4 % (ref 37–47)
HDLC SERPL-MCNC: 43 MG/DL
HGB BLD-MCNC: 11.5 G/DL (ref 12–16)
IMM GRANULOCYTES # BLD AUTO: 0.01 K/UL (ref 0–0.11)
IMM GRANULOCYTES NFR BLD AUTO: 0.2 % (ref 0–0.9)
LDLC SERPL CALC-MCNC: 133 MG/DL
LYMPHOCYTES # BLD AUTO: 2.63 K/UL (ref 1–4.8)
LYMPHOCYTES NFR BLD: 49.7 % (ref 22–41)
MAGNESIUM SERPL-MCNC: 2.3 MG/DL (ref 1.5–2.5)
MCH RBC QN AUTO: 26.9 PG (ref 27–33)
MCHC RBC AUTO-ENTMCNC: 31.8 G/DL (ref 33.6–35)
MCV RBC AUTO: 84.6 FL (ref 81.4–97.8)
MONOCYTES # BLD AUTO: 0.39 K/UL (ref 0–0.85)
MONOCYTES NFR BLD AUTO: 7.4 % (ref 0–13.4)
NEUTROPHILS # BLD AUTO: 2.22 K/UL (ref 2–7.15)
NEUTROPHILS NFR BLD: 41.9 % (ref 44–72)
NRBC # BLD AUTO: 0 K/UL
NRBC BLD-RTO: 0 /100 WBC
PLATELET # BLD AUTO: 298 K/UL (ref 164–446)
PMV BLD AUTO: 11.3 FL (ref 9–12.9)
POTASSIUM SERPL-SCNC: 3.7 MMOL/L (ref 3.6–5.5)
PROT SERPL-MCNC: 7.2 G/DL (ref 6–8.2)
RBC # BLD AUTO: 4.28 M/UL (ref 4.2–5.4)
SODIUM SERPL-SCNC: 137 MMOL/L (ref 135–145)
TRIGL SERPL-MCNC: 95 MG/DL (ref 0–149)
WBC # BLD AUTO: 5.3 K/UL (ref 4.8–10.8)

## 2020-07-16 PROCEDURE — 99245 OFF/OP CONSLTJ NEW/EST HI 55: CPT | Performed by: PSYCHIATRY & NEUROLOGY

## 2020-07-16 PROCEDURE — 700102 HCHG RX REV CODE 250 W/ 637 OVERRIDE(OP): Performed by: INTERNAL MEDICINE

## 2020-07-16 PROCEDURE — 700102 HCHG RX REV CODE 250 W/ 637 OVERRIDE(OP): Performed by: STUDENT IN AN ORGANIZED HEALTH CARE EDUCATION/TRAINING PROGRAM

## 2020-07-16 PROCEDURE — A9270 NON-COVERED ITEM OR SERVICE: HCPCS | Performed by: STUDENT IN AN ORGANIZED HEALTH CARE EDUCATION/TRAINING PROGRAM

## 2020-07-16 PROCEDURE — 99220 PR INITIAL OBSERVATION CARE,LEVL III: CPT | Mod: GC | Performed by: INTERNAL MEDICINE

## 2020-07-16 PROCEDURE — 83735 ASSAY OF MAGNESIUM: CPT

## 2020-07-16 PROCEDURE — 95816 EEG AWAKE AND DROWSY: CPT | Performed by: PSYCHIATRY & NEUROLOGY

## 2020-07-16 PROCEDURE — 80053 COMPREHEN METABOLIC PANEL: CPT

## 2020-07-16 PROCEDURE — A9270 NON-COVERED ITEM OR SERVICE: HCPCS | Performed by: INTERNAL MEDICINE

## 2020-07-16 PROCEDURE — G0378 HOSPITAL OBSERVATION PER HR: HCPCS

## 2020-07-16 PROCEDURE — 85025 COMPLETE CBC W/AUTO DIFF WBC: CPT

## 2020-07-16 PROCEDURE — 36415 COLL VENOUS BLD VENIPUNCTURE: CPT

## 2020-07-16 PROCEDURE — 80061 LIPID PANEL: CPT

## 2020-07-16 PROCEDURE — 95816 EEG AWAKE AND DROWSY: CPT | Mod: 26 | Performed by: PSYCHIATRY & NEUROLOGY

## 2020-07-16 RX ORDER — CLONAZEPAM 1 MG/1
2 TABLET ORAL NIGHTLY PRN
Status: DISCONTINUED | OUTPATIENT
Start: 2020-07-16 | End: 2020-07-17 | Stop reason: HOSPADM

## 2020-07-16 RX ORDER — IPRATROPIUM BROMIDE AND ALBUTEROL SULFATE 2.5; .5 MG/3ML; MG/3ML
3 SOLUTION RESPIRATORY (INHALATION) 4 TIMES DAILY
Status: DISCONTINUED | OUTPATIENT
Start: 2020-07-16 | End: 2020-07-17 | Stop reason: HOSPADM

## 2020-07-16 RX ORDER — POTASSIUM CHLORIDE 20 MEQ/1
40 TABLET, EXTENDED RELEASE ORAL ONCE
Status: COMPLETED | OUTPATIENT
Start: 2020-07-16 | End: 2020-07-16

## 2020-07-16 RX ORDER — BUTALBITAL, ACETAMINOPHEN AND CAFFEINE 50; 325; 40 MG/1; MG/1; MG/1
1 TABLET ORAL EVERY 6 HOURS PRN
Status: DISCONTINUED | OUTPATIENT
Start: 2020-07-16 | End: 2020-07-17 | Stop reason: HOSPADM

## 2020-07-16 RX ADMIN — ACETAMINOPHEN 650 MG: 325 TABLET, FILM COATED ORAL at 08:12

## 2020-07-16 RX ADMIN — ACETAMINOPHEN 650 MG: 325 TABLET, FILM COATED ORAL at 16:31

## 2020-07-16 RX ADMIN — CLONAZEPAM 2 MG: 1 TABLET ORAL at 20:27

## 2020-07-16 RX ADMIN — POTASSIUM CHLORIDE 40 MEQ: 1500 TABLET, EXTENDED RELEASE ORAL at 08:13

## 2020-07-16 RX ADMIN — CLONAZEPAM 2 MG: 1 TABLET ORAL at 00:49

## 2020-07-16 ASSESSMENT — LIFESTYLE VARIABLES
TOTAL SCORE: 0
HAVE YOU EVER FELT YOU SHOULD CUT DOWN ON YOUR DRINKING: NO
ON A TYPICAL DAY WHEN YOU DRINK ALCOHOL HOW MANY DRINKS DO YOU HAVE: 0
TOTAL SCORE: 0
SUBSTANCE_ABUSE: 0
AVERAGE NUMBER OF DAYS PER WEEK YOU HAVE A DRINK CONTAINING ALCOHOL: 0
EVER HAD A DRINK FIRST THING IN THE MORNING TO STEADY YOUR NERVES TO GET RID OF A HANGOVER: NO
HOW MANY TIMES IN THE PAST YEAR HAVE YOU HAD 5 OR MORE DRINKS IN A DAY: 0
CONSUMPTION TOTAL: NEGATIVE
DOES PATIENT WANT TO STOP DRINKING: NO
HAVE PEOPLE ANNOYED YOU BY CRITICIZING YOUR DRINKING: NO
EVER_SMOKED: YES
TOTAL SCORE: 0
EVER_SMOKED: YES
ALCOHOL_USE: NO
EVER FELT BAD OR GUILTY ABOUT YOUR DRINKING: NO

## 2020-07-16 ASSESSMENT — ENCOUNTER SYMPTOMS
VOMITING: 0
EYE PAIN: 0
CHILLS: 0
DIZZINESS: 1
WHEEZING: 0
CONSTIPATION: 0
NERVOUS/ANXIOUS: 1
DEPRESSION: 0
LOSS OF CONSCIOUSNESS: 1
COUGH: 0
SEIZURES: 1
NAUSEA: 1
VOMITING: 1
DOUBLE VISION: 1
POLYDIPSIA: 0
DIAPHORESIS: 0
NECK PAIN: 1
TREMORS: 1
MYALGIAS: 1
DOUBLE VISION: 0
SPUTUM PRODUCTION: 0
FOCAL WEAKNESS: 0
TINGLING: 1
BLURRED VISION: 1
SPEECH CHANGE: 0
NAUSEA: 0
EYE PAIN: 1
WEIGHT LOSS: 0
HEADACHES: 1
PALPITATIONS: 0
BACK PAIN: 0
FOCAL WEAKNESS: 1
SENSORY CHANGE: 1
NERVOUS/ANXIOUS: 0
NECK PAIN: 0
PHOTOPHOBIA: 1
ABDOMINAL PAIN: 0
HALLUCINATIONS: 0
FEVER: 0
TREMORS: 0
WEAKNESS: 1
BLURRED VISION: 0
HEADACHES: 0
MEMORY LOSS: 0
BACK PAIN: 1
SHORTNESS OF BREATH: 0
INSOMNIA: 0
SORE THROAT: 0
TINGLING: 0
DIARRHEA: 0
BRUISES/BLEEDS EASILY: 0

## 2020-07-16 ASSESSMENT — FIBROSIS 4 INDEX: FIB4 SCORE: 0.35

## 2020-07-16 ASSESSMENT — PATIENT HEALTH QUESTIONNAIRE - PHQ9
2. FEELING DOWN, DEPRESSED, IRRITABLE, OR HOPELESS: NOT AT ALL
1. LITTLE INTEREST OR PLEASURE IN DOING THINGS: NOT AT ALL
SUM OF ALL RESPONSES TO PHQ9 QUESTIONS 1 AND 2: 0

## 2020-07-16 NOTE — ASSESSMENT & PLAN NOTE
-Pt reports years of loss of sensation  -Sensation loss is primarily on L side of body (upper and lower extremities) but is also present on R side  -CN exam showed abnormal V2 and V3 exam on left side of face    -Neurology consulted from ED, will see pt in AM (Dr. Lehman) appreciate recs  -F/u on MRI cervical spine and MRI brain  -Continue to monitor

## 2020-07-16 NOTE — ASSESSMENT & PLAN NOTE
Patient reports holocephalic headaches with occular symptoms, ongoing for several years.   Currently not experiencing a headache  MRI brain WNL    Plan  - Tylenol 650 mg PRN  - Cold compresses PRN  - Can add sumatriptan if needed  - Appreciate Neurology recommendations  -Continue to monitor

## 2020-07-16 NOTE — ED TRIAGE NOTES
"Patient vital signs rechecked and documented per [facility]. Patient denies any new needs at this time.  Patient updated on wait times, thanked for patience. Pt informed to alert triage tech or triage RN with any needs and/or changes in condition; patient verbalized understanding.     Patient stated \"my pain is just all over, its about the same\".   "

## 2020-07-16 NOTE — ED NOTES
Report given to receiving RNClair, all questions answered. RN transported pt to floor via [employer] on monitor. All belongings with pt

## 2020-07-16 NOTE — H&P
"History & Physical Note    Date of Admission: 7/16/2020  Admission Status: Observation-Outpatient  [facility] Team: [facility]  Yellow Team  Attending: LIZBETH Merritt  Senior Resident: Dr. Valdez  Contact Number: [phone]    Chief Complaint: Nausea/vomiting    History of Present Illness (HPI):     Sadia is a 24 y.o. female who presented 7/15/2020 with 1 week of nausea and vomiting. Associated symptoms include tremors in her extremities, muscle spasms, blurry vision, weakness in her legs to the point that they \"give out\", and general lethargy.    Briefly, patient was in usual state of health till 3 years ago, when she started having perfuse lethargy that was not alleviated by anti-depressant use, exercise, and herbs/vitamins (specifically, vitamin B6). Two years ago, pt started having seizures, with types ranging from simple partials to grand mals. Pt describes having flu-like symptoms as before starting to have body shaking and tremors, resulting in eventual collapses and loss of consciousness. Pt underwent numerous tests for autoimmune diseases such as lupus and rheumatoid arthritis but all came back negative. Three months ago, pt underwent MRI which she says looked abnormal but was still interpreted as normal by radiologist. One month ago, she started having swelling and pain in her R ear, for which she went to the doctor's office. Doctor stated that he may have seen spinal fluid build-up in R ear and referred her to obtain CT at [facility]; pt was never able to obtain CT and decided to come to [facility] for further work-up.    Currently, pt cites exquisite pain across her entire body upon any level of touch. She describes the pain as a pins and needles sensation that makes her nauseous. She also states she has throbbing headache across her entire head area which she describes as a \"pulsating\" sensation. She states that the headache improves with turning the light off, but that lately turning the light " "off has not alleviated the pain. Pt states the headache has gotten so bad that she now sleeps on her side or propped up to not put pressure on her head.    Pt cites weakness over the left side of her body. She states she cannot lift her L leg properly and also has little sensation in her left leg and left arm. She states she cannot lift anything with her left arm. She states she has more sensation on her R leg and R arm but still feels weak compared to before.    Pt states that she feels her face has less movement than before; she states her upper lip and check feel as those they are \"fast asleep\", causing her to not be able to smile.    With all of these symptoms, patient states that she has developed a food aversion to the point where she is constantly throwing up food. She is able to tolerate only water. Her last meal was yesterday; she has not had any food today.      Review of Systems:   Review of Systems   Constitutional: Positive for malaise/fatigue. Negative for chills, fever and weight loss.   HENT: Positive for ear pain. Negative for congestion, hearing loss, nosebleeds and sore throat.    Eyes: Positive for blurred vision, double vision, photophobia and pain.   Respiratory: Negative for cough, sputum production, shortness of breath and wheezing.    Cardiovascular: Negative for chest pain, palpitations and leg swelling.   Gastrointestinal: Positive for nausea and vomiting. Negative for abdominal pain, constipation and diarrhea.   Genitourinary: Negative for dysuria, frequency and urgency.   Musculoskeletal: Positive for back pain, myalgias and neck pain.   Skin: Negative for itching and rash.   Neurological: Positive for dizziness, tingling, tremors, sensory change, seizures, loss of consciousness, weakness and headaches. Negative for speech change and focal weakness.   Psychiatric/Behavioral: Negative for depression, hallucinations, substance abuse and suicidal ideas. The patient is nervous/anxious.       "   Hypersomnia       .  Past Medical History:   Past Medical History was reviewed with patient.   has a past medical history of Anxiety, Seizure disorder (HCC), and Seizures (HCC).    Past Surgical History: Past Surgical History was reviewed with patient.  Appendectomy in 2017 for appendicitis    Medications: Medications have been reviewed with patient.  Prior to Admission Medications   Prescriptions Last Dose Informant Patient Reported? Taking?   clonazepam (KLONOPIN) 2 MG tablet  Patient Yes No   Sig: Take 2 mg by mouth at bedtime as needed.   ipratropium-albuterol (DUONEB) 0.5-2.5 (3) MG/3ML nebulizer solution   Yes No   Sig: 3 mL by Nebulization route 4 times a day.      Facility-Administered Medications: None        Allergies: Allergies have been reviewed with patient.  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 from [facility]  Other (stressors, spirituality, exposures):  None  Physical Exam:     Vitals:  Temp:  [36.2 °C (97.2 °F)-37.4 °C (99.3 °F)] 36.9 °C (98.4 °F)  Pulse:  [] 64  Resp:  [14-24] 18  BP: ()/(56-81) 100/64  SpO2:  [90 %-99 %] 97 %    Physical Exam  Vitals signs and nursing note reviewed.   Constitutional:       General: She is not in acute distress.     Appearance: Normal appearance. She is not ill-appearing.   HENT:      Head: Normocephalic and atraumatic.      Right Ear: External ear normal.      Left  Ear: External ear normal.      Nose: Nose normal. No congestion or rhinorrhea.      Mouth/Throat:      Mouth: Mucous membranes are dry.      Pharynx: Oropharynx is clear. No oropharyngeal exudate or posterior oropharyngeal erythema.   Eyes:      Extraocular Movements: Extraocular movements intact.      Conjunctiva/sclera: Conjunctivae normal.      Pupils: Pupils are equal, round, and reactive to light.   Neck:      Musculoskeletal: Normal range of motion and neck supple. No neck rigidity or muscular tenderness.   Cardiovascular:      Rate and Rhythm: Normal rate and regular rhythm.      Pulses: Normal pulses.   Pulmonary:      Effort: Pulmonary effort is normal. No respiratory distress.      Breath sounds: Normal breath sounds. No wheezing or rales.   Abdominal:      General: Abdomen is flat. Bowel sounds are normal. There is no distension.      Palpations: Abdomen is soft.      Tenderness: There is no abdominal tenderness. There is no guarding.   Musculoskeletal: Normal range of motion.         General: No swelling, deformity or signs of injury.      Right lower leg: No edema.      Left lower leg: No edema.   Skin:     General: Skin is warm and dry.      Coloration: Skin is not jaundiced.      Findings: No bruising.   Neurological:      General: No focal deficit present.      Mental Status: She is oriented to person, place, and time. Mental status is at baseline. She is lethargic.      Cranial Nerves: Cranial nerve deficit present. No dysarthria or facial asymmetry.      Sensory: Sensory deficit present.      Motor: Weakness present. No tremor, atrophy, abnormal muscle tone or seizure activity.      Coordination: Coordination is intact. Coordination normal.      Deep Tendon Reflexes: Reflexes abnormal.      Reflex Scores:       Patellar reflexes are 1+ on the right side and 1+ on the left side.     Comments: CN: V2, V3 sensation diminished on L side of face    Sensory: Decreased sensation on L leg and L  arm    Motor: Weakness on plantarflexion and dorsiflexion of L foot. Weakness on flexion and extension of L leg.    Did not assess gait due to concern for fall         Labs:   Results for JONATHAN DICKERSON (MRN [id number]) as of 7/16/2020 05:51   Ref. Range 7/15/2020 17:52   WBC Latest Ref Range: 4.8 - 10.8 K/uL 5.1   RBC Latest Ref Range: 4.20 - 5.40 M/uL 7.30 (H)   Hemoglobin Latest Ref Range: 12.0 - 16.0 g/dL 19.6 (H)   Hematocrit Latest Ref Range: 37.0 - 47.0 % 60.9 (H)   MCV Latest Ref Range: 81.4 - 97.8 fL 83.4   MCH Latest Ref Range: 27.0 - 33.0 pg 26.8 (L)   MCHC Latest Ref Range: 33.6 - 35.0 g/dL 32.2 (L)   RDW Latest Ref Range: 35.9 - 50.0 fL 45.0   Platelet Count Latest Ref Range: 164 - 446 K/uL 181   MPV Latest Ref Range: 9.0 - 12.9 fL 11.4   Neutrophils-Polys Latest Ref Range: 44.00 - 72.00 % 64.10   Neutrophils (Absolute) Latest Ref Range: 2.00 - 7.15 K/uL 3.24   Lymphocytes Latest Ref Range: 22.00 - 41.00 % 31.30   Lymphs (Absolute) Latest Ref Range: 1.00 - 4.80 K/uL 1.58   Monocytes Latest Ref Range: 0.00 - 13.40 % 3.60   Monos (Absolute) Latest Ref Range: 0.00 - 0.85 K/uL 0.18   Eosinophils Latest Ref Range: 0.00 - 6.90 % 0.20   Eos (Absolute) Latest Ref Range: 0.00 - 0.51 K/uL 0.01   Basophils Latest Ref Range: 0.00 - 1.80 % 0.60   Baso (Absolute) Latest Ref Range: 0.00 - 0.12 K/uL 0.03   Immature Granulocytes Latest Ref Range: 0.00 - 0.90 % 0.20   Immature Granulocytes (abs) Latest Ref Range: 0.00 - 0.11 K/uL 0.01   Nucleated RBC Latest Units: /100 WBC 0.00   NRBC (Absolute) Latest Units: K/uL 0.00   Sodium Latest Ref Range: 135 - 145 mmol/L 136   Potassium Latest Ref Range: 3.6 - 5.5 mmol/L 4.1   Chloride Latest Ref Range: 96 - 112 mmol/L 99   Co2 Latest Ref Range: 20 - 33 mmol/L 18 (L)   Anion Gap Latest Ref Range: 7.0 - 16.0  19.0 (H)   Glucose Latest Ref Range: 65 - 99 mg/dL 87   Bun Latest Ref Range: 8 - 22 mg/dL 10   Creatinine Latest Ref Range: 0.50 - 1.40 mg/dL 0.62   GFR If African American  Latest Ref Range: >60 mL/min/1.73 m 2 >60   GFR If Non  Latest Ref Range: >60 mL/min/1.73 m 2 >60   Calcium Latest Ref Range: 8.5 - 10.5 mg/dL 9.4   AST(SGOT) Latest Ref Range: 12 - 45 U/L 33   ALT(SGPT) Latest Ref Range: 2 - 50 U/L 27   Alkaline Phosphatase Latest Ref Range: 30 - 99 U/L 74   Total Bilirubin Latest Ref Range: 0.1 - 1.5 mg/dL 0.3   Albumin Latest Ref Range: 3.2 - 4.9 g/dL 4.8   Total Protein Latest Ref Range: 6.0 - 8.2 g/dL 8.3 (H)   Globulin Latest Ref Range: 1.9 - 3.5 g/dL 3.5   A-G Ratio Latest Units: g/dL 1.4   TSH Latest Ref Range: 0.380 - 5.330 uIU/mL 1.330   Free T-4 Latest Ref Range: 0.93 - 1.70 ng/dL 1.16   Beta-Hcg Qualitative Serum Latest Ref Range: Negative  Negative       Imaging:   MRI brain: awaiting results  MRI spine: awaiting results    Previous Data Review: reviewed    * Intractable vomiting with nausea- (present on admission)  Assessment & Plan  -Pt reports 1 week of nausea and vomiting that had caused her to not be able to eat  -May be related to chronic migraines  -Pt has not had episodes of N/V since admission    -Currently NPO  -Consult Neurology, appreciate recs  -Consider PRN Zofran or PRN phenergan if pt develops nausea    Migraine- (present on admission)  Assessment & Plan  -Pt has h/o migraines for many years  -Pt has photophobia  -Migraines have been alleviated with turning off lights, Ibuprofen, Tylenol, and cold compresses  -Pt reports that recently migraines have become more intractable, not alleviated by turning off lights    -Pt had migraine overnight that was not alleviated by PRN tylenol 650 mg  -Migraines were alleviated by cold compresses  -Cold compresses PRN  -Consider sumatriptan if needed  -F/u on MRI cervical spine and MRI brain  -Neurology consulted from ED, will see pt in AM (Dr. Lehman) appreciate recs  -Continue to monitor    Weakness- (present on admission)  Assessment & Plan  -Pt has h/o 3 years of weakness  -Weakness is worse on L  "side of body (arm and leg) compared to right  -Weakness has prevented her from being able to work and has caused pt to become hypersomnolent  -Neurological exam abnl - specifically with sensory and motor deficits on her L arm and L leg    -Neurology consulted from ED, will see pt in AM (Dr. Lehman) appreciate recs  -F/u on MRI cervical spine and MRI brain  -Fall precautions in place  -Continue to monitor    Loss of sensation- (present on admission)  Assessment & Plan  -Pt reports years of loss of sensation  -Sensation loss is primarily on L side of body (upper and lower extremities) but is also present on R side  -CN exam showed abnormal V2 and V3 exam on left side of face    -Neurology consulted from ED, will see pt in AM (Dr. Lehman) appreciate recs  -F/u on MRI cervical spine and MRI brain  -Continue to monitor    Lethargy- (present on admission)  Assessment & Plan  -Pt reports 3 years of lethargy that has not been improved by exercise, anti-depressants, vitamins, etc  -Pt has been hypersomnolent for 3 years  -Has caused pt to not be able to work    -Neurology consulted from ED, will see pt in AM (Dr. Lehman) appreciate recs  -Continue to monitor    Seizure disorder (HCC)- (present on admission)  Assessment & Plan  -Pt reports h/o seizures that range from simple partials to grand mals  -Pt reports prodrome symptoms as flu-like symptoms and sensations  -Pt reports has not have seizures in \"awhile\"  -On Klonopin at home for preventing seizures  -Felt prodrome symptoms in hospital and asked for Klonopin    Plan:  -Continue on Klonopin  -Consider EEG for further w/u  -Neurology consulted from ED, will see pt in AM (Dr. Lehman) appreciate recs  -F/u on MRI cervical spine and MRI brain  -Continue to monitor     Quality Measures:    Feeding: NPO  Fall precautions: Ambulate with assistance, fall precautions in place  Thromboprophylaxis: IV heparin, SCDs  Ulcer prophylaxis: none  Bowel care: bowel regimen " prn  Indwelling lines:PIV  Antibiotics: None      Renu Reyes MD, MPH  [facility], PGY-1

## 2020-07-16 NOTE — ED NOTES
Report from SIDDHARTH Temple assuming pt care.    Neuro assessment performed. Pt sates she has a hx of tingling and weakness all over, but states she has more weakness and tingling in her left. Side. Pt is unable to flex the left side of her face when asked to smile, frown or move her eyebrows, but is able to speak clearly with no loss of speech. Pt states she first noticed neuro deficits approx 3 years ago, but that it has gotten worse over the past few months when she first got an MRI.    Pt resting in bed. Call light in reach and side rails up. Pt states she feels slightly more calm after Ativan administration, but that she may need something else for the MRI due to claustrophobia. ERP notified.

## 2020-07-16 NOTE — PROGRESS NOTES
Bedside report received from ER nurse. Assumed care of patient. Pt. resting comfortably without any sign of distress. A&Ox4, VSS, no complaints at this time. POC reviewed and white board updated, Tele box on, Monitor room notified, Call light in reach, Bed locked in lowest position.

## 2020-07-16 NOTE — NON-PROVIDER
"    Medical Student Admitting History and Physical  Note Author: Celina Cha, Student    Name Sadia Troncoso 1995   Age/Sex 24 y.o. female   MRN [id number]   Code Status Full        Chief Complaint: \"trembling like my body was trying to have a seizure\"    ID:   Sadia Troncoso is a 24 year old female with a history of seizures on day 1 of hospitalization for dizziness and left-sided weakness.     HPI:   For 3 years the patient has had intermittent, worsening fatigue and generalized weakness. 2-3 months ago she had a seizure and was evaluated in the [facility] ([facility]) ED, where she was given Clonazepam and instructed to follow up with Neurology. She did not have insurance at the time, so she was not able to follow up for the seizure. She describes her seizures as starting in her left fingertips and hand as a \"numbness\" and spreading to her right hand and feet. She describes writhing motions of her fingers and spreading of her fingers/toes before the rest of her body stiffens and she loses consciousness. When her seizures start, she hears ringing in her ears. Denies noticing a taste or smell. Per the patient, her fiancé has noticed her shaking in her sleep. 3 times in the last month, she has had nocturnal enuresis but has never had it before that. She denies daytime urinary incontinence. She has not been incontinent of stool.     1 month ago, she developed right ear pain and right mastoid edema. She was seen at an urgent care where she was told that it was concerning for CSF. She then went to the [facility] ED where she had an MRI of her brain. She has images of the MRI on her phone today. The initial MRI was interpreted by a neurologist who thought the findings were WNL. She then showed the MRI to family friends who are neurologists, neurosurgeons, and MRI techs that thought it was an abnormal MRI.  The patient was planning to wait to see a neurologist until after getting , because " "then she would be under her ’s insurance plan. However, she became concerned when her symptoms began worsening a week ago.     1 week ago, the patient’s fatigue worsened to be \"almost flu-like\". Around the same time, she developed associated nausea, dizziness, headache, photophobia, and hyperesthesia. A few days ago (4-5 days) she began vomiting and has been vomiting after every meal. Yesterday she developed paresthesia throughout her entire body which she describes as a \"tingling, pins and needles\" sensation. This was associated with muscle spasms, mainly on the left side of her body, and trembling. This is what ultimately caused her to go to the ED.     ED COURSE:   Noted to have left sided weakness. Given 1 mg of Ativan for the muscle spasms. H/H of 19./60.9. Anion gap of 19. HCO3- of 18. Negative beta-HCG. TSH WNL.     O/N:   No acute overnight events. The patient felt like she would have a seizure last night and was given - of Clonazepam. She did not have a seizure last night. The patient feels improved this morning. Her nausea and headache have resolved. She endorses persistent left sided weakness but adds that this has been the case for months.     Review of Systems   Constitutional: Positive for malaise/fatigue. Negative for chills, diaphoresis and fever.   HENT: Positive for ear discharge, ear pain and tinnitus.    Eyes: Positive for blurred vision and photophobia. Negative for double vision.   Respiratory: Negative for cough and shortness of breath.    Cardiovascular: Negative for chest pain, palpitations and leg swelling.   Gastrointestinal: Positive for nausea and vomiting.        Pelvic/lower abdominal pain for a week    Genitourinary:        + incontinence at night X 1 month   + urgency with occasional dribbling during the day X 1 month   Denies dysuria.    Musculoskeletal: Negative for back pain.   Skin: Negative for rash.   Neurological: Positive for dizziness, tingling, tremors, sensory " "change, focal weakness, seizures, weakness and headaches.   Endo/Heme/Allergies: Does not bruise/bleed easily.   Psychiatric/Behavioral:        She reports some situational depression and anxiety related to not knowing the reason for her symptoms       Past Medical History (chronic problems, known complications, current management)     Seizures   Reported history of migraines (not following diagnosed)   - Reports the following symptoms when she feels she is having a migraine: throbbing, generalized head pain with associated photophobia and nausea.     Past Surgical History:  - Appy 3 years ago (23-22 years old)    Medication Allergy/Sensitivities:   vitamin B6 supplements, multivitamins, Zeal energy shakes, increased caffeine, and increased sleep without noted improvement.     Family History:  Aunt: multiple sclerosis  Grandmother: Brain tumor     Social History:  Smoking: Quit smoking cigarettes 3 days  Alcohol: Denies  Illictis: Denies  Other: Works as a professional [employer]  Living situation: Lives at home with winnie and two children, who are 15 months and 5 years old       Physical Exam     Vitals:    07/15/20 2159 07/15/20 2229 07/15/20 2330 07/16/20 0400   BP: (!) 97/65 105/69 101/64 100/64   Pulse: 95 94 86 64   Resp: 18 16 16 18   Temp:   36.2 °C (97.2 °F) 36.9 °C (98.4 °F)   TempSrc:   Temporal Temporal   SpO2: 99% 98% 90% 97%   Weight:   68.7 kg (151 lb 7.3 oz)    Height:         Body mass index is 28.62 kg/m².  /64   Pulse 64   Temp 36.9 °C (98.4 °F) (Temporal)   Resp 18   Ht 1.549 m (5' 1\")   Wt 68.7 kg (151 lb 7.3 oz)   LMP 06/28/2020   SpO2 97%   BMI 28.62 kg/m²   O2 therapy: Pulse Oximetry: 97 %, O2 Delivery Device: None - Room Air    Physical Exam   Constitutional: She is oriented to person, place, and time. No distress.   HENT:   Head: Normocephalic and atraumatic.   Eyes: EOM are normal. No scleral icterus.   Neck: Neck supple.   Cardiovascular: Normal rate and regular rhythm. "   No murmur heard.  Pulmonary/Chest: Effort normal and breath sounds normal. No respiratory distress. She has no wheezes. She has no rales.   Abdominal: Soft. Bowel sounds are normal. She exhibits no distension and no mass. There is no abdominal tenderness. There is no rebound and no guarding.   Musculoskeletal: Normal range of motion.   Neurological: She is alert and oriented to person, place, and time.   CN II-VI, VIII-XII intact. CN VII: Decreased right nasolabial fold.    Strength:   - 5/5 strength to right upper (C5,6,7,8 and T1) and lower extremities (L2,3,4,5 and S1)  - 5/5 strength to knee flexion and extension. Otherwise, 4/5 strength in left upper extremity and left lower extremity.    Skin: Skin is warm and dry. She is not diaphoretic.   Psychiatric:   Anxious appearing       Recent Results (from the past 168 hour(s))   CBC WITH DIFFERENTIAL    Collection Time: 07/15/20  5:52 PM   Result Value Ref Range    WBC 5.1 4.8 - 10.8 K/uL    RBC 7.30 (H) 4.20 - 5.40 M/uL    Hemoglobin 19.6 (H) 12.0 - 16.0 g/dL    Hematocrit 60.9 (H) 37.0 - 47.0 %    MCV 83.4 81.4 - 97.8 fL    MCH 26.8 (L) 27.0 - 33.0 pg    MCHC 32.2 (L) 33.6 - 35.0 g/dL    RDW 45.0 35.9 - 50.0 fL    Platelet Count 181 164 - 446 K/uL    MPV 11.4 9.0 - 12.9 fL    Neutrophils-Polys 64.10 44.00 - 72.00 %    Lymphocytes 31.30 22.00 - 41.00 %    Monocytes 3.60 0.00 - 13.40 %    Eosinophils 0.20 0.00 - 6.90 %    Basophils 0.60 0.00 - 1.80 %    Immature Granulocytes 0.20 0.00 - 0.90 %    Nucleated RBC 0.00 /100 WBC    Neutrophils (Absolute) 3.24 2.00 - 7.15 K/uL    Lymphs (Absolute) 1.58 1.00 - 4.80 K/uL    Monos (Absolute) 0.18 0.00 - 0.85 K/uL    Eos (Absolute) 0.01 0.00 - 0.51 K/uL    Baso (Absolute) 0.03 0.00 - 0.12 K/uL    Immature Granulocytes (abs) 0.01 0.00 - 0.11 K/uL    NRBC (Absolute) 0.00 K/uL   COMP METABOLIC PANEL    Collection Time: 07/15/20  5:52 PM   Result Value Ref Range    Sodium 136 135 - 145 mmol/L    Potassium 4.1 3.6 - 5.5 mmol/L     Chloride 99 96 - 112 mmol/L    Co2 18 (L) 20 - 33 mmol/L    Anion Gap 19.0 (H) 7.0 - 16.0    Glucose 87 65 - 99 mg/dL    Bun 10 8 - 22 mg/dL    Creatinine 0.62 0.50 - 1.40 mg/dL    Calcium 9.4 8.5 - 10.5 mg/dL    AST(SGOT) 33 12 - 45 U/L    ALT(SGPT) 27 2 - 50 U/L    Alkaline Phosphatase 74 30 - 99 U/L    Total Bilirubin 0.3 0.1 - 1.5 mg/dL    Albumin 4.8 3.2 - 4.9 g/dL    Total Protein 8.3 (H) 6.0 - 8.2 g/dL    Globulin 3.5 1.9 - 3.5 g/dL    A-G Ratio 1.4 g/dL   HCG QUAL SERUM    Collection Time: 07/15/20  5:52 PM   Result Value Ref Range    Beta-Hcg Qualitative Serum Negative Negative   FREE THYROXINE    Collection Time: 07/15/20  5:52 PM   Result Value Ref Range    Free T-4 1.16 0.93 - 1.70 ng/dL   TSH    Collection Time: 07/15/20  5:52 PM   Result Value Ref Range    TSH 1.330 0.380 - 5.330 uIU/mL   ESTIMATED GFR    Collection Time: 07/15/20  5:52 PM   Result Value Ref Range    GFR If African American >60 >60 mL/min/1.73 m 2    GFR If Non African American >60 >60 mL/min/1.73 m 2   CBC with Differential    Collection Time: 07/16/20  4:35 AM   Result Value Ref Range    WBC 5.3 4.8 - 10.8 K/uL    RBC 4.28 4.20 - 5.40 M/uL    Hemoglobin 11.5 (L) 12.0 - 16.0 g/dL    Hematocrit 36.4 (L) 37.0 - 47.0 %    MCV 84.6 81.4 - 97.8 fL    MCH 26.9 (L) 27.0 - 33.0 pg    MCHC 31.8 (L) 33.6 - 35.0 g/dL    RDW 45.1 35.9 - 50.0 fL    Platelet Count 298 164 - 446 K/uL    MPV 11.3 9.0 - 12.9 fL    Neutrophils-Polys 41.90 (L) 44.00 - 72.00 %    Lymphocytes 49.70 (H) 22.00 - 41.00 %    Monocytes 7.40 0.00 - 13.40 %    Eosinophils 0.60 0.00 - 6.90 %    Basophils 0.20 0.00 - 1.80 %    Immature Granulocytes 0.20 0.00 - 0.90 %    Nucleated RBC 0.00 /100 WBC    Neutrophils (Absolute) 2.22 2.00 - 7.15 K/uL    Lymphs (Absolute) 2.63 1.00 - 4.80 K/uL    Monos (Absolute) 0.39 0.00 - 0.85 K/uL    Eos (Absolute) 0.03 0.00 - 0.51 K/uL    Baso (Absolute) 0.01 0.00 - 0.12 K/uL    Immature Granulocytes (abs) 0.01 0.00 - 0.11 K/uL    NRBC (Absolute)  0.00 K/uL   Comp Metabolic Panel (CMP)    Collection Time: 07/16/20  4:35 AM   Result Value Ref Range    Sodium 137 135 - 145 mmol/L    Potassium 3.7 3.6 - 5.5 mmol/L    Chloride 102 96 - 112 mmol/L    Co2 22 20 - 33 mmol/L    Anion Gap 13.0 7.0 - 16.0    Glucose 84 65 - 99 mg/dL    Bun 8 8 - 22 mg/dL    Creatinine 0.54 0.50 - 1.40 mg/dL    Calcium 9.0 8.5 - 10.5 mg/dL    AST(SGOT) 18 12 - 45 U/L    ALT(SGPT) 20 2 - 50 U/L    Alkaline Phosphatase 65 30 - 99 U/L    Total Bilirubin 0.4 0.1 - 1.5 mg/dL    Albumin 4.1 3.2 - 4.9 g/dL    Total Protein 7.2 6.0 - 8.2 g/dL    Globulin 3.1 1.9 - 3.5 g/dL    A-G Ratio 1.3 g/dL   Lipid Profile (Lipid Panel) Fasting    Collection Time: 07/16/20  4:35 AM   Result Value Ref Range    Cholesterol,Tot 195 100 - 199 mg/dL    Triglycerides 95 0 - 149 mg/dL    HDL 43 >=40 mg/dL     (H) <100 mg/dL   MAGNESIUM    Collection Time: 07/16/20  4:35 AM   Result Value Ref Range    Magnesium 2.3 1.5 - 2.5 mg/dL   ESTIMATED GFR    Collection Time: 07/16/20  4:35 AM   Result Value Ref Range    GFR If African American >60 >60 mL/min/1.73 m 2    GFR If Non African American >60 >60 mL/min/1.73 m 2            Assessment/Plan     Summary: 24-year-old female with a history migraines and seizures on day 1 of hospitalization for dizziness and left-sided weakness.     #Weakness   Assessment:   - Differentials include atypical migraine vs seizure disorder vs CSF leak vs somatic symptom disorder vs illness anxiety disorder.   - Far less likely differentials worth considering include multiple sclerosis vs neurosarcoid.   - She has had multiple visits to the [facility] ED with different complaints over the past year and reports that she has also been to [facility]. Her frequent ED visits, and use of medical terminology during our discussion with her make the possibility of somatic symptom disorder vs illness anxiety disorder likely. However, these are diagnoses reserved after a medical cause has not been found.    - The history of shaking and voiding in her sleep make the possibility of seizures likely. She has not formally been evaluated by a neurologist yet for seizures and she is not currently on a medication to prevent seizures.    - The patient has reported history of migraine headaches with symptoms that sound similar to what she has experienced over the last week. It is possible that she is having recurrent atypical migraines.   - The possibility of a CSF leak is less likely with two normal MRI readings and a history that is negative for head trauma.   - The patient describes experiencing multiple symptoms in different areas of her body at different times. This increases suspicion for MS but her normal MRI findings make this diagnosis unlikely. Neurosarcoid can also present with optic neuritis, multiple symptoms in different areas of the body. In addition, neurosarcoid can affect the facial nerve and vestibular nerve. However, this is a rare condition that typically presents with pulmonary findings.   Plan:   - Neurology following   - EEG  - Tylenol and Ibuprofen for headache    #Dizziness   Assessment:   - DDx include headache versus dehydration.   - Most likely related to her headache. This morning, the patient’s headache resolved and so has her dizziness.    - She has been vomiting for days and presented with a bicarb of 18 and anion gap of 19. She received fluids overnight so her headache and dizziness may have been due to dehydration.   - It is also possible that the dizziness could be a result of vestibular neuritis since she did have ear pain with fluid drainage 1 month ago, but this possibility is less likely. Her ear pain was several weeks ago and it has resolved.  - It is unlikely that she has a mass lesion affecting her vestibular nerve. As the dizziness has resolved this morning after fluid hydration and rest.     Plan:   - IVF hydration   - Tylenol PRN for headache     #Headache  Assessment:   - Reported  history of migraines in the past.   - She had associated symptoms of photophobia, nausea, and vomiting that are typical of her migraine headaches.   - The patient reports having feelings of anxiety and depression related to her symptoms. A large contributing factor to her headache was likely related to stress, as well as feelings of anxiety and depression.   Plan:   - Tylenol and Ibuprofen PRN   - If no improvement with Tylenol/Ibuprofen, diphenhydramine 25 mg IV followed by prochlorperazine 10 mg IV PRN  #Nausea  #Vomiting   Assessment:   - The patient reports feeling a spinning dizziness for a weak. It is possible that the nausea and vomiting is a result of the dizziness. She denies recent sick contacts. There is no other inciting event that explains the etiology of her N/V. She has had no nausea this morning     Plan:   - Ondansetron 4mg PRN     #Potential Seizures  Assessment:   - The patient presented with a history of potential seizures. She has never had an EEG in the past. At this point, it is unclear whether she is having seizures or psychogenic nonepileptic seizures. She has been taking clonazepam for seizure management but she is not currently on any seizure medication.   Plan:   - EEG   - Clonazepam 2 mg PRN   - Neurology following

## 2020-07-16 NOTE — ASSESSMENT & PLAN NOTE
Self-reported history of seizures  On Klonopin PRN at home for seizures  [place] prodrome symptoms in hospital and asked for Klonopin on evening of admission  MR brain/c-spine negative  Normal EEG.  @Plan:  - Appreciate neurology recommendations and insight  - Klonopin PRN

## 2020-07-16 NOTE — PROGRESS NOTES
Received report from NOC shift RN. Patient is A&Ox4, no complaints of pain, complains of left sided numbness and tingling. VSS, no signs of distress. All questions and concerns answered, bed in lowest and locked position, call light in reach, will continue to monitor.

## 2020-07-16 NOTE — SENIOR ADMIT NOTE
Senior Admit Note    Sadia Troncoso is a 24 y.o. female presenting with intractable nausea and vomiting for the past week. The patient reports a history of CSF leak based on previous MRI. She attributes her symptoms to this CSF leak. The patient also has multiple complaints including generalized weakness, fatigue, tremors in all of her extremities. The patient reports that she has a history of seizures with a variety of manifestations including generalized tonic clonic. The patient also mentions that she has increasing pain to light touch all over her body.    Pertinent Physical Exam:  General: No acute distress  HEENT: PERRL  Heart: RRR. Neg. S3. Neg. S4.  Neuro: Chronic decreased sensation left face.     Labs:  -Hemoglobin 19.6 (? Lab error), hematocrit 60.9, platelets 181    Assessment & Plan  #Nausea and Vomiting  #Generalized weakness  #Hyperalgesia  #Seizure disorder  #Headache  -Patient presenting with a large variety of neurological complaints  -No acute focal deficits  Plan:  -Admit to neurology  -Every four hour neuro checks  -Neuro, Dr. Lehman, consulted in the ED - will see patient in AM. Appreciate recommendations  -MRI Cervical spine and MRI brain per neuro    DVT Prophylaxis with heparin.    Please see Dr. Reyes's H&P for full details.    Ed El D.O., [facility] Internal Medicine Resident
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
## 2020-07-16 NOTE — ASSESSMENT & PLAN NOTE
-Pt reports 3 years of lethargy that has not been improved by exercise, anti-depressants, vitamins, etc  -Pt has been hypersomnolent for 3 years  -Has caused pt to not be able to work    -Neurology consulted from ED, will see pt in AM (Dr. Lehman) appreciate recs  -Continue to monitor

## 2020-07-16 NOTE — PROGRESS NOTES
ED Admit    24-year-old female with history of seizure disorder, reportedly recently diagnosed with CSF leak on MRI, presented with complaints of nausea and vomiting, tingling in face and extremities, dizziness, fatigue.  ERP consulted with Dr. Lehman/neurology.  MRI of the brain and neck was done and negative for acute findings.  Patient is to be admitted to neurology for observation and formal neurology consult tomorrow morning    Patient to be admitted by [facility] internal medicine

## 2020-07-16 NOTE — ASSESSMENT & PLAN NOTE
Patient reports 1 week of nausea and vomiting leading to PO intolerance  May be related to chronic migraines  Now resolved, patient expresses appetite    Plan:  - Regular diet  - Anti-emetics PRN

## 2020-07-16 NOTE — PROGRESS NOTES
Paged [facility] yellow and received page back from Renu Reyes MD, Updated DR. Reyes about the need for seizure medication orders, and pain medication for 10/10 headache. Waiting for orders to be placed.
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
## 2020-07-16 NOTE — PROGRESS NOTES
[facility] Cross-Coverage Note    Received page from RN that pt was citing exquisite headache causing loss of peripheral vision (not alleviated by PRN tylenol) and also felt as though a seizure was coming on. RN said pt stated that she normally takes Klonopin at home for seizures and that she had not taken her night dose, which was usually taken at 8 PM.     Went to bedside to assess patient. Patient appeared stable, not in acute distress. Pt stated that her tunnel vision had receded and she was now able to see in her periphery. Pt stated that she has headaches before but that the headaches are usually alleviated by cold compresses and Tylenol or Advil.    Ordered Klonopin for pt and cold compresses via nursing communication. Also ordered pt's home duonebs medication.    Continue to monitor.    Renu Reyes MD, MPH  [facility], PGY-1

## 2020-07-16 NOTE — PROCEDURES
ROUTINE ELECTROENCEPHALOGRAM REPORT      Referring provider: Dr. Newton.     DOS: 7/16/2020 (total recording of 24 minutes)    INDICATION:  Sadia Troncoso 24 y.o. female presenting with seizures, nausea and vomiting.    CURRENT ANTIEPILEPTIC REGIMEN: Midazolam, Clonazepam, Lorazepam.     TECHNIQUE: 30 channel routine electroencephalogram (EEG) was performed in accordance with the international 10-20 system. The study was reviewed in bipolar and referential montages. The recording examined the patient during wakeful and drowsy state(s).     DESCRIPTION OF THE RECORD:  During the wakefulness, the background showed a symmetrical 12 hz alpha activity posteriorly with amplitude of 70 mV.  There was reactivity to eye closure/opening.  A normal anterior-posterior gradient was noted with faster beta frequencies seen anteriorly.  During drowsiness, theta/delta frequencies were seen.    ACTIVATION PROCEDURES:   Hyperventilation was performed by the patient for a total of 3 minutes. The technician performing the test noted good effort. This technique produced electroencephalographic changes in keeping with the expected bilaterally synchronous, frontally predominant, high amplitude slow waves build up.     Intermittent Photic stimulation was performed in a stepwise fashion from 1 to 30 Hz and elicited a normal response (photic driving), most noticeable in the posterior leads.      ICTAL AND/OR INTERICTAL FINDINGS:   No focal or generalized epileptiform activity noted. No regional slowing was seen during this routine study.  No seizures were reported or recorded during the study.      The following episodes were captured:  -Episodes of feeling lightheaded, sweaty and clammy.   -Episodes of tingling sensation in toes and fingers.  -Episodes of head pounding sensation.  -Episodes of mild twitching in her feet, head, hand, feet in dystonic posturing were captured after HV and during PS.     Episodes lasting several minutes.  All the  spells captured were nonepileptic.  Motor events were psychogenic, and nonepileptic in nature.        EKG: sampling of the EKG recording demonstrated sinus rhythm.       INTERPRETATION:  This is a normal routine EEG recording in the awake and drowsy state(s).  Episodes of feeling lightheaded, sweaty and clammy, episodes of tingling sensation in toes and fingers, episodes of head pounding sensation, episodes of mild twitching in her feet, head, hand, feet in dystonic posturing were captured after HV and during PS. Episodes lasting several minutes.  All the episodes captured were nonepileptic. Motor events (twiching) were psychogenic, and nonepileptic in nature.  Clinical correlation is recommended.    Note: A normal EEG does not rule out epilepsy.  If the clinical suspicion remains high for seizures, a prolonged recording to capture clinical or subclinical events may be helpful.        Zi Sadler MD   Epilepsy and Neurodiagnostics.   Clinical  of Neurology [facility].   Diplomate in Neurology, Epilepsy, and Electrodiagnostic Medicine.   Office: [phone]  Fax: [phone]

## 2020-07-16 NOTE — ED NOTES
Pt's mother, Zeynep, called the ED looking for an update. Pt was asked and did not want an update given to her mother. Pt's mother was no longer on the phone after being put on hold.

## 2020-07-16 NOTE — ASSESSMENT & PLAN NOTE
Patient reports LEFT sided face, upper extremity, and lower extremity weakness  Physical exam incongruent   MRI brain/c-spine WNL  Normal EEG.  Neurology on board,Appreciate recs.  @Plan:  - Fall precautions in place  - Seizure precautions

## 2020-07-16 NOTE — ED PROVIDER NOTES
"ED Provider Note     Scribed for Gertrudis Villaseñor D.O. by Sonal Cruz. 7/15/2020, 6:07 PM.     Primary care provider: Kishan Alanis M.D.  Means of arrival: Walk in         History obtained from: patient   History limited by: none    CHIEF COMPLAINT  Chief Complaint   Patient presents with   • N/V     X 1wk        HPI  Sadia Troncoso is a 24 y.o. female who presents to the emergency Department for nausea and vomiting onset 1 week ago. Patient describe that for the last month she has been having medical issues and was diagnosed with a CSF leak found on an MRI. Denies any recent trauma or previous spinal tap. She has associated tingling in face and extremities, dizziness, fatigue, decreased appetite, weakness, and full body twitching starting today. Patient describes that she \"feels hung over\" and has a pulsing in her head. Her PCP is Dr. Alanis and she has been seen by Dr. Chand (neuro surgery) but does not have insurance so she has not been able to follow up with neurology.    REVIEW OF SYSTEMS  Pertinent positives include nausea, vomiting, tingling in face and extremities, dizziness, fatigue, decreased appetite, weakness, full body twitching, and pulsing sensation  in head. Pertinent negatives include no fevers, cough, or shortness of breath.   See HPI for further details. All other systems are negative.    PAST MEDICAL HISTORY  Past Medical History:   Diagnosis Date   • Anxiety    • Seizure disorder (HCC)    • Seizures (HCC)        FAMILY HISTORY  History reviewed. No pertinent family history.    SOCIAL HISTORY  Social History     Tobacco Use   • Smoking status: Current Some Day Smoker   • Smokeless tobacco: Never Used   Substance Use Topics   • Alcohol use: Yes     Frequency: Monthly or less     Binge frequency: Less than monthly   • Drug use: No      Social History     Substance and Sexual Activity   Drug Use No       SURGICAL HISTORY  History reviewed. No pertinent surgical history.    CURRENT MEDICATIONS  No " "current facility-administered medications for this encounter.     Current Outpatient Medications:   •  ipratropium-albuterol (DUONEB) 0.5-2.5 (3) MG/3ML nebulizer solution, 3 mL by Nebulization route 4 times a day., Disp: , Rfl:   •  clonazepam (KLONOPIN) 2 MG tablet, Take 2 mg by mouth at bedtime as needed., Disp: , Rfl:     ALLERGIES  No Known Allergies    PHYSICAL EXAM  VITAL SIGNS: /56   Pulse 94   Temp 37.4 °C (99.3 °F) (Temporal)   Resp 20   Ht 1.549 m (5' 1\")   Wt 69.5 kg (153 lb 3.5 oz)   LMP 06/28/2020   SpO2 98%   BMI 28.95 kg/m²     Constitutional: Patient is well developed, well nourished.  Moderate distress, extremely anxious.  HENT: Normocephalic, atraumatic. Nose normal with no drainage. Oropharynx moist .  Eyes: PERRL, EOMI, Conjunctiva without erythema or exudates.   Neck: Supple with no  Normal range of motion in flexion, extension and lateral rotation. No tenderness along the bony prominences or paraspinal muscles. No rigidity.  Cardiovascular: Normal heart rate and Regular rhythm. No murmur  Thorax & Lungs: Clear and equal breath sounds with good excursion. No respiratory distress  Skin: Warm, Dry, No erythema, No rashes.   Extremities: Peripheral pulses 4/4 No edema, No tenderness   Musculoskeletal: Normal range of motion in all major joints. No tenderness to palpation or major deformities noted.   Neurologic: Alert & oriented x 3, Inability to move facial muscles with subjective facial paraesthesias, left upper and lower extremity weakness with subjective sensory deficits, uncontrolled spontaneous twitching of extremities.   Psychiatric: Affect odd, Judgment normal, Mood anxious.     DIAGNOSTICS/PROCEDURES    LABS  Results for orders placed or performed during the hospital encounter of 07/15/20   CBC WITH DIFFERENTIAL   Result Value Ref Range    WBC 5.1 4.8 - 10.8 K/uL    RBC 7.30 (H) 4.20 - 5.40 M/uL    Hemoglobin 19.6 (H) 12.0 - 16.0 g/dL    Hematocrit 60.9 (H) 37.0 - 47.0 %    " MCV 83.4 81.4 - 97.8 fL    MCH 26.8 (L) 27.0 - 33.0 pg    MCHC 32.2 (L) 33.6 - 35.0 g/dL    RDW 45.0 35.9 - 50.0 fL    Platelet Count 181 164 - 446 K/uL    MPV 11.4 9.0 - 12.9 fL    Neutrophils-Polys 64.10 44.00 - 72.00 %    Lymphocytes 31.30 22.00 - 41.00 %    Monocytes 3.60 0.00 - 13.40 %    Eosinophils 0.20 0.00 - 6.90 %    Basophils 0.60 0.00 - 1.80 %    Immature Granulocytes 0.20 0.00 - 0.90 %    Nucleated RBC 0.00 /100 WBC    Neutrophils (Absolute) 3.24 2.00 - 7.15 K/uL    Lymphs (Absolute) 1.58 1.00 - 4.80 K/uL    Monos (Absolute) 0.18 0.00 - 0.85 K/uL    Eos (Absolute) 0.01 0.00 - 0.51 K/uL    Baso (Absolute) 0.03 0.00 - 0.12 K/uL    Immature Granulocytes (abs) 0.01 0.00 - 0.11 K/uL    NRBC (Absolute) 0.00 K/uL   COMP METABOLIC PANEL   Result Value Ref Range    Sodium 136 135 - 145 mmol/L    Potassium 4.1 3.6 - 5.5 mmol/L    Chloride 99 96 - 112 mmol/L    Co2 18 (L) 20 - 33 mmol/L    Anion Gap 19.0 (H) 7.0 - 16.0    Glucose 87 65 - 99 mg/dL    Bun 10 8 - 22 mg/dL    Creatinine 0.62 0.50 - 1.40 mg/dL    Calcium 9.4 8.5 - 10.5 mg/dL    AST(SGOT) 33 12 - 45 U/L    ALT(SGPT) 27 2 - 50 U/L    Alkaline Phosphatase 74 30 - 99 U/L    Total Bilirubin 0.3 0.1 - 1.5 mg/dL    Albumin 4.8 3.2 - 4.9 g/dL    Total Protein 8.3 (H) 6.0 - 8.2 g/dL    Globulin 3.5 1.9 - 3.5 g/dL    A-G Ratio 1.4 g/dL   HCG QUAL SERUM   Result Value Ref Range    Beta-Hcg Qualitative Serum Negative Negative   FREE THYROXINE   Result Value Ref Range    Free T-4 1.16 0.93 - 1.70 ng/dL   TSH   Result Value Ref Range    TSH 1.330 0.380 - 5.330 uIU/mL   ESTIMATED GFR   Result Value Ref Range    GFR If African American >60 >60 mL/min/1.73 m 2    GFR If Non African American >60 >60 mL/min/1.73 m 2     Labs reviewed by me    RADIOLOGY/PROCEDURES  MR-BRAIN-WITH & W/O    (Results Pending)   MR-CERVICAL SPINE-WITH & W/O    (Results Pending)     Results and radiologist interpretation reviewed by me.     COURSE & MEDICAL DECISION MAKING  Pertinent Labs &  Imaging studies reviewed. (See chart for details)    6:07 PM - Patient seen and evaluated at bedside. I discussed that we will treat her with fluids and medications for her symptoms. We will start labs and imaging in the ED however I informed the patient that she will need to be admitted for further treatment. Ordered for MR brain, free thyroxine, CBC w/ diff, CMP, and TSH to evaluate. Patient will be treated with Ativan 1 mg for her symptoms. Differential diagnoses include, but are not limited to, CSF leak vs seizure disorder vs MS   Patient's laboratories were reviewed and were unremarkable.  These were discussed with the patient.  I did check her thyroid as well and it was within normal limits.  Her pregnancy test was negative.  Her H&H is stable with no left shift.  Electrolytes are all unremarkable as well.     6:41 PM - Paged neurology.    6:46 PM I discussed the patient's case and the above findings with Dr. Lehman (Neurology) who would like an MRI of the brain and neck. He agrees to see the patient tomorrow. Ordered MR brain.    9:39 PM - Paged Hospitalist.    9:40 PM - Patient was reevaluated at bedside. Discussed lab and radiology results with the patient and informed them that everything was negative. Discussed admission and follow up with neuro. Patient is agreeable to admission and plan of care.       10:06 PM - I discussed the patient's case and the above findings with ([facility]) who agreed to evaluate patient for hospitalization. Patients care will be transferred at this time.     DISPOSITION:  Patient will be hospitalized by [facility] in guarded condition.    FINAL IMPRESSION  1. Dizziness    2. Left-sided weakness    3. Anxiety         Sonal GONCALVES (Maribeth), am scribing for, and in the presence of, Gertrudis Villaseñor D.O..    Electronically signed by: Sonal Cruz (Maribeth), 7/15/2020    Gertrudis GONCALVES D.O. personally performed the services described in this documentation, as scribed by Sonal Cruz in my  presence, and it is both accurate and complete. C    The note accurately reflects work and decisions made by me.  Gertrudis Villaseñor D.O.  7/16/2020  2:38 AM

## 2020-07-17 VITALS
DIASTOLIC BLOOD PRESSURE: 61 MMHG | HEIGHT: 61 IN | BODY MASS INDEX: 30.18 KG/M2 | SYSTOLIC BLOOD PRESSURE: 98 MMHG | HEART RATE: 90 BPM | OXYGEN SATURATION: 98 % | TEMPERATURE: 98.5 F | WEIGHT: 159.83 LBS | RESPIRATION RATE: 20 BRPM

## 2020-07-17 LAB
25(OH)D3 SERPL-MCNC: 30 NG/ML (ref 30–100)
ANION GAP SERPL CALC-SCNC: 14 MMOL/L (ref 7–16)
BASOPHILS # BLD AUTO: 0.5 % (ref 0–1.8)
BASOPHILS # BLD: 0.03 K/UL (ref 0–0.12)
BUN SERPL-MCNC: 12 MG/DL (ref 8–22)
CALCIUM SERPL-MCNC: 9.3 MG/DL (ref 8.5–10.5)
CHLORIDE SERPL-SCNC: 103 MMOL/L (ref 96–112)
CO2 SERPL-SCNC: 20 MMOL/L (ref 20–33)
CREAT SERPL-MCNC: 0.59 MG/DL (ref 0.5–1.4)
EKG IMPRESSION: NORMAL
EOSINOPHIL # BLD AUTO: 0.05 K/UL (ref 0–0.51)
EOSINOPHIL NFR BLD: 0.8 % (ref 0–6.9)
ERYTHROCYTE [DISTWIDTH] IN BLOOD BY AUTOMATED COUNT: 45.3 FL (ref 35.9–50)
FOLATE SERPL-MCNC: 8.9 NG/ML
GLUCOSE SERPL-MCNC: 89 MG/DL (ref 65–99)
HCT VFR BLD AUTO: 37.6 % (ref 37–47)
HGB BLD-MCNC: 12 G/DL (ref 12–16)
IMM GRANULOCYTES # BLD AUTO: 0.02 K/UL (ref 0–0.11)
IMM GRANULOCYTES NFR BLD AUTO: 0.3 % (ref 0–0.9)
LYMPHOCYTES # BLD AUTO: 2.72 K/UL (ref 1–4.8)
LYMPHOCYTES NFR BLD: 46.2 % (ref 22–41)
MAGNESIUM SERPL-MCNC: 2.1 MG/DL (ref 1.5–2.5)
MCH RBC QN AUTO: 27.2 PG (ref 27–33)
MCHC RBC AUTO-ENTMCNC: 31.9 G/DL (ref 33.6–35)
MCV RBC AUTO: 85.3 FL (ref 81.4–97.8)
MONOCYTES # BLD AUTO: 0.4 K/UL (ref 0–0.85)
MONOCYTES NFR BLD AUTO: 6.8 % (ref 0–13.4)
NEUTROPHILS # BLD AUTO: 2.67 K/UL (ref 2–7.15)
NEUTROPHILS NFR BLD: 45.4 % (ref 44–72)
NRBC # BLD AUTO: 0 K/UL
NRBC BLD-RTO: 0 /100 WBC
PLATELET # BLD AUTO: 298 K/UL (ref 164–446)
PMV BLD AUTO: 10.9 FL (ref 9–12.9)
POTASSIUM SERPL-SCNC: 4.1 MMOL/L (ref 3.6–5.5)
RBC # BLD AUTO: 4.41 M/UL (ref 4.2–5.4)
SODIUM SERPL-SCNC: 137 MMOL/L (ref 135–145)
VIT B12 SERPL-MCNC: 533 PG/ML (ref 211–911)
WBC # BLD AUTO: 5.9 K/UL (ref 4.8–10.8)

## 2020-07-17 PROCEDURE — G0378 HOSPITAL OBSERVATION PER HR: HCPCS

## 2020-07-17 PROCEDURE — 82607 VITAMIN B-12: CPT

## 2020-07-17 PROCEDURE — 83735 ASSAY OF MAGNESIUM: CPT

## 2020-07-17 PROCEDURE — A9270 NON-COVERED ITEM OR SERVICE: HCPCS | Performed by: STUDENT IN AN ORGANIZED HEALTH CARE EDUCATION/TRAINING PROGRAM

## 2020-07-17 PROCEDURE — 80048 BASIC METABOLIC PNL TOTAL CA: CPT

## 2020-07-17 PROCEDURE — 700102 HCHG RX REV CODE 250 W/ 637 OVERRIDE(OP): Performed by: STUDENT IN AN ORGANIZED HEALTH CARE EDUCATION/TRAINING PROGRAM

## 2020-07-17 PROCEDURE — 85025 COMPLETE CBC W/AUTO DIFF WBC: CPT

## 2020-07-17 PROCEDURE — 700105 HCHG RX REV CODE 258: Performed by: INTERNAL MEDICINE

## 2020-07-17 PROCEDURE — 93010 ELECTROCARDIOGRAM REPORT: CPT | Performed by: INTERNAL MEDICINE

## 2020-07-17 PROCEDURE — 99214 OFFICE O/P EST MOD 30 MIN: CPT | Performed by: PSYCHIATRY & NEUROLOGY

## 2020-07-17 PROCEDURE — 93005 ELECTROCARDIOGRAM TRACING: CPT | Performed by: INTERNAL MEDICINE

## 2020-07-17 PROCEDURE — 82746 ASSAY OF FOLIC ACID SERUM: CPT

## 2020-07-17 PROCEDURE — 99217 PR OBSERVATION CARE DISCHARGE: CPT | Mod: GC | Performed by: INTERNAL MEDICINE

## 2020-07-17 PROCEDURE — 82306 VITAMIN D 25 HYDROXY: CPT

## 2020-07-17 PROCEDURE — 36415 COLL VENOUS BLD VENIPUNCTURE: CPT

## 2020-07-17 RX ORDER — SODIUM CHLORIDE 9 MG/ML
500 INJECTION, SOLUTION INTRAVENOUS ONCE
Status: COMPLETED | OUTPATIENT
Start: 2020-07-17 | End: 2020-07-17

## 2020-07-17 RX ORDER — BUTALBITAL, ACETAMINOPHEN AND CAFFEINE 50; 325; 40 MG/1; MG/1; MG/1
1 TABLET ORAL EVERY 6 HOURS PRN
Qty: 30 TAB | Refills: 0 | Status: SHIPPED | OUTPATIENT
Start: 2020-07-17 | End: 2020-07-17

## 2020-07-17 RX ORDER — ACETAMINOPHEN 325 MG/1
650 TABLET ORAL ONCE
Status: DISCONTINUED | OUTPATIENT
Start: 2020-07-17 | End: 2020-07-17

## 2020-07-17 RX ORDER — ACETAMINOPHEN 325 MG/1
650 TABLET ORAL
Status: DISCONTINUED | OUTPATIENT
Start: 2020-07-17 | End: 2020-07-17 | Stop reason: HOSPADM

## 2020-07-17 RX ORDER — BUTALBITAL, ACETAMINOPHEN AND CAFFEINE 50; 325; 40 MG/1; MG/1; MG/1
1 TABLET ORAL EVERY 6 HOURS PRN
Qty: 28 TAB | Refills: 0 | Status: SHIPPED | OUTPATIENT
Start: 2020-07-17 | End: 2020-07-24

## 2020-07-17 RX ADMIN — BUTALBITAL, ACETAMINOPHEN, AND CAFFEINE 1 TABLET: 50; 325; 40 TABLET ORAL at 08:57

## 2020-07-17 RX ADMIN — SODIUM CHLORIDE 500 ML: 9 INJECTION, SOLUTION INTRAVENOUS at 04:08

## 2020-07-17 ASSESSMENT — ENCOUNTER SYMPTOMS
EYE PAIN: 0
SHORTNESS OF BREATH: 0
ABDOMINAL PAIN: 0
HEADACHES: 0
CONSTIPATION: 0
NERVOUS/ANXIOUS: 0
BACK PAIN: 0
BRUISES/BLEEDS EASILY: 0
COUGH: 0
DIARRHEA: 0
SPEECH CHANGE: 0
VOMITING: 0
POLYDIPSIA: 0
NECK PAIN: 0
TINGLING: 0
SENSORY CHANGE: 0
CHILLS: 0
INSOMNIA: 0
NAUSEA: 0
BLURRED VISION: 0
TREMORS: 0
MEMORY LOSS: 0
FEVER: 0
PALPITATIONS: 0
SORE THROAT: 0

## 2020-07-17 NOTE — PROGRESS NOTES
Cross Cover Medicine Note    Page about low BP. Per nurse, the patient had BP 88/54 with manual repeat of 84/52. Patient was asymptomatic and A&Ox4. Order given for 500 cc bolus NS. This was discussed with my senior resident and the nurse. I will continue to monitor the patient.

## 2020-07-17 NOTE — PROGRESS NOTES
Report received at bedside from NOC RN at 0700, pt care assumed, tele box on. Pt A&Ox4, no signs of distress noted at this time. Patient resting comfortably in bed. POC discussed with pt and verbalizes that she would like to speak to the doctors this morning. Pt stated she has a headache, 5/10 pain .Bed in lowest position, pt educated on fall risk and verbalized understanding, call light within reach, bed alarm plugged in and on. Will continue to monitor.

## 2020-07-17 NOTE — NON-PROVIDER
"       Internal Medicine Medical Student Note  Note Author: Celina Cha, Student    Name Sadia Troncoso 1995   Age/Sex 24 y.o. female   MRN [id number]   Code Status full     Reason for interval visit  (Principal Problem)   Weakness     ID:   Sadia Troncoso is a 24 year old female with a reported history of migraines and seizures on day 2 of hospitalization for weakness     Interval Problem Daily Status Update  (problem status, last 24 hours, new history, new data )   The patient had an EEG yesterday that showed normal wakeful and drowsy states. She had motor events during the EEG and they were described as psychogenic, nonepileptic. All captured episodes were nonepileptic. She had one hypotensive episode in the 80s/60s yesterday but was asymptomatic and fully alert and oriented. Given 500CC fluid bolus and has maintained stable vitals since. She was reporting reproducable 7/10, intermittent, left sided chest pain with radiation into the left armpit after being awoken around 3AM. At that time she had an associated headache. Stat EKG normal with a NSR at 73. Given acetaminophen for pain.     This morning the reports that she is no longer having chest or head pain. She is still having persistent weakness that is worse on the left. Reports that she had an episode of left sided facial \"twitching\" yesterday that has resolved. She is not having any muscle spasms this morning.     Physical Exam       Vitals:    20 2300 20 0300 20 0345 20 0458   BP: 103/60 (!) 88/54 (!) 84/52 (!) 96/64   Pulse: 89 64  74   Resp: 18 18  (!) 22   Temp: 36.6 °C (97.9 °F) 36.6 °C (97.8 °F)  36.9 °C (98.5 °F)   TempSrc: Temporal Temporal  Temporal   SpO2: 100% 98%     Weight:       Height:         Body mass index is 30.2 kg/m². Weight: 72.5 kg (159 lb 13.3 oz)  Oxygen Therapy:  Pulse Oximetry: 98 %, O2 (LPM): 0, O2 Delivery Device: None - Room Air    Physical Exam   Constitutional: She is oriented to person, " place, and time. No distress.   HENT:   Head: Normocephalic and atraumatic.   Eyes: Conjunctivae and EOM are normal.   Neck: Neck supple.   Cardiovascular: Normal rate and regular rhythm.   No murmur heard.  Pulmonary/Chest: Effort normal and breath sounds normal. No respiratory distress. She has no wheezes. She has no rales.   Abdominal: Soft. Bowel sounds are normal. She exhibits no mass. There is no abdominal tenderness. There is no rebound and no guarding.   Musculoskeletal: Normal range of motion.         General: No edema.   Neurological: She is alert and oriented to person, place, and time.   CN II-XII intact  -Normal muscle tone  -5/5 strength in right upper and lower extremities   -4/5 strength in left upper and lower extremities)    Skin: No rash noted. She is not diaphoretic.     Lab Results   Component Value Date/Time    WBC 5.9 2020 04:12 AM    RBC 4.41 2020 04:12 AM    HEMOGLOBIN 12.0 2020 04:12 AM    HEMATOCRIT 37.6 2020 04:12 AM    MCV 85.3 2020 04:12 AM    MCH 27.2 2020 04:12 AM    MCHC 31.9 (L) 2020 04:12 AM    MPV 10.9 2020 04:12 AM    NEUTSPOLYS 45.40 2020 04:12 AM    LYMPHOCYTES 46.20 (H) 2020 04:12 AM    MONOCYTES 6.80 2020 04:12 AM    EOSINOPHILS 0.80 2020 04:12 AM    BASOPHILS 0.50 2020 04:12 AM      Recent Labs     07/15/20  1752 20  0435 20  0412   SODIUM 136 137 137   POTASSIUM 4.1 3.7 4.1   CHLORIDE 99 102 103   CO2 18* 22 20   GLUCOSE 87 84 89   BUN 10 8 12       Results for orders placed or performed during the hospital encounter of 07/15/20   EKG   Result Value Ref Range    Report       [facility]    Test Date:  2020  Pt Name:    JONATHAN DICKERSON                 Department: 171  MRN:        [id number]                      Room:       T711  Gender:     Female                       Technician: REBECCA  :        1995                   Requested By:RENATA NORTON  Order #:     [id number]                    Reading MD:    Measurements  Intervals                                Axis  Rate:       73                           P:          11  CO:         188                          QRS:        56  QRSD:       84                           T:          28  QT:         392  QTc:        432    Interpretive Statements  SINUS RHYTHM  Compared to ECG 04/06/2020 21:11:10  No significant changes       Tele: NSR 70-80s    Assessment/Plan     Summary: 24-year-old female with a history migraines and seizures on day 1 of hospitalization for dizziness and left-sided weakness.      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    · Diagnostic workup has been normal with a 2nd MRI w w/o contrast that is WNL, normal lab work, and normal EKG/tele monitoring.   · There has been no neurological cause for her symptoms. EEG was WNL. Dr. Velazquez spoke to Dr. Vieyra and Dr. Joshi and they do not believe that any interventions/further inpatient work up is warranted at this time.   · The patient acknowledges that she has been under a great deal of stress.   · The weakness and her other physical symptoms are a likely a physical manifestation of stress, anxiety, and depression.  · States that she tried SSRIs for 6 months without noted improvement. SSRIs may be beneficial for her symptoms of depression but concomitant therapy would be more effective, if the patient is willing.  Plan:   · Follow up with PCP  · Frequent,scheduled visits with one PCP to minimize unnecessary testing/procedures and to provide reassure to improve health anxiety.   ·   #Psychogenic, nonepileptic seizures  Assessment:   · EEG was normal. Muscle twitching and seizure-like activity was recorded to be nonepileptic.    Plan:  · Recommend speaking to a therapist/counselor about stress, anxiety, depression    #Headache  Assessment:   · Reported history of migraines in the past.   · She had associated symptoms of photophobia, nausea, and vomiting that are typical of her headaches.   · Likely exacerbated by dehydration, as well as feelings of anxiety and depression.   Plan:   · OTC pain medication for headache  · Fluid hydration    #Nausea, Vomiting - resolved  Assessment:   · The patient reports feeling a spinning dizziness for a weak. It is possible that the nausea and vomiting was a result of the dizziness. When the dizziness resolved so did the nausea and vomiting.       #Dizziness - resolved  Assessment:   · Presented to the hospital after multiple days of vomiting with a bicarb of 18.  · Electrolyte abnormality corrected after IVF  · Dizziness resolved after IV fluids
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
## 2020-07-17 NOTE — PROGRESS NOTES
2 RN Skin Check    2 RN skin check completed with Maury RN  Devices in place: SCDs.  Skin assessed under devices: N\A.  Confirmed pressure ulcers found on: NA.  New potential pressure ulcers noted on NA. Wound consult placed No.  The following interventions in place Pillows and Lotion.    All skin CDI

## 2020-07-17 NOTE — PROGRESS NOTES
Most likely traumatic in the setting of straight catheterization and Whiteside catheter placement with noticeable clot after initial placement of Whiteside catheter  Will continue patient on Plavix and monitor hematuria via Whiteside catheter  If hematuria does not resolve and hemoglobin/hematocrit drops, would consider Urology consult  Pts blood pressure 88/54, the patient is not symptomatic at this time. [facility] yellow Resident on call was notified, no new orders given.
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
## 2020-07-17 NOTE — CARE PLAN
Problem: Communication  Goal: The ability to communicate needs accurately and effectively will improve  Outcome: PROGRESSING AS EXPECTED    Patient educated to utilize call light. Patient and family oriented to hospital room. Patient encouraged to ask questions about plan of care. Patient effectively uses call light and is involved in POC.      Goal: Will remain free from falls  Outcome: PROGRESSING AS EXPECTED     Patient's risk for injury and falls assessed. Appropriate safety precautions in place. Patient educated to utilize call light for needs. Patient verbalizes understanding.

## 2020-07-17 NOTE — DISCHARGE INSTRUCTIONS
Discharge Instructions    Discharged to home by car with relative. Discharged via wheelchair, hospital escort: Yes.  Special equipment needed: Not Applicable    Be sure to schedule a follow-up appointment with your primary care doctor or any specialists as instructed.     Discharge Plan:   Diet Plan: Discussed  Activity Level: Discussed  Smoking Cessation Offered: Patient Refused  Confirmed Follow up Appointment: Patient to Call and Schedule Appointment  Confirmed Symptoms Management: Discussed  Medication Reconciliation Updated: Yes    I understand that a diet low in cholesterol, fat, and sodium is recommended for good health. Unless I have been given specific instructions below for another diet, I accept this instruction as my diet prescription.   Other diet: regular     Special Instructions: None    · Is patient discharged on Warfarin / Coumadin?   No     Depression / Suicide Risk    As you are discharged from this [facility] facility, it is important to learn how to keep safe from harming yourself.    Recognize the warning signs:  · Abrupt changes in personality, positive or negative- including increase in energy   · Giving away possessions  · Change in eating patterns- significant weight changes-  positive or negative  · Change in sleeping patterns- unable to sleep or sleeping all the time   · Unwillingness or inability to communicate  · Depression  · Unusual sadness, discouragement and loneliness  · Talk of wanting to die  · Neglect of personal appearance   · Rebelliousness- reckless behavior  · Withdrawal from people/activities they love  · Confusion- inability to concentrate     If you or a loved one observes any of these behaviors or has concerns about self-harm, here's what you can do:  · Talk about it- your feelings and reasons for harming yourself  · Remove any means that you might use to hurt yourself (examples: pills, rope, extension cords, firearm)  · Get professional help from the community  "(Mental Health, Substance Abuse, psychological counseling)  · Do not be alone:Call your Safe Contact- someone whom you trust who will be there for you.  · Call your local CRISIS HOTLINE 165-5020 or 729-985-9909  · Call your local Children's Mobile Crisis Response Team [place] (159) 810-6276 or www.CrowdClock  · Call the toll free National Suicide Prevention Hotlines   · National Suicide Prevention Lifeline 908-833-MJPS (2808)  · [place] Hope Line Network 800-SUICIDE (746-1110)    Headaches, Frequently Asked Questions  MIGRAINE HEADACHES  Q: What is migraine? What causes it? How can I treat it?  A: Generally, migraine headaches begin as a dull ache. Then they develop into a constant, throbbing, and pulsating pain. You may experience pain at the temples. You may experience pain at the front or back of one or both sides of the head. The pain is usually accompanied by a combination of:  · Nausea.   · Vomiting.   · Sensitivity to light and noise.   Some people (about 15%) experience an aura (see below) before an attack. The cause of migraine is believed to be chemical reactions in the brain. Treatment for migraine may include over-the-counter or prescription medications. It may also include self-help techniques. These include relaxation training and biofeedback.   Q: What is an aura?  A: About 15% of people with migraine get an \"aura\". This is a sign of neurological symptoms that occur before a migraine headache. You may see wavy or jagged lines, dots, or flashing lights. You might experience tunnel vision or blind spots in one or both eyes. The aura can include visual or auditory hallucinations (something imagined). It may include disruptions in smell (such as strange odors), taste or touch. Other symptoms include:  · Numbness.   · A \"pins and needles\" sensation.   · Difficulty in recalling or speaking the correct word.   These neurological events may last as long as 60 minutes. These symptoms will fade as " "the headache begins.  Q: What is a trigger?  A: Certain physical or environmental factors can lead to or \"trigger\" a migraine. These include:  · Foods.   · Hormonal changes.   · Weather.   · Stress.   It is important to remember that triggers are different for everyone. To help prevent migraine attacks, you need to figure out which triggers affect you. Keep a headache diary. This is a good way to track triggers. The diary will help you talk to your healthcare professional about your condition.  Q: Does weather affect migraines?  A: Bright sunshine, hot, humid conditions, and drastic changes in barometric pressure may lead to, or \"trigger,\" a migraine attack in some people. But studies have shown that weather does not act as a trigger for everyone with migraines.  Q: What is the link between migraine and hormones?  A: Hormones start and regulate many of your body's functions. Hormones keep your body in balance within a constantly changing environment. The levels of hormones in your body are unbalanced at times. Examples are during menstruation, pregnancy, or menopause. That can lead to a migraine attack. In fact, about three quarters of all women with migraine report that their attacks are related to the menstrual cycle.   Q: Is there an increased risk of stroke for migraine sufferers?  A: The likelihood of a migraine attack causing a stroke is very remote. That is not to say that migraine sufferers cannot have a stroke associated with their migraines. In persons under age 40, the most common associated factor for stroke is migraine headache. But over the course of a person's normal life span, the occurrence of migraine headache may actually be associated with a reduced risk of dying from cerebrovascular disease due to stroke.   Q: What are acute medications for migraine?  A: Acute medications are used to treat the pain of the headache after it has started. Examples over-the-counter medications, NSAIDs, ergots, and " "triptans.   Q: What are the triptans?  A: Triptans are the newest class of abortive medications. They are specifically targeted to treat migraine. Triptans are vasoconstrictors. They moderate some chemical reactions in the brain. The triptans work on receptors in your brain. Triptans help to restore the balance of a neurotransmitter called serotonin. Fluctuations in levels of serotonin are thought to be a main cause of migraine.   Q: Are over-the-counter medications for migraine effective?  A: Over-the-counter, or \"OTC,\" medications may be effective in relieving mild to moderate pain and associated symptoms of migraine. But you should see your caregiver before beginning any treatment regimen for migraine.   Q: What are preventive medications for migraine?  A: Preventive medications for migraine are sometimes referred to as \"prophylactic\" treatments. They are used to reduce the frequency, severity, and length of migraine attacks. Examples of preventive medications include antiepileptic medications, antidepressants, beta-blockers, calcium channel blockers, and NSAIDs (nonsteroidal anti-inflammatory drugs).  Q: Why are anticonvulsants used to treat migraine?  A: During the past few years, there has been an increased interest in antiepileptic drugs for the prevention of migraine. They are sometimes referred to as \"anticonvulsants\". Both epilepsy and migraine may be caused by similar reactions in the brain.   Q: Why are antidepressants used to treat migraine?  A: Antidepressants are typically used to treat people with depression. They may reduce migraine frequency by regulating chemical levels, such as serotonin, in the brain.   Q: What alternative therapies are used to treat migraine?  A: The term \"alternative therapies\" is often used to describe treatments considered outside the scope of conventional Western medicine. Examples of alternative therapy include acupuncture, acupressure, and yoga. Another common alternative " "treatment is herbal therapy. Some herbs are believed to relieve headache pain. Always discuss alternative therapies with your caregiver before proceeding. Some herbal products contain arsenic and other toxins.  TENSION HEADACHES  Q: What is a tension-type headache? What causes it? How can I treat it?  A: Tension-type headaches occur randomly. They are often the result of temporary stress, anxiety, fatigue, or anger. Symptoms include soreness in your temples, a tightening band-like sensation around your head (a \"vice-like\" ache). Symptoms can also include a pulling feeling, pressure sensations, and elba head and neck muscles. The headache begins in your forehead, temples, or the back of your head and neck. Treatment for tension-type headache may include over-the-counter or prescription medications. Treatment may also include self-help techniques such as relaxation training and biofeedback.  CLUSTER HEADACHES  Q: What is a cluster headache? What causes it? How can I treat it?  A: Cluster headache gets its name because the attacks come in groups. The pain arrives with little, if any, warning. It is usually on one side of the head. A tearing or bloodshot eye and a runny nose on the same side of the headache may also accompany the pain. Cluster headaches are believed to be caused by chemical reactions in the brain. They have been described as the most severe and intense of any headache type. Treatment for cluster headache includes prescription medication and oxygen.  SINUS HEADACHES  Q: What is a sinus headache? What causes it? How can I treat it?  A: When a cavity in the bones of the face and skull (a sinus) becomes inflamed, the inflammation will cause localized pain. This condition is usually the result of an allergic reaction, a tumor, or an infection. If your headache is caused by a sinus blockage, such as an infection, you will probably have a fever. An x-ray will confirm a sinus blockage. Your caregiver's " "treatment might include antibiotics for the infection, as well as antihistamines or decongestants.   REBOUND HEADACHES  Q: What is a rebound headache? What causes it? How can I treat it?  A: A pattern of taking acute headache medications too often can lead to a condition known as \"rebound headache.\" A pattern of taking too much headache medication includes taking it more than 2 days per week or in excessive amounts. That means more than the label or a caregiver advises. With rebound headaches, your medications not only stop relieving pain, they actually begin to cause headaches. Doctors treat rebound headache by tapering the medication that is being overused. Sometimes your caregiver will gradually substitute a different type of treatment or medication. Stopping may be a challenge. Regularly overusing a medication increases the potential for serious side effects. Consult a caregiver if you regularly use headache medications more than 2 days per week or more than the label advises.  ADDITIONAL QUESTIONS AND ANSWERS  Q: What is biofeedback?  A: Biofeedback is a self-help treatment. Biofeedback uses special equipment to monitor your body's involuntary physical responses. Biofeedback monitors:  · Breathing.   · Pulse.   · Heart rate.   · Temperature.   · Muscle tension.   · Brain activity.   Biofeedback helps you refine and perfect your relaxation exercises. You learn to control the physical responses that are related to stress. Once the technique has been mastered, you do not need the equipment any more.  Q: Are headaches hereditary?  A: Four out of five (80%) of people that suffer report a family history of migraine. Scientists are not sure if this is genetic or a family predisposition. Despite the uncertainty, a child has a 50% chance of having migraine if one parent suffers. The child has a 75% chance if both parents suffer.   Q: Can children get headaches?  A: By the time they reach high school, most young people " have experienced some type of headache. Many safe and effective approaches or medications can prevent a headache from occurring or stop it after it has begun.   Q: What type of doctor should I see to diagnose and treat my headache?  A: Start with your primary caregiver. Discuss his or her experience and approach to headaches. Discuss methods of classification, diagnosis, and treatment. Your caregiver may decide to recommend you to a headache specialist, depending upon your symptoms or other physical conditions. Having diabetes, allergies, etc., may require a more comprehensive and inclusive approach to your headache. The National Headache Foundation will provide, upon request, a list of NHF physician members in your state.  Document Released: 03/09/2005 Document Revised: 03/11/2013 Document Reviewed: 08/17/2009  ExitCare® Patient Information ©2013 [employer].    Acetaminophen; Butalbital; Caffeine tablets or capsules  What is this medicine?  ACETAMINOPHEN; BUTALBITAL; CAFFEINE (a set a DYLON daysi fen; byoo SERGEY bi sergey; KAF een) is a pain reliever. It is used to treat tension headaches.  This medicine may be used for other purposes; ask your health care provider or pharmacist if you have questions.  COMMON BRAND NAME(S): Alagesic, Americet, Anolor-300, Arcet, DONNA, CAPACET, Dolgic Plus, Esgic, Esgic Plus, Ezol, Fioricet, Geone, Margesic, Medigesic, Orbivan, Pacaps, Phrenilin Forte, Repan, Tenake, Triad, Zebutal  What should I tell my health care provider before I take this medicine?  They need to know if you have any of these conditions:  · drug abuse or addiction  · heart or circulation problems  · if you often drink alcohol  · kidney disease or problems going to the bathroom  · liver disease  · lung disease, asthma, or breathing problems  · porphyria  · an unusual or allergic reaction to acetaminophen, butalbital or other barbiturates, caffeine, other medicines, foods, dyes, or preservatives  · pregnant or trying to  get pregnant  · breast-feeding  How should I use this medicine?  Take this medicine by mouth with a full glass of water. Follow the directions on the prescription label. If the medicine upsets your stomach, take the medicine with food or milk. Do not take more than you are told to take.  Talk to your pediatrician regarding the use of this medicine in children. Special care may be needed.  Overdosage: If you think you have taken too much of this medicine contact a poison control center or emergency room at once.  NOTE: This medicine is only for you. Do not share this medicine with others.  What if I miss a dose?  If you miss a dose, take it as soon as you can. If it is almost time for your next dose, take only that dose. Do not take double or extra doses.  What may interact with this medicine?  · alcohol or medicines that contain alcohol  · antidepressants, especially MAOIs like isocarboxazid, phenelzine, tranylcypromine, and selegiline  · antihistamines  · benzodiazepines  · carbamazepine  · isoniazid  · medicines for pain like pentazocine, buprenorphine, butorphanol, nalbuphine, tramadol, and propoxyphene  · muscle relaxants  · naltrexone  · phenobarbital, phenytoin, and fosphenytoin  · phenothiazines like perphenazine, thioridazine, chlorpromazine, mesoridazine, fluphenazine, prochlorperazine, promazine, and trifluoperazine  · voriconazole  This list may not describe all possible interactions. Give your health care provider a list of all the medicines, herbs, non-prescription drugs, or dietary supplements you use. Also tell them if you smoke, drink alcohol, or use illegal drugs. Some items may interact with your medicine.  What should I watch for while using this medicine?  Tell your doctor or health care professional if your pain does not go away, if it gets worse, or if you have new or a different type of pain. You may develop tolerance to the medicine. Tolerance means that you will need a higher dose of the  medicine for pain relief. Tolerance is normal and is expected if you take the medicine for a long time.  Do not suddenly stop taking your medicine because you may develop a severe reaction. Your body becomes used to the medicine. This does NOT mean you are addicted. Addiction is a behavior related to getting and using a drug for a non-medical reason. If you have pain, you have a medical reason to take pain medicine. Your doctor will tell you how much medicine to take. If your doctor wants you to stop the medicine, the dose will be slowly lowered over time to avoid any side effects.  You may get drowsy or dizzy when you first start taking the medicine or change doses. Do not drive, use machinery, or do anything that may be dangerous until you know how the medicine affects you. Stand or sit up slowly.  Do not take other medicines that contain acetaminophen with this medicine. Always read labels carefully. If you have questions, ask your doctor or pharmacist.  If you take too much acetaminophen get medical help right away. Too much acetaminophen can be very dangerous and cause liver damage. Even if you do not have symptoms, it is important to get help right away.  What side effects may I notice from receiving this medicine?  Side effects that you should report to your doctor or health care professional as soon as possible:  · allergic reactions like skin rash, itching or hives, swelling of the face, lips, or tongue  · breathing problems  · confusion  · feeling faint or lightheaded, falls  · redness, blistering, peeling or loosening of the skin, including inside the mouth  · seizure  · stomach pain  · yellowing of the eyes or skin  Side effects that usually do not require medical attention (report to your doctor or health care professional if they continue or are bothersome):  · constipation  · nausea, vomiting  This list may not describe all possible side effects. Call your doctor for medical advice about side effects.  You may report side effects to FDA at 8-905-FDA-9270.  Where should I keep my medicine?  Keep out of the reach of children. This medicine can be abused. Keep your medicine in a safe place to protect it from theft. Do not share this medicine with anyone. Selling or giving away this medicine is dangerous and against the law.  This medicine may cause accidental overdose and death if it taken by other adults, children, or pets. Mix any unused medicine with a substance like cat litter or coffee grounds. Then throw the medicine away in a sealed container like a sealed bag or a coffee can with a lid. Do not use the medicine after the expiration date.  Store at room temperature between 15 and 30 degrees C (59 and 86 degrees F).  NOTE: This sheet is a summary. It may not cover all possible information. If you have questions about this medicine, talk to your doctor, pharmacist, or health care provider.  © 2020 Elsevier/Gold Standard (2015-02-13 15:00:25)

## 2020-07-17 NOTE — PROGRESS NOTES
Cross Cover Medicine Note    Page about chest pain. On exam, the patient said she started having 7 out of 10 left-sided sharp intermittent chest pain with radiation to left armpit with associated shortness of breath. She denied palpitations or prior episodes. Vitals stable. Saturating 96% room air. Tenderness was reproducible with chest palpation. There was no calf tenderness, Homans sign, or leg swelling. Stat EKG was ordered which showed normal sinus rhythm without ischemic changes. Patient also meets none of PERC criteria. She will be given one-time acetaminophen for pain. This was discussed with my senior resident, the nurse, and the patient at the bedside. I will continue to monitor the patient.
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
## 2020-07-18 NOTE — PROGRESS NOTES
Patient is A&Ox4. Discharge instructions. Personal belongings in possession with patient. PIV and tele monitor removed. Copy of discharge instructions in patient chart, signed and reviewed. Patient verbalizes the understanding of the discharge instructions. Questions and concerns addressed prior to leaving the unit. Transported via walking by self. Patient discharged to home. Friend present.

## 2020-07-29 ENCOUNTER — APPOINTMENT (OUTPATIENT)
Dept: RADIOLOGY | Facility: MEDICAL CENTER | Age: 25
End: 2020-07-29
Attending: FAMILY MEDICINE

## 2020-07-29 DIAGNOSIS — G93.89 BRAIN MASS: ICD-10-CM

## 2020-07-29 PROCEDURE — 70544 MR ANGIOGRAPHY HEAD W/O DYE: CPT

## 2020-09-01 ENCOUNTER — APPOINTMENT (OUTPATIENT)
Dept: RADIOLOGY | Facility: MEDICAL CENTER | Age: 25
DRG: 101 | End: 2020-09-01
Attending: PSYCHIATRY & NEUROLOGY
Payer: COMMERCIAL

## 2020-09-01 ENCOUNTER — APPOINTMENT (OUTPATIENT)
Dept: RADIOLOGY | Facility: MEDICAL CENTER | Age: 25
DRG: 101 | End: 2020-09-01
Attending: EMERGENCY MEDICINE
Payer: COMMERCIAL

## 2020-09-01 ENCOUNTER — HOSPITAL ENCOUNTER (INPATIENT)
Facility: MEDICAL CENTER | Age: 25
LOS: 2 days | DRG: 101 | End: 2020-09-03
Attending: EMERGENCY MEDICINE | Admitting: HOSPITALIST
Payer: COMMERCIAL

## 2020-09-01 LAB
ABO GROUP BLD: NORMAL
ALBUMIN SERPL BCP-MCNC: 4 G/DL (ref 3.2–4.9)
ALBUMIN/GLOB SERPL: 1.3 G/DL
ALP SERPL-CCNC: 67 U/L (ref 30–99)
ALT SERPL-CCNC: 20 U/L (ref 2–50)
ANION GAP SERPL CALC-SCNC: 14 MMOL/L (ref 7–16)
APTT PPP: 28.1 SEC (ref 24.7–36)
AST SERPL-CCNC: 18 U/L (ref 12–45)
BASOPHILS # BLD AUTO: 0.5 % (ref 0–1.8)
BASOPHILS # BLD: 0.03 K/UL (ref 0–0.12)
BILIRUB SERPL-MCNC: <0.2 MG/DL (ref 0.1–1.5)
BLD GP AB SCN SERPL QL: NORMAL
BUN SERPL-MCNC: 10 MG/DL (ref 8–22)
CALCIUM SERPL-MCNC: 9.1 MG/DL (ref 8.5–10.5)
CHLORIDE SERPL-SCNC: 105 MMOL/L (ref 96–112)
CO2 SERPL-SCNC: 22 MMOL/L (ref 20–33)
COVID ORDER STATUS COVID19: NORMAL
CREAT SERPL-MCNC: 0.64 MG/DL (ref 0.5–1.4)
EKG IMPRESSION: NORMAL
EOSINOPHIL # BLD AUTO: 0.06 K/UL (ref 0–0.51)
EOSINOPHIL NFR BLD: 0.9 % (ref 0–6.9)
ERYTHROCYTE [DISTWIDTH] IN BLOOD BY AUTOMATED COUNT: 47.3 FL (ref 35.9–50)
GLOBULIN SER CALC-MCNC: 3 G/DL (ref 1.9–3.5)
GLUCOSE SERPL-MCNC: 99 MG/DL (ref 65–99)
HCT VFR BLD AUTO: 32.2 % (ref 37–47)
HGB BLD-MCNC: 10.1 G/DL (ref 12–16)
IMM GRANULOCYTES # BLD AUTO: 0.02 K/UL (ref 0–0.11)
IMM GRANULOCYTES NFR BLD AUTO: 0.3 % (ref 0–0.9)
INR PPP: 0.97 (ref 0.87–1.13)
LYMPHOCYTES # BLD AUTO: 2.73 K/UL (ref 1–4.8)
LYMPHOCYTES NFR BLD: 42.7 % (ref 22–41)
MCH RBC QN AUTO: 26.9 PG (ref 27–33)
MCHC RBC AUTO-ENTMCNC: 31.4 G/DL (ref 33.6–35)
MCV RBC AUTO: 85.9 FL (ref 81.4–97.8)
MONOCYTES # BLD AUTO: 0.43 K/UL (ref 0–0.85)
MONOCYTES NFR BLD AUTO: 6.7 % (ref 0–13.4)
NEUTROPHILS # BLD AUTO: 3.13 K/UL (ref 2–7.15)
NEUTROPHILS NFR BLD: 48.9 % (ref 44–72)
NRBC # BLD AUTO: 0 K/UL
NRBC BLD-RTO: 0 /100 WBC
PLATELET # BLD AUTO: 376 K/UL (ref 164–446)
PMV BLD AUTO: 10.8 FL (ref 9–12.9)
POTASSIUM SERPL-SCNC: 3.5 MMOL/L (ref 3.6–5.5)
PROT SERPL-MCNC: 7 G/DL (ref 6–8.2)
PROTHROMBIN TIME: 13.1 SEC (ref 12–14.6)
RBC # BLD AUTO: 3.75 M/UL (ref 4.2–5.4)
RH BLD: NORMAL
SODIUM SERPL-SCNC: 141 MMOL/L (ref 135–145)
TROPONIN T SERPL-MCNC: <6 NG/L (ref 6–19)
WBC # BLD AUTO: 6.4 K/UL (ref 4.8–10.8)

## 2020-09-01 PROCEDURE — 70470 CT HEAD/BRAIN W/O & W/DYE: CPT

## 2020-09-01 PROCEDURE — 700105 HCHG RX REV CODE 258: Performed by: PSYCHIATRY & NEUROLOGY

## 2020-09-01 PROCEDURE — 86901 BLOOD TYPING SEROLOGIC RH(D): CPT

## 2020-09-01 PROCEDURE — 99285 EMERGENCY DEPT VISIT HI MDM: CPT

## 2020-09-01 PROCEDURE — 96374 THER/PROPH/DIAG INJ IV PUSH: CPT

## 2020-09-01 PROCEDURE — 770006 HCHG ROOM/CARE - MED/SURG/GYN SEMI*

## 2020-09-01 PROCEDURE — 93005 ELECTROCARDIOGRAM TRACING: CPT | Performed by: EMERGENCY MEDICINE

## 2020-09-01 PROCEDURE — 96375 TX/PRO/DX INJ NEW DRUG ADDON: CPT

## 2020-09-01 PROCEDURE — 85730 THROMBOPLASTIN TIME PARTIAL: CPT

## 2020-09-01 PROCEDURE — 700111 HCHG RX REV CODE 636 W/ 250 OVERRIDE (IP): Performed by: PSYCHIATRY & NEUROLOGY

## 2020-09-01 PROCEDURE — 83735 ASSAY OF MAGNESIUM: CPT

## 2020-09-01 PROCEDURE — 85025 COMPLETE CBC W/AUTO DIFF WBC: CPT

## 2020-09-01 PROCEDURE — 85610 PROTHROMBIN TIME: CPT

## 2020-09-01 PROCEDURE — 700111 HCHG RX REV CODE 636 W/ 250 OVERRIDE (IP): Performed by: EMERGENCY MEDICINE

## 2020-09-01 PROCEDURE — 99223 1ST HOSP IP/OBS HIGH 75: CPT | Performed by: HOSPITALIST

## 2020-09-01 PROCEDURE — 84484 ASSAY OF TROPONIN QUANT: CPT

## 2020-09-01 PROCEDURE — 80053 COMPREHEN METABOLIC PANEL: CPT

## 2020-09-01 PROCEDURE — U0003 INFECTIOUS AGENT DETECTION BY NUCLEIC ACID (DNA OR RNA); SEVERE ACUTE RESPIRATORY SYNDROME CORONAVIRUS 2 (SARS-COV-2) (CORONAVIRUS DISEASE [COVID-19]), AMPLIFIED PROBE TECHNIQUE, MAKING USE OF HIGH THROUGHPUT TECHNOLOGIES AS DESCRIBED BY CMS-2020-01-R: HCPCS

## 2020-09-01 PROCEDURE — 700117 HCHG RX CONTRAST REV CODE 255: Performed by: EMERGENCY MEDICINE

## 2020-09-01 PROCEDURE — C9803 HOPD COVID-19 SPEC COLLECT: HCPCS | Performed by: EMERGENCY MEDICINE

## 2020-09-01 PROCEDURE — 86900 BLOOD TYPING SEROLOGIC ABO: CPT

## 2020-09-01 PROCEDURE — 36415 COLL VENOUS BLD VENIPUNCTURE: CPT

## 2020-09-01 PROCEDURE — 86850 RBC ANTIBODY SCREEN: CPT

## 2020-09-01 PROCEDURE — 71045 X-RAY EXAM CHEST 1 VIEW: CPT

## 2020-09-01 RX ORDER — PROCHLORPERAZINE EDISYLATE 5 MG/ML
5-10 INJECTION INTRAMUSCULAR; INTRAVENOUS EVERY 4 HOURS PRN
Status: DISCONTINUED | OUTPATIENT
Start: 2020-09-01 | End: 2020-09-03 | Stop reason: HOSPADM

## 2020-09-01 RX ORDER — ACETAMINOPHEN 325 MG/1
650 TABLET ORAL EVERY 6 HOURS PRN
Status: DISCONTINUED | OUTPATIENT
Start: 2020-09-01 | End: 2020-09-03 | Stop reason: HOSPADM

## 2020-09-01 RX ORDER — LEVETIRACETAM 500 MG/1
1000 TABLET ORAL 2 TIMES DAILY
Status: DISCONTINUED | OUTPATIENT
Start: 2020-09-02 | End: 2020-09-02

## 2020-09-01 RX ORDER — ENALAPRILAT 1.25 MG/ML
1.25 INJECTION INTRAVENOUS EVERY 6 HOURS PRN
Status: DISCONTINUED | OUTPATIENT
Start: 2020-09-01 | End: 2020-09-03 | Stop reason: HOSPADM

## 2020-09-01 RX ORDER — PROMETHAZINE HYDROCHLORIDE 25 MG/1
12.5-25 SUPPOSITORY RECTAL EVERY 4 HOURS PRN
Status: DISCONTINUED | OUTPATIENT
Start: 2020-09-01 | End: 2020-09-03 | Stop reason: HOSPADM

## 2020-09-01 RX ORDER — BISACODYL 10 MG
10 SUPPOSITORY, RECTAL RECTAL
Status: DISCONTINUED | OUTPATIENT
Start: 2020-09-01 | End: 2020-09-03 | Stop reason: HOSPADM

## 2020-09-01 RX ORDER — CLONAZEPAM 1 MG/1
2 TABLET ORAL NIGHTLY PRN
Status: DISCONTINUED | OUTPATIENT
Start: 2020-09-01 | End: 2020-09-03 | Stop reason: HOSPADM

## 2020-09-01 RX ORDER — POTASSIUM CHLORIDE 20 MEQ/1
40 TABLET, EXTENDED RELEASE ORAL 2 TIMES DAILY
Status: DISCONTINUED | OUTPATIENT
Start: 2020-09-02 | End: 2020-09-02

## 2020-09-01 RX ORDER — LORAZEPAM 2 MG/ML
4 INJECTION INTRAMUSCULAR
Status: DISCONTINUED | OUTPATIENT
Start: 2020-09-01 | End: 2020-09-02

## 2020-09-01 RX ORDER — ONDANSETRON 4 MG/1
4 TABLET, ORALLY DISINTEGRATING ORAL EVERY 4 HOURS PRN
Status: DISCONTINUED | OUTPATIENT
Start: 2020-09-01 | End: 2020-09-03 | Stop reason: HOSPADM

## 2020-09-01 RX ORDER — IPRATROPIUM BROMIDE AND ALBUTEROL SULFATE 2.5; .5 MG/3ML; MG/3ML
3 SOLUTION RESPIRATORY (INHALATION)
Status: DISCONTINUED | OUTPATIENT
Start: 2020-09-01 | End: 2020-09-03 | Stop reason: HOSPADM

## 2020-09-01 RX ORDER — PROMETHAZINE HYDROCHLORIDE 25 MG/1
12.5-25 TABLET ORAL EVERY 4 HOURS PRN
Status: DISCONTINUED | OUTPATIENT
Start: 2020-09-01 | End: 2020-09-03 | Stop reason: HOSPADM

## 2020-09-01 RX ORDER — POLYETHYLENE GLYCOL 3350 17 G/17G
1 POWDER, FOR SOLUTION ORAL
Status: DISCONTINUED | OUTPATIENT
Start: 2020-09-01 | End: 2020-09-03 | Stop reason: HOSPADM

## 2020-09-01 RX ORDER — ONDANSETRON 2 MG/ML
4 INJECTION INTRAMUSCULAR; INTRAVENOUS EVERY 4 HOURS PRN
Status: DISCONTINUED | OUTPATIENT
Start: 2020-09-01 | End: 2020-09-03 | Stop reason: HOSPADM

## 2020-09-01 RX ORDER — AMOXICILLIN 250 MG
2 CAPSULE ORAL 2 TIMES DAILY
Status: DISCONTINUED | OUTPATIENT
Start: 2020-09-02 | End: 2020-09-03 | Stop reason: HOSPADM

## 2020-09-01 RX ADMIN — SODIUM CHLORIDE 2000 MG: 9 INJECTION, SOLUTION INTRAVENOUS at 23:01

## 2020-09-01 RX ADMIN — FENTANYL CITRATE 50 MCG: 50 INJECTION INTRAMUSCULAR; INTRAVENOUS at 23:17

## 2020-09-01 RX ADMIN — IOHEXOL 50 ML: 350 INJECTION, SOLUTION INTRAVENOUS at 22:33

## 2020-09-01 ASSESSMENT — FIBROSIS 4 INDEX: FIB4 SCORE: 0.35

## 2020-09-02 ENCOUNTER — APPOINTMENT (OUTPATIENT)
Dept: RADIOLOGY | Facility: MEDICAL CENTER | Age: 25
DRG: 101 | End: 2020-09-02
Attending: PSYCHIATRY & NEUROLOGY
Payer: COMMERCIAL

## 2020-09-02 PROBLEM — D64.9 ANEMIA: Status: ACTIVE | Noted: 2020-09-02

## 2020-09-02 PROBLEM — E87.6 HYPOKALEMIA: Status: ACTIVE | Noted: 2020-09-02

## 2020-09-02 LAB
ABO + RH BLD: NORMAL
AMMONIA PLAS-SCNC: 31 UMOL/L (ref 11–45)
AMPHET UR QL SCN: NEGATIVE
APPEARANCE UR: CLEAR
BARBITURATES UR QL SCN: NEGATIVE
BENZODIAZ UR QL SCN: POSITIVE
BILIRUB UR QL STRIP.AUTO: NEGATIVE
BZE UR QL SCN: NEGATIVE
CANNABINOIDS UR QL SCN: NEGATIVE
COLOR UR: YELLOW
GLUCOSE UR STRIP.AUTO-MCNC: NEGATIVE MG/DL
KETONES UR STRIP.AUTO-MCNC: NEGATIVE MG/DL
LEUKOCYTE ESTERASE UR QL STRIP.AUTO: NEGATIVE
MAGNESIUM SERPL-MCNC: 2 MG/DL (ref 1.5–2.5)
METHADONE UR QL SCN: NEGATIVE
MICRO URNS: NORMAL
NITRITE UR QL STRIP.AUTO: NEGATIVE
OPIATES UR QL SCN: NEGATIVE
OXYCODONE UR QL SCN: NEGATIVE
PCP UR QL SCN: NEGATIVE
PH UR STRIP.AUTO: 7 [PH] (ref 5–8)
POTASSIUM SERPL-SCNC: 3.9 MMOL/L (ref 3.6–5.5)
PROPOXYPH UR QL SCN: NEGATIVE
PROT UR QL STRIP: NEGATIVE MG/DL
RBC UR QL AUTO: NEGATIVE
SARS-COV-2 RNA RESP QL NAA+PROBE: NOTDETECTED
SP GR UR STRIP.AUTO: 1.02
SPECIMEN SOURCE: NORMAL
UROBILINOGEN UR STRIP.AUTO-MCNC: 1 MG/DL
VIT B12 SERPL-MCNC: 455 PG/ML (ref 211–911)

## 2020-09-02 PROCEDURE — 36415 COLL VENOUS BLD VENIPUNCTURE: CPT

## 2020-09-02 PROCEDURE — 700102 HCHG RX REV CODE 250 W/ 637 OVERRIDE(OP): Performed by: HOSPITALIST

## 2020-09-02 PROCEDURE — 95720 EEG PHY/QHP EA INCR W/VEEG: CPT | Performed by: PSYCHIATRY & NEUROLOGY

## 2020-09-02 PROCEDURE — 95819 EEG AWAKE AND ASLEEP: CPT | Performed by: PSYCHIATRY & NEUROLOGY

## 2020-09-02 PROCEDURE — 84132 ASSAY OF SERUM POTASSIUM: CPT

## 2020-09-02 PROCEDURE — 700111 HCHG RX REV CODE 636 W/ 250 OVERRIDE (IP): Performed by: HOSPITALIST

## 2020-09-02 PROCEDURE — A9270 NON-COVERED ITEM OR SERVICE: HCPCS | Performed by: HOSPITALIST

## 2020-09-02 PROCEDURE — 80307 DRUG TEST PRSMV CHEM ANLYZR: CPT

## 2020-09-02 PROCEDURE — 700117 HCHG RX CONTRAST REV CODE 255: Performed by: PSYCHIATRY & NEUROLOGY

## 2020-09-02 PROCEDURE — 99223 1ST HOSP IP/OBS HIGH 75: CPT | Mod: 25 | Performed by: PSYCHIATRY & NEUROLOGY

## 2020-09-02 PROCEDURE — 95700 EEG CONT REC W/VID EEG TECH: CPT | Performed by: PSYCHIATRY & NEUROLOGY

## 2020-09-02 PROCEDURE — 82140 ASSAY OF AMMONIA: CPT

## 2020-09-02 PROCEDURE — 99232 SBSQ HOSP IP/OBS MODERATE 35: CPT | Performed by: HOSPITALIST

## 2020-09-02 PROCEDURE — 95714 VEEG EA 12-26 HR UNMNTR: CPT | Performed by: PSYCHIATRY & NEUROLOGY

## 2020-09-02 PROCEDURE — 82607 VITAMIN B-12: CPT

## 2020-09-02 PROCEDURE — 81003 URINALYSIS AUTO W/O SCOPE: CPT

## 2020-09-02 PROCEDURE — 70553 MRI BRAIN STEM W/O & W/DYE: CPT

## 2020-09-02 PROCEDURE — 4A00X4Z MEASUREMENT OF CENTRAL NERVOUS ELECTRICAL ACTIVITY, EXTERNAL APPROACH: ICD-10-PCS | Performed by: PSYCHIATRY & NEUROLOGY

## 2020-09-02 PROCEDURE — 700111 HCHG RX REV CODE 636 W/ 250 OVERRIDE (IP): Performed by: PSYCHIATRY & NEUROLOGY

## 2020-09-02 PROCEDURE — A9576 INJ PROHANCE MULTIPACK: HCPCS | Performed by: PSYCHIATRY & NEUROLOGY

## 2020-09-02 PROCEDURE — 700111 HCHG RX REV CODE 636 W/ 250 OVERRIDE (IP): Performed by: NURSE PRACTITIONER

## 2020-09-02 PROCEDURE — 84425 ASSAY OF VITAMIN B-1: CPT

## 2020-09-02 PROCEDURE — 770020 HCHG ROOM/CARE - TELE (206)

## 2020-09-02 RX ORDER — LEVETIRACETAM 10 MG/ML
1000 INJECTION INTRAVASCULAR EVERY 12 HOURS
Status: DISCONTINUED | OUTPATIENT
Start: 2020-09-02 | End: 2020-09-02

## 2020-09-02 RX ORDER — BUTALBITAL, ACETAMINOPHEN AND CAFFEINE 50; 325; 40 MG/1; MG/1; MG/1
1 TABLET ORAL EVERY 6 HOURS PRN
Status: DISCONTINUED | OUTPATIENT
Start: 2020-09-02 | End: 2020-09-03 | Stop reason: HOSPADM

## 2020-09-02 RX ORDER — LORAZEPAM 2 MG/ML
2 INJECTION INTRAMUSCULAR
Status: DISCONTINUED | OUTPATIENT
Start: 2020-09-02 | End: 2020-09-02

## 2020-09-02 RX ORDER — POTASSIUM CHLORIDE 7.45 MG/ML
10 INJECTION INTRAVENOUS
Status: DISPENSED | OUTPATIENT
Start: 2020-09-02 | End: 2020-09-02

## 2020-09-02 RX ADMIN — ONDANSETRON 4 MG: 4 TABLET, ORALLY DISINTEGRATING ORAL at 23:05

## 2020-09-02 RX ADMIN — ACETAMINOPHEN 650 MG: 325 TABLET, FILM COATED ORAL at 15:38

## 2020-09-02 RX ADMIN — LEVETIRACETAM INJECTION 1000 MG: 10 INJECTION INTRAVENOUS at 18:04

## 2020-09-02 RX ADMIN — BUTALBITAL, ACETAMINOPHEN, AND CAFFEINE 1 TABLET: 50; 325; 40 TABLET ORAL at 20:09

## 2020-09-02 RX ADMIN — ACETAMINOPHEN 650 MG: 325 TABLET, FILM COATED ORAL at 00:52

## 2020-09-02 RX ADMIN — POTASSIUM CHLORIDE 10 MEQ: 7.46 INJECTION, SOLUTION INTRAVENOUS at 03:07

## 2020-09-02 RX ADMIN — ENOXAPARIN SODIUM 40 MG: 40 INJECTION SUBCUTANEOUS at 05:19

## 2020-09-02 RX ADMIN — LORAZEPAM 4 MG: 2 INJECTION INTRAMUSCULAR; INTRAVENOUS at 15:46

## 2020-09-02 RX ADMIN — CLONAZEPAM 2 MG: 1 TABLET ORAL at 23:05

## 2020-09-02 RX ADMIN — GADOTERIDOL 10 ML: 279.3 INJECTION, SOLUTION INTRAVENOUS at 11:27

## 2020-09-02 RX ADMIN — LORAZEPAM 4 MG: 2 INJECTION INTRAMUSCULAR; INTRAVENOUS at 09:36

## 2020-09-02 RX ADMIN — POTASSIUM CHLORIDE 10 MEQ: 7.46 INJECTION, SOLUTION INTRAVENOUS at 05:18

## 2020-09-02 RX ADMIN — ONDANSETRON 4 MG: 2 INJECTION INTRAMUSCULAR; INTRAVENOUS at 09:51

## 2020-09-02 RX ADMIN — ACETAMINOPHEN 650 MG: 325 TABLET, FILM COATED ORAL at 09:01

## 2020-09-02 RX ADMIN — ONDANSETRON 4 MG: 2 INJECTION INTRAMUSCULAR; INTRAVENOUS at 16:07

## 2020-09-02 RX ADMIN — LEVETIRACETAM INJECTION 1000 MG: 10 INJECTION INTRAVENOUS at 05:10

## 2020-09-02 RX ADMIN — LORAZEPAM 4 MG: 2 INJECTION INTRAMUSCULAR; INTRAVENOUS at 16:23

## 2020-09-02 SDOH — HEALTH STABILITY: MENTAL HEALTH: HOW MANY STANDARD DRINKS CONTAINING ALCOHOL DO YOU HAVE ON A TYPICAL DAY?: 3 OR 4

## 2020-09-02 SDOH — HEALTH STABILITY: MENTAL HEALTH: HOW OFTEN DO YOU HAVE A DRINK CONTAINING ALCOHOL?: 2-3 TIMES A WEEK

## 2020-09-02 ASSESSMENT — ENCOUNTER SYMPTOMS
DIARRHEA: 0
MEMORY LOSS: 1
SENSORY CHANGE: 1
SHORTNESS OF BREATH: 0
HEADACHES: 1
STRIDOR: 0
SORE THROAT: 0
VOMITING: 0
LOSS OF CONSCIOUSNESS: 1
CHILLS: 0
PALPITATIONS: 0
NERVOUS/ANXIOUS: 0
HEADACHES: 0
BACK PAIN: 1
FOCAL WEAKNESS: 1
SEIZURES: 1
BLURRED VISION: 0
NAUSEA: 0
MYALGIAS: 0
ABDOMINAL PAIN: 0
COUGH: 0
SPEECH CHANGE: 0
FEVER: 0
EYE DISCHARGE: 0
NERVOUS/ANXIOUS: 1
FALLS: 0
DIZZINESS: 0
WEIGHT LOSS: 0

## 2020-09-02 ASSESSMENT — LIFESTYLE VARIABLES
HAVE PEOPLE ANNOYED YOU BY CRITICIZING YOUR DRINKING: NO
DOES PATIENT WANT TO STOP DRINKING: NO
EVER HAD A DRINK FIRST THING IN THE MORNING TO STEADY YOUR NERVES TO GET RID OF A HANGOVER: NO
CONSUMPTION TOTAL: NEGATIVE
TOTAL SCORE: 0
AVERAGE NUMBER OF DAYS PER WEEK YOU HAVE A DRINK CONTAINING ALCOHOL: 0
ALCOHOL_USE: NO
TOTAL SCORE: 0
SUBSTANCE_ABUSE: 0
TOTAL SCORE: 0
HOW MANY TIMES IN THE PAST YEAR HAVE YOU HAD 5 OR MORE DRINKS IN A DAY: 0
ON A TYPICAL DAY WHEN YOU DRINK ALCOHOL HOW MANY DRINKS DO YOU HAVE: 0
EVER FELT BAD OR GUILTY ABOUT YOUR DRINKING: NO
HAVE YOU EVER FELT YOU SHOULD CUT DOWN ON YOUR DRINKING: NO

## 2020-09-02 ASSESSMENT — COGNITIVE AND FUNCTIONAL STATUS - GENERAL
MOBILITY SCORE: 23
SUGGESTED CMS G CODE MODIFIER DAILY ACTIVITY: CH
WALKING IN HOSPITAL ROOM: A LITTLE
SUGGESTED CMS G CODE MODIFIER MOBILITY: CI
DAILY ACTIVITIY SCORE: 24

## 2020-09-02 ASSESSMENT — PATIENT HEALTH QUESTIONNAIRE - PHQ9
1. LITTLE INTEREST OR PLEASURE IN DOING THINGS: NOT AT ALL
SUM OF ALL RESPONSES TO PHQ9 QUESTIONS 1 AND 2: 0
2. FEELING DOWN, DEPRESSED, IRRITABLE, OR HOPELESS: NOT AT ALL

## 2020-09-02 ASSESSMENT — PAIN DESCRIPTION - PAIN TYPE
TYPE: ACUTE PAIN

## 2020-09-02 NOTE — CARE PLAN
Problem: Communication  Goal: The ability to communicate needs accurately and effectively will improve  Outcome: PROGRESSING AS EXPECTED  Intervention: Educate patient and significant other/support system about the plan of care, procedures, treatments, medications and allow for questions  Flowsheets (Taken 9/2/2020 0209)  Pt & Family Have Been Educated on Methods Available to Report Concerns Related to Care, Treatment, Services, and Patient Safety Issues: Yes  Note: Pt educated on POC and states understanding of teaching.        Problem: Safety  Goal: Will remain free from injury  Outcome: PROGRESSING AS EXPECTED  Goal: Will remain free from falls  Outcome: PROGRESSING AS EXPECTED  Intervention: Implement fall precautions  Flowsheets (Taken 9/2/2020 0209)  Bed Alarm: Yes - Alarm On  Note: Pt educated on fall risk precautions, pt states understanding of education and agrees to interventions.        Problem: Infection  Goal: Will remain free from infection  Outcome: PROGRESSING AS EXPECTED  Intervention: Assess signs and symptoms of infection  Note: Pt educated on s/s of infection and infection prevention.

## 2020-09-02 NOTE — PROGRESS NOTES
3rd seizure [id number],        Unconscious, convulsions, muscle rigidity, repetitive jerky movements, dry heaves, right posterior tongue bit-red and swollen.        no ativan given.       Post ictal is nauseated, dry heaving, dizzy.     . 97% on 1L.

## 2020-09-02 NOTE — PROGRESS NOTES
Seizure at 1544 -1545     during seizure, no events per monitor room.     Ativan given at 1346 on intiation of 2nd seizure. Stopped 30 seconds after administration.      Difficult to arouse but following commands post-ictal HR 76 O2 97% on RA.    No vomiting or loss of continence.

## 2020-09-02 NOTE — ED TRIAGE NOTES
Sadia Troncoso  24 y.o. female  Chief Complaint   Patient presents with   • Possible Stroke     Pt has a known r. cerebellar mass. Today pt had a seizure at 2125 and was given 5mg IM versed by EMS. The pt has had increasing l. sided weakness, however following today's seizure she now has increased weakness, l. sided sensation loss, and unequal smile. Pt presents GCS 15. VSS. Stroke alert called on arrival to ED.       Pt is alert and oriented, speaking in full sentences, follows commands and responds appropriately to questions. Resp are even and unlabored. No behavioral indicators of pain. This RN masked and in appropriate PPE during encounter. Pt in CT with Carmine MESSINA, report to Gina MESSINA.     Stroke Pre Alert Time: 2200  Vitals PTA: /73  FSBS PTA: 187  Deficits PTA: L. Sided weakness, l. Sided sensation loss, unequal smile (smile symmetrical at this time), unequal .  On set or Last Seen Normal: 2125  A&O on Arrival: x4  GCS: 15  On arrival deficits: NIH 5

## 2020-09-02 NOTE — PROGRESS NOTES
[facility] Daily Progress Note    Date of Service  9/2/2020    Chief Complaint  Seizure    Hospital Course    24 y.o. female admitted 9/1/2020 with concern of seizure and left leg weakness/numbness.  She was initially seen in the emergency room with a concern of possible stroke however she was not a candidate for alteplase.  The patient had mentioned having a intracranial cerebellar mass and states she has follow-up with a neurosurgeon at [place].  MRIs here at the hospital have not shown cerebellar mass.  Here the patient has had recurrent seizures, despite Ativan and Keppra.  MRI brain here unremarkable for acute findings.      Interval Problem Update  Patient has had recurrent episodes of seizures despite medications.  MRI of the brain was unremarkable.  Neurology is consulting.  Patient is alert and awake but states she has had left leg weakness with numbness.  Patient states she did pass out but was caught by her  did not fall and hit herself outpatient.  Denies any trauma.    Consultants/Specialty  Neurology    Code Status  Full Code    Disposition  Monitor on neurology  home in the near future once cleared by neurology    Review of Systems  Review of Systems   Constitutional: Negative for chills, fever and weight loss.   HENT: Negative for sore throat.    Eyes: Negative for discharge.   Respiratory: Negative for cough, shortness of breath and stridor.    Cardiovascular: Negative for chest pain, palpitations and leg swelling.   Gastrointestinal: Negative for abdominal pain, diarrhea and nausea.   Genitourinary: Negative for dysuria and hematuria.   Musculoskeletal: Positive for back pain. Negative for falls and joint pain.   Skin: Negative for rash.   Neurological: Positive for seizures. Negative for dizziness, speech change and headaches.   Psychiatric/Behavioral: Negative for substance abuse. The patient is not nervous/anxious.         Physical Exam  Temp:  [36.3 °C (97.4 °F)-36.9 °C (98.4  "°F)] 36.6 °C (97.8 °F)  Pulse:  [68-94] 94  Resp:  [15-24] 16  BP: ()/(52-69) 95/64  SpO2:  [94 %-100 %] 99 %    Physical Exam  Vitals signs reviewed.   Constitutional:       Appearance: Normal appearance. She is not diaphoretic.   HENT:      Head: Normocephalic and atraumatic.      Nose: Nose normal.      Mouth/Throat:      Mouth: Mucous membranes are moist.      Pharynx: No oropharyngeal exudate.   Eyes:      General: No scleral icterus.        Right eye: No discharge.         Left eye: No discharge.      Extraocular Movements: Extraocular movements intact.      Conjunctiva/sclera: Conjunctivae normal.      Pupils: Pupils are equal, round, and reactive to light.   Neck:      Musculoskeletal: No muscular tenderness.      Vascular: No carotid bruit.   Cardiovascular:      Rate and Rhythm: Normal rate and regular rhythm.      Pulses:           Radial pulses are 2+ on the right side and 2+ on the left side.        Dorsalis pedis pulses are 2+ on the right side and 2+ on the left side.      Heart sounds: No murmur.   Pulmonary:      Effort: Pulmonary effort is normal. No respiratory distress.      Breath sounds: Normal breath sounds. No wheezing or rales.   Abdominal:      General: Bowel sounds are normal. There is no distension.      Palpations: Abdomen is soft.   Musculoskeletal:         General: No swelling or tenderness.      Right lower leg: No edema.      Left lower leg: No edema.   Lymphadenopathy:      Cervical: No cervical adenopathy.   Skin:     Coloration: Skin is not jaundiced or pale.   Neurological:      General: No focal deficit present.      Mental Status: She is alert and oriented to person, place, and time. Mental status is at baseline.      Cranial Nerves: No cranial nerve deficit.      Sensory: Sensory deficit (left leg \"entire leg from waist down is numb\") present.      Motor: Weakness (left leg) present.   Psychiatric:         Mood and Affect: Mood normal.         Behavior: Behavior normal. "         Fluids  No intake or output data in the 24 hours ending 09/02/20 1827    Laboratory  Recent Labs     09/01/20 2210   WBC 6.4   RBC 3.75*   HEMOGLOBIN 10.1*   HEMATOCRIT 32.2*   MCV 85.9   MCH 26.9*   MCHC 31.4*   RDW 47.3   PLATELETCT 376   MPV 10.8     Recent Labs     09/01/20 2210 09/02/20  0822   SODIUM 141  --    POTASSIUM 3.5* 3.9   CHLORIDE 105  --    CO2 22  --    GLUCOSE 99  --    BUN 10  --    CREATININE 0.64  --    CALCIUM 9.1  --      Recent Labs     09/01/20 2210   APTT 28.1   INR 0.97               Imaging  MR-BRAIN-WITH & W/O   Final Result      MRI of the brain without and with contrast within normal limits.      DX-CHEST-PORTABLE (1 VIEW)   Final Result         1.  No acute cardiopulmonary disease.      CT-HEAD WITH & W/O   Final Result         1.  No acute intracranial abnormality.           Assessment/Plan  * Seizure disorder (HCC)- (present on admission)  Assessment & Plan  MRI brain unremarkable  MRA showing a right anterior inferior cerebellar vascular loop near the seventh and eighth exit of the cranial nerves  Neurochecks  Keppra 1000 mg twice daily  Ativan as needed  EEG pending  Neurology consult  Drug screen unremarkable, ammonia level normal, thyroid studies back in July this year were unremarkable    Loss of sensation- (present on admission)  Assessment & Plan  States in the left leg below the waist  Questionable nerve impingement  Consider MRI spine    Anemia  Assessment & Plan  No sign of acute blood loss  9/1/2020: Hemoglobin 10.1    Weakness- (present on admission)  Assessment & Plan  Left leg.  Inconsistent physical exam findings.  Neurology consulting  States she has low back pain consider MRI spine    Hypokalemia- (present on admission)  Assessment & Plan  Improved with replacement continue to monitor       VTE prophylaxis: enoxaparin

## 2020-09-02 NOTE — ED PROVIDER NOTES
ED Provider Note    CHIEF COMPLAINT  Chief Complaint   Patient presents with   • Possible Stroke     Pt has a known r. cerebellar mass. Today pt had a seizure at 2125 and was given 5mg IM versed by EMS. The pt has had increasing l. sided weakness, however following today's seizure she now has increased weakness, l. sided sensation loss, and unequal smile. Pt presents GCS 15. VSS. Stroke alert called on arrival to ED.       HPI  Sadia Troncoso is a 24 y.o. female who presents with multiple seizure events with residual left-sided weakness.  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  She typically takes clonazepam for this.  She is followed by Dr. Gonzales from neurosurgery locally and was referred to a [place] neurosurgeon due to the location of the tumor.  She states that she was first diagnosed with a brain mass about 6 months ago.    She states that she also has left-sided numbness and weakness at baseline however after her multiple seizure events today this appears to be more pronounced.  No difficulty with speaking or mentation.    The patient significant other at bedside states that she had approximately 6 episodes of seizure activity lasting about 30 seconds to a minute in duration with a pause of about 1 minute when the patient appeared to be postictal.  Total episode lasting about 10 minutes.    REVIEW OF SYSTEMS  See HPI for further details. All other systems are negative.     PAST MEDICAL HISTORY   has a past medical history of Anxiety, Seizure disorder (HCC), and Seizures (HCC).    SOCIAL HISTORY  Social History     Tobacco Use   • Smoking status: Current Some Day Smoker   • Smokeless tobacco: Never Used   Substance and Sexual Activity   • Alcohol use: Yes     Frequency: Monthly or less     Binge frequency: Less than monthly   • Drug use: No   • Sexual activity: Not on file       SURGICAL HISTORY  patient denies any surgical history    CURRENT  MEDICATIONS  Home Medications     Reviewed by Sangita Flood R.N. (Registered Nurse) on 09/01/20 at 2217  Med List Status: Not Addressed   Medication Last Dose Status   clonazepam (KLONOPIN) 2 MG tablet  Active   ipratropium-albuterol (DUONEB) 0.5-2.5 (3) MG/3ML nebulizer solution  Active                ALLERGIES  No Known Allergies    PHYSICAL EXAM  VITAL SIGNS: BP (!) 99/62 Comment: Rn notified   Pulse 72   Temp 36.4 °C (97.5 °F) (Temporal)   Resp 16   Ht 1.524 m (5')   Wt 61.2 kg (135 lb)   SpO2 96%   BMI 26.37 kg/m²   Pulse ox interpretation: I interpret this pulse ox as normal.  Constitutional: Alert in no apparent distress.  HENT: No signs of trauma, Bilateral external ears normal, Nose normal.   Eyes: Pupils are equal and reactive, Conjunctiva normal, Non-icteric.   Neck: Normal range of motion, No tenderness, Supple, No stridor.   Cardiovascular: Regular rate and rhythm.   Thorax & Lungs: Normal breath sounds, No respiratory distress, No wheezing, No chest tenderness.   Abdomen: Bowel sounds normal, Soft, No tenderness, No masses, No pulsatile masses. No peritoneal signs.  Skin: Warm, Dry, No erythema, No rash.   Extremities: Intact distal pulses, No edema, No tenderness, No cyanosis  Musculoskeletal: Good range of motion in all major joints. No tenderness to palpation or major deformities noted.   Neurologic: Alert, cranial nerves II through XII grossly intact, pronator drift to the left upper extremity, unable to lift the left lower extremity off of the bed though is able to move her toes.      DIAGNOSTIC STUDIES / PROCEDURES    EKG - Physician interpretation  Results for orders placed or performed during the hospital encounter of 09/01/20   EKG (NOW)   Result Value Ref Range    Report       [facility] Emergency Dept.    Test Date:  2020-09-01  Pt Name:    JONATHAN DICKERSON                 Department: ER  MRN:        [id number]                      Room:       RD 12  Gender:      Female                       Technician: [id number]  :        1995                   Requested By:NEMO BREWER  Order #:    [id number]                    Reading MD: NEMO BREWER MD    Measurements  Intervals                                Axis  Rate:       81                           P:          1  CA:         188                          QRS:        49  QRSD:       88                           T:          38  QT:         392  QTc:        455    Interpretive Statements  SINUS RHYTHM  Compared to ECG 2020 04:32:12  No significant changes  Electronically Signed On 2020 23:11:35 PDT by NEMO BREWER MD           LABS  Labs Reviewed   CBC WITH DIFFERENTIAL    Narrative:     Indicate which anticoagulants the patient is on:->UNKNOWN   COMP METABOLIC PANEL    Narrative:     Indicate which anticoagulants the patient is on:->UNKNOWN   PROTHROMBIN TIME    Narrative:     Indicate which anticoagulants the patient is on:->UNKNOWN   APTT    Narrative:     Indicate which anticoagulants the patient is on:->UNKNOWN   COD (ADULT)   TROPONIN    Narrative:     Indicate which anticoagulants the patient is on:->UNKNOWN   URINALYSIS   URINE DRUG SCREEN         RADIOLOGY  DX-CHEST-PORTABLE (1 VIEW)   Final Result         1.  No acute cardiopulmonary disease.      CT-HEAD WITH & W/O   Final Result         1.  No acute intracranial abnormality.      MR-BRAIN-WITH & W/O    (Results Pending)         COURSE & MEDICAL DECISION MAKING    Medications   iohexol (OMNIPAQUE) 350 mg/mL (40 mL Intravenous Canceled Entry 204)   iohexol (OMNIPAQUE) 350 mg/mL (80 mL Intravenous Canceled Entry 205)   clonazePAM (KLONOPIN) tablet 2 mg (has no administration in time range)   ipratropium-albuterol (DUONEB) nebulizer solution (has no administration in time range)   senna-docusate (PERICOLACE or SENOKOT S) 8.6-50 MG per tablet 2 Tab (0 Tabs Oral Held 20 0600)     And   polyethylene glycol/lytes (MIRALAX) PACKET 1 Packet (has no  administration in time range)     And   magnesium hydroxide (MILK OF MAGNESIA) suspension 30 mL (has no administration in time range)     And   bisacodyl (DULCOLAX) suppository 10 mg (has no administration in time range)   enoxaparin (LOVENOX) inj 40 mg (has no administration in time range)   acetaminophen (TYLENOL) tablet 650 mg (650 mg Oral Given 9/2/20 0052)   enalaprilat (VASOTEC) injection 1.25 mg (has no administration in time range)   ondansetron (ZOFRAN) syringe/vial injection 4 mg (has no administration in time range)   ondansetron (ZOFRAN ODT) dispertab 4 mg (has no administration in time range)   promethazine (PHENERGAN) tablet 12.5-25 mg (has no administration in time range)   promethazine (PHENERGAN) suppository 12.5-25 mg (has no administration in time range)   prochlorperazine (COMPAZINE) injection 5-10 mg (has no administration in time range)   LORazepam (ATIVAN) injection 4 mg (has no administration in time range)   potassium chloride (KCL) ivpb 10 mEq (0 mEq Intravenous Stopped 9/2/20 0359)   levETIRAcetam (Keppra) 1000 mg in 100 mL NaCl IV premix (1,000 mg Intravenous New Bag 9/2/20 0510)   levETIRAcetam (KEPPRA) 2,000 mg in  mL IVPB (0 mg Intravenous Stopped 9/1/20 2317)   iohexol (OMNIPAQUE) 350 mg/mL (50 mL Intravenous Given 9/1/20 2233)   fentaNYL (SUBLIMAZE) injection 50 mcg (50 mcg Intravenous Given 9/1/20 2317)       Pertinent Labs & Imaging studies reviewed. (See chart for details)  24 y.o. female with a prior history of seizure activity, on clonazepam only, and a reported history of prior cerebellar right mass currently being worked up by a neurosurgeon at [place], presenting with repeated bouts of seizure-like activity lasting about 30 seconds to a minute in duration.  She is currently back to her baseline mental state however does have residual left-sided weakness and numbness.  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  Low suspicion for typical CVA as a cause of the patient's  abnormal neurologic exam.  Due to the patient's insistence that she has an intracranial mass/tumor, I do not believe that she is a candidate for alteplase.  Has an NIH stroke scale of 5.    Immediately after evaluating the patient, I did consult Dr. Bain from neurology.  Given the patient's presentation, she does not appear to be a candidate for alteplase due to known intracranial neoplasm.  Recommending CT.  Will perform with and without contrast of the head to evaluate for changes to the patient's known intracranial mass and possible brain edema.  Keppra load with 2 g followed by 1 g twice daily and will consult.    CT was unremarkable for any obvious acute abnormalities.    Spoke with the hospitalist regarding this patient's presenting symptoms.  They are agreeable to the hospitalization.  We will follow-up on neurology recommendations.      BP (!) 99/62 Comment: Rn notified   Pulse 72   Temp 36.4 °C (97.5 °F) (Temporal)   Resp 16   Ht 1.524 m (5')   Wt 61.2 kg (135 lb)   SpO2 96%   BMI 26.37 kg/m²       FINAL IMPRESSION  Seizure-like activity  Left-sided weakness and numbness      Electronically signed by: Claudy Patterson M.D., 9/1/2020 10:15 PM

## 2020-09-02 NOTE — PROCEDURES
VIDEO ELECTROENCEPHALOGRAM REPORT      Referring provider: NETTA Willis / Dr. Abraham.     DOS: 9/2/2020 (total recording of 13 hours and 44 minutes).     INDICATION:  Sadia Troncoso 24 y.o. female presenting with seizures.     CURRENT ANTIEPILEPTIC REGIMEN: Levetiracetam, Lorazepam prn. Then medications were held for the remaining of the study.     TECHNIQUE: 30 channel video electroencephalogram (EEG) was performed in accordance with the international 10-20 system. The study was reviewed in bipolar and referential montages. The recording examined the patient during wakeful and drowsy/sleep state(s).     DESCRIPTION OF THE RECORD:  During the wakefulness, the background showed a symmetrical 12 Hz alpha activity posteriorly with amplitude of 70 mV.  There was reactivity to eye closure/opening.  A normal anterior-posterior gradient was noted with faster beta frequencies seen anteriorly.  During drowsiness, theta/delta frequencies were seen.    During the sleep state, background shows diffuse high-amplitude 4-5 Hz delta activity.  Symmetrical high-amplitude sleep spindles and vertex sharps were seen in the leads over the central regions.     ACTIVATION PROCEDURES:   Not performed.    ICTAL AND/OR INTERICTAL FINDINGS:   No focal or generalized epileptiform activity noted. No regional slowing was seen during this study.  No seizures were reported or recorded during the study.     EKG: sampling of the EKG recording demonstrated sinus rhythm.     EVENTS:  ELEVEN (11) typical convulsion like spells were captured during the study, some of the events were marked by the patient herself by pushing the event button. Events consisting of patient becoming unresponsive with shaking in her extremities, right hand or both hands, pelvic thrusting, body contortion like movements. Events typically lasted about three minutes or longer, with all events being similar in nature. The patient appear post ictal after the event,  unresponsive or confused. Review of the veeg before, during and after the events demonstrated a normal awake background. ALL the events captured were PSYCHOGENIC and NON-EPILEPTIC IN NATURE.     INTERPRETATION:  This is a normal 24 hrs video EEG recording in the awake, drowsy/sleep state(s).  ELEVEN (11) typical convulsion like spells were captured during the study, some of the events were marked by the patient herself by pushing the event button. Events consisting of patient becoming unresponsive with shaking in her extremities, right hand or both hands, pelvic thrusting, body contortion like movements. Events typically lasted about three minutes or longer, with all events being similar in nature. The patient appear post ictal after the event, unresponsive or confused. Review of the veeg before, during and after the events demonstrated a normal awake background. ALL the events captured were PSYCHOGENIC and NON-EPILEPTIC IN NATURE. Clinical correlation is recommended.    Updates provided to NETTA Willis, and Dr. Abraham.       Zi Sadler MD   Epilepsy and Neurodiagnostics.   Clinical  of Neurology [facility].   Diplomate in Neurology, Epilepsy, and Electrodiagnostic Medicine.   Office: [phone]  Fax: [phone]

## 2020-09-02 NOTE — ED NOTES
"Report from Sangita MESSINA, assume care. Pt arrived from CT, per report Pt had additional seizure in CT scanner, Versed 2 mg given. Pt C?O severe HA, \"feels like I have a bullet in my head.\" Pt AAO x 4.   "

## 2020-09-02 NOTE — PROGRESS NOTES
Brief Neurology Interval    Imaging equivocal for brain mass. Pending repeat neuroimaging ie brain mri w thin cut through brainstem     B. Taya SAMS  Neurology

## 2020-09-02 NOTE — PROGRESS NOTES
Pt admitted to unit, mother at bedside- stating pt had seizure activity on way to unit- no seizure activity noted at time of arrival, VSS. Admission database completed with help of mother and patient, pt lethargic. NPO per nursing judgement d/t seizure activity and lethargic. Assessment as documented, medicated per MAR. Transitioned to IV medications for AM med pass. No seizure activity noted by this RN this shift, report given to oncoming RN.

## 2020-09-02 NOTE — H&P
Hospital Medicine History & Physical Note    Date of Service  9/1/2020    Primary Care Physician  Kishan Alanis M.D.    Consultants  Neurology Dr. Bain    Code Status  Full Code    Chief Complaint  Chief Complaint   Patient presents with   • Possible Stroke     Pt has a known r. cerebellar mass. Today pt had a seizure at 2125 and was given 5mg IM versed by EMS. The pt has had increasing l. sided weakness, however following today's seizure she now has increased weakness, l. sided sensation loss, and unequal smile. Pt presents GCS 15. VSS. Stroke alert called on arrival to ED.       History of Presenting Illness  24 y.o. female with seizure disorder treated with only clonazepam over the last few months who presented 9/1/2020 with numerous seizures tonight per her .  She does not have any recollection.  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  She had postictal Josue's paralysis so a code stroke was called.  She was not a candidate for alteplase given the possibility of an intracranial mass, and the clinical history suggestive seizure and not stroke.  She is supposed to establish and follow-up with a neurosurgeon at [place] regarding her imaging, as patient reports that physician thinks there definitely is something there and needs exploratory surgery.  Dr. Ayala did consult on the case and ordered a thin cut MRI through the brainstem.  He also started her on Keppra.      Review of Systems  Review of Systems   Constitutional: Positive for malaise/fatigue. Negative for chills and fever.   Eyes: Negative for blurred vision.   Respiratory: Negative for cough and shortness of breath.    Cardiovascular: Negative for chest pain and palpitations.   Gastrointestinal: Negative for abdominal pain, nausea and vomiting.   Genitourinary: Negative for dysuria.   Musculoskeletal: Negative for myalgias.  "  Neurological: Positive for sensory change (Left leg), focal weakness (Left leg), seizures, loss of consciousness (Does not remember event) and headaches. Negative for dizziness.   Psychiatric/Behavioral: Positive for memory loss. The patient is nervous/anxious.    All other systems reviewed and are negative.      Past Medical History   has a past medical history of Anxiety, Seizure disorder (HCC), and Seizures (HCC).    Surgical History   has no past surgical history on file.     Family History  She says her great-grandmother had epilepsy and a brain tumor  Daughter has \"vascular tumors\"    Social History   reports that she has been smoking. She has never used smokeless tobacco. She reports current alcohol use. She reports that she does not use drugs.    Allergies  No Known Allergies    Medications  Prior to Admission Medications   Prescriptions Last Dose Informant Patient Reported? Taking?   clonazepam (KLONOPIN) 2 MG tablet  Patient Yes No   Sig: Take 2 mg by mouth at bedtime as needed.   ipratropium-albuterol (DUONEB) 0.5-2.5 (3) MG/3ML nebulizer solution   Yes No   Sig: 3 mL by Nebulization route 4 times a day.      Facility-Administered Medications: None       Physical Exam  Temp:  [36.9 °C (98.4 °F)] 36.9 °C (98.4 °F)  Pulse:  [76-79] 76  Resp:  [17-24] 24  BP: ()/(55-60) 99/55  SpO2:  [99 %-100 %] 99 %    Physical Exam  Vitals signs and nursing note reviewed.   Constitutional:       Appearance: She is well-developed. She is obese.   HENT:      Head: Normocephalic and atraumatic.      Right Ear: External ear normal.      Left Ear: External ear normal.      Mouth/Throat:      Mouth: Mucous membranes are dry.   Eyes:      Extraocular Movements: Extraocular movements intact.      Pupils: Pupils are equal, round, and reactive to light.   Neck:      Musculoskeletal: No neck rigidity or muscular tenderness.   Cardiovascular:      Rate and Rhythm: Normal rate and regular rhythm.      Heart sounds: Normal " heart sounds. No murmur.   Pulmonary:      Effort: Pulmonary effort is normal. No respiratory distress.      Breath sounds: Normal breath sounds. No wheezing or rales.   Abdominal:      General: Bowel sounds are normal. There is no distension.      Palpations: Abdomen is soft.      Tenderness: There is no abdominal tenderness.   Musculoskeletal: Normal range of motion.         General: No tenderness or deformity.      Right lower leg: No edema.      Left lower leg: No edema.      Comments: Unable to move left leg   Skin:     General: Skin is warm and dry.      Findings: No erythema.   Neurological:      Mental Status: She is alert.      Comments: Oriented x4  Left patellar reflex absent   Psychiatric:         Behavior: Behavior normal.      Comments: Anxious         Laboratory:  Recent Labs     09/01/20 2210   WBC 6.4   RBC 3.75*   HEMOGLOBIN 10.1*   HEMATOCRIT 32.2*   MCV 85.9   MCH 26.9*   MCHC 31.4*   RDW 47.3   PLATELETCT 376   MPV 10.8     Recent Labs     09/01/20 2210   SODIUM 141   POTASSIUM 3.5*   CHLORIDE 105   CO2 22   GLUCOSE 99   BUN 10   CREATININE 0.64   CALCIUM 9.1     Recent Labs     09/01/20  2210   ALTSGPT 20   ASTSGOT 18   ALKPHOSPHAT 67   TBILIRUBIN <0.2   GLUCOSE 99     Recent Labs     09/01/20 2210   APTT 28.1   INR 0.97         Recent Labs     09/01/20  2210   TROPONINT <6       Imaging:  DX-CHEST-PORTABLE (1 VIEW)   Final Result         1.  No acute cardiopulmonary disease.      CT-HEAD WITH & W/O   Final Result         1.  No acute intracranial abnormality.            Assessment/Plan:  I anticipate this patient will require at least two midnights for appropriate medical management, necessitating inpatient admission.    * Seizure disorder (HCC)- (present on admission)  Assessment & Plan  With acute seizure activity x8 per  tonight  ?  Etiology, apparently the neurosurgeon at [place] says the cerebellar abnormality is causing pressure and likely causing her seizures; patient would  like the neurology team here to contact the neurosurgeon there tomorrow  Neurology is consulting, formal consultation in the morning  Another MRI with and without contrast has been ordered specifically to look at the brain stem with very thin cuts  Patient was started on Keppra with loading dose; she had only been on Klonopin previously  Seizure precautions    Weakness- (present on admission)  Assessment & Plan  Left leg, consistent with Josue's paralysis  PT OT if does not recover by morning    Loss of sensation- (present on admission)  Assessment & Plan  Left leg post seizure  System with Josue's paralysis  Monitor for resolution  MRI pending    Hypokalemia- (present on admission)  Assessment & Plan  Replacing, check mag  Follow labs tomorrow    DVT prophylaxis subcu Lovenox

## 2020-09-02 NOTE — PROGRESS NOTES
Seizure 1 start at 4860-0623    Seizure 2 start at 0939- 4mg ativan given- 0940     Post ictal difficult to arouse, on room air with VSS.     98/59  71  97% RA   18  97.5f      Dr. Abraham notified at 0941 of patient condition, states neurology NP will be by soon to see patient.

## 2020-09-02 NOTE — ED NOTES
Medicated PT per MAR for HA.   Pt transported via gurney to [facility] with transport.

## 2020-09-02 NOTE — ASSESSMENT & PLAN NOTE
Left leg.  Inconsistent physical exam findings.  Neurology consulting  States she has low back pain consider MRI spine

## 2020-09-02 NOTE — PROCEDURES
ROUTINE ELECTROENCEPHALOGRAM REPORT      Referring provider: Dr. Bain.     DOS: 9/2/2020 (total recording of 21 minutes)    INDICATION:  Sadia Troncoso 24 y.o. female presenting with seizures.    CURRENT ANTIEPILEPTIC REGIMEN: Levetiracetam, lorazepam.    TECHNIQUE: 30 channel routine electroencephalogram (EEG) was performed in accordance with the international 10-20 system. The study was reviewed in bipolar and referential montages. The recording examined the patient during wakeful and drowsy/sleep state(s).     DESCRIPTION OF THE RECORD:  During the wakefulness, the background showed a symmetrical 12 hz alpha activity posteriorly with amplitude of 70 mV.  There was reactivity to eye closure/opening.  A normal anterior-posterior gradient was noted with faster beta frequencies seen anteriorly.  During drowsiness, theta/delta frequencies were seen.    During the sleep state, background shows diffuse high-amplitude 4-5 Hz delta activity.  Symmetrical high-amplitude sleep spindles and vertex sharps were seen in the leads over the central regions.     ACTIVATION PROCEDURES:   Not performed.    ICTAL AND/OR INTERICTAL FINDINGS:   No focal or generalized epileptiform activity noted. No regional slowing was seen during this routine study.  No clinical events or seizures were reported or recorded during the study.     EKG: sampling of the EKG recording demonstrated sinus rhythm.       INTERPRETATION:  This is a normal routine EEG recording in the awake, drowsy/sleep state(s).  Clinical correlation is recommended.    Note: A normal EEG does not rule out epilepsy.  If the clinical suspicion remains high for seizures, a prolonged recording to capture clinical or subclinical events may be helpful.        Zi Sadler MD   Epilepsy and Neurodiagnostics.   Clinical  of Neurology [facility].   Diplomate in Neurology, Epilepsy, and Electrodiagnostic Medicine.    Office: [phone]  Fax: [phone]

## 2020-09-02 NOTE — PROGRESS NOTES
Brief Neurology Note    Hx seizures w post ictal numbness and weakness (post ictal Todds). Managed on clonazepam. Hx of R sized cerebellar tumor. Called as a code stroke.    Not a candidate for tPA given intracranial mass. Also clinical Hx suggests sz      Prelim recs:  CT head w and w/o CST  Load w Keppra 2g  Continue w Keppra 1g BID  Continue home dose of clonazepam  EEG  Toxic metabolic workup    Formal ne urology consult to follow in the AM.     KAROLINE Bain MD  Neurology
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
## 2020-09-02 NOTE — PROGRESS NOTES
"4th seizure, prior to event states \"everything is numb.\"      Seizure starts  1622- ativan given at 8498-5767 end.     Unconscious, convulsions, muscle rigidity, repetitive jerky movements.      Post ictal difficult to arouse, HR 86 100%.        "

## 2020-09-02 NOTE — PROGRESS NOTES
Brief Neurology Note    The patient's chart has been reviewed. Case discussed with Dr. Abraham, Dr. Sadler, and bedside RN.     Per RN report, patient had 3 episodes of seizure like activity over 1 minute with right posterior tongue bite, difficulty arousing but able to follow commands, nausea, full body tingling, but no loss of continence.     Routine EEG was completed earlier with no reported events. Awaiting official report from Dr. Sadler. MRI brain w/wo contrast within normal limits.      Updated recommendations:  - Continuous video EEG to capture seizure like activity   - Lorazepam 2mg IV q6hr PRN >3 CPS's in 24 hours, >2 GTC's in 24 hours, and any GTC lasting longer than 5 minutes  - Continue Keppra and home dose clonazepam.  - If any seizure like activity occurs prior to continuous video EEG, RN is to record and send to the on call Neurohospitalist via encrypted text ([employer]).     17:52 Addendum  Received update from Dr. Sadler- routine EEG this morning was normal.   Patient continuing to have seizure like activity per RN progress notes.  Patient started on continuous video EEG at 17:06. Received call from Dr. Sadler at 17:46 that patient had lone psychogenic, nonepileptic spell captured on cVEEG.     Updated recommendations:  - Continue cVEEG to capture seizure like activity for 24 hours   - RN updated to avoid administration of Ativan for PNES.   - Ativan PRN discontinued  - Discontinue Keppra  - Continue home dose clonazepam.  - Recommend Psychiatry consult.

## 2020-09-02 NOTE — PROGRESS NOTES
2 RN SKIN CHECK     2 RN skin check completed with 2nd RN.   Devices in place - none.  Skin assessed under devices - n/a.  Confirmed pressure ulcers found on - none.  New potential pressure ulcers noted on - none. Wound consult placed - n/a.     The following interventions in place - skin CDI, pt reminded to reposition in bed frequently and educated on skin breakdown prevention.

## 2020-09-02 NOTE — ASSESSMENT & PLAN NOTE
MRI brain unremarkable  MRA showing a right anterior inferior cerebellar vascular loop near the seventh and eighth exit of the cranial nerves  Neurochecks  Keppra 1000 mg twice daily  Ativan as needed  EEG pending  Neurology consult  Drug screen unremarkable, ammonia level normal, thyroid studies back in July this year were unremarkable

## 2020-09-03 VITALS
BODY MASS INDEX: 26.5 KG/M2 | HEIGHT: 60 IN | RESPIRATION RATE: 18 BRPM | OXYGEN SATURATION: 99 % | HEART RATE: 90 BPM | TEMPERATURE: 98.1 F | DIASTOLIC BLOOD PRESSURE: 67 MMHG | WEIGHT: 135 LBS | SYSTOLIC BLOOD PRESSURE: 102 MMHG

## 2020-09-03 PROCEDURE — 700102 HCHG RX REV CODE 250 W/ 637 OVERRIDE(OP): Performed by: HOSPITALIST

## 2020-09-03 PROCEDURE — 700111 HCHG RX REV CODE 636 W/ 250 OVERRIDE (IP): Performed by: HOSPITALIST

## 2020-09-03 PROCEDURE — A9270 NON-COVERED ITEM OR SERVICE: HCPCS | Performed by: HOSPITALIST

## 2020-09-03 PROCEDURE — 95714 VEEG EA 12-26 HR UNMNTR: CPT | Performed by: PSYCHIATRY & NEUROLOGY

## 2020-09-03 PROCEDURE — 99239 HOSP IP/OBS DSCHRG MGMT >30: CPT | Performed by: HOSPITALIST

## 2020-09-03 PROCEDURE — A9270 NON-COVERED ITEM OR SERVICE: HCPCS | Performed by: NURSE PRACTITIONER

## 2020-09-03 PROCEDURE — 700102 HCHG RX REV CODE 250 W/ 637 OVERRIDE(OP): Performed by: NURSE PRACTITIONER

## 2020-09-03 PROCEDURE — 4A10X4Z MONITORING OF CENTRAL NERVOUS ELECTRICAL ACTIVITY, EXTERNAL APPROACH: ICD-10-PCS | Performed by: PSYCHIATRY & NEUROLOGY

## 2020-09-03 RX ORDER — HYDROCODONE BITARTRATE AND ACETAMINOPHEN 5; 325 MG/1; MG/1
1 TABLET ORAL ONCE
Status: COMPLETED | OUTPATIENT
Start: 2020-09-03 | End: 2020-09-03

## 2020-09-03 RX ADMIN — DOCUSATE SODIUM 50 MG AND SENNOSIDES 8.6 MG 2 TABLET: 8.6; 5 TABLET, FILM COATED ORAL at 06:17

## 2020-09-03 RX ADMIN — HYDROCODONE BITARTRATE AND ACETAMINOPHEN 1 TABLET: 5; 325 TABLET ORAL at 01:48

## 2020-09-03 RX ADMIN — BUTALBITAL, ACETAMINOPHEN, AND CAFFEINE 1 TABLET: 50; 325; 40 TABLET ORAL at 06:17

## 2020-09-03 RX ADMIN — ACETAMINOPHEN 650 MG: 325 TABLET, FILM COATED ORAL at 11:47

## 2020-09-03 RX ADMIN — ONDANSETRON 4 MG: 2 INJECTION INTRAMUSCULAR; INTRAVENOUS at 07:27

## 2020-09-03 RX ADMIN — ONDANSETRON 4 MG: 4 TABLET, ORALLY DISINTEGRATING ORAL at 11:52

## 2020-09-03 ASSESSMENT — PAIN DESCRIPTION - PAIN TYPE
TYPE: ACUTE PAIN
TYPE: ACUTE PAIN

## 2020-09-03 NOTE — PROGRESS NOTES
Monitor Summary: SR/ST , GA 0.18, QRS 0.06, QT 0.40, with rare PVCs per strip from monitor room.

## 2020-09-03 NOTE — DIETARY
Nutrition services: Day 2 of admit.  Sadia Troncoso is a 24 y.o. female with admitting DX of seizure.    Consult received for MST 3 with weight loss and poor PO intake.    Pt seen at bedside. Pt reports was feeling nauseous for 5 weeks prior to admit during which she was eating <25% of meals/snacks and was vomiting the food that she did eat. Pt reports usual weight of 145 lbs which declined to 125 lbs, with 20 lb weight loss (14%) in ~5 weeks. Current weight is estimated at 135 lbs. Pt did try drinking protein shakes at home, but was vomiting these up and stopped drinking. Discussed when taking zofran, take ~30 minutes prior to eating. Observed breakfast tray untouched.    Assessment:  Height: 152.4 cm (5')  Weight: 61.2 kg (135 lb) - estimated, asked CNA for measured weight  Body mass index is 26.37 kg/m²., BMI classification: overweight  Diet/Intake: regular, % x 1 meal    Evaluation:   1. PMH of anxiety, depression, migraines, and seizure disorder.  2. Pt presents with known right cerebellar mass and seizure.  3. Pt had EEG monitoring done.  4. Pt reports poor PO intake for 5 weeks and 14% weight loss in 5 weeks d/t nausea/vomiting. Per chart review, pt previously at 160 lbs on 7/17/20 and current weight of 135 lbs, however this is estimated weight so unable to confirm.  5. Observed pt with adequate muscle and fat mass.  6. Labs: K 3.5 on 9/1  7. Meds: colace, zofran prn, bowel protocol, compazine prn  8. Last BM: 9/1    Malnutrition Risk: Severe malnutrition in the context of acute illness as related to seizures as evidenced by eating <25% of meals and stated 14% weight loss in 5 weeks.    Recommendations/Plan:  1. Agree with regular diet, discussed option to set up snacks etc.  2. Encourage intake of meals.  3. Document intake of all meals as % taken in ADL's to provide interdisciplinary communication across all shifts.   4. Monitor weight.  5. Nutrition rep will continue to see patient for ongoing meal and  snack preferences.     RD to follow.

## 2020-09-03 NOTE — DISCHARGE SUMMARY
Discharge Summary    CHIEF COMPLAINT ON ADMISSION  Chief Complaint   Patient presents with   • Possible Stroke     Pt has a known r. cerebellar mass. Today pt had a seizure at 2125 and was given 5mg IM versed by EMS. The pt has had increasing l. sided weakness, however following today's seizure she now has increased weakness, l. sided sensation loss, and unequal smile. Pt presents GCS 15. VSS. Stroke alert called on arrival to ED.       Reason for Admission  EMS - Stroke     Admission Date  9/1/2020    CODE STATUS  Full Code    HPI & HOSPITAL COURSE  Sadia Troncoso is a 24 y.o. female with history of anxiety, depression, migraines, and questionable seizure disorder treated with clonazepam who presented 9/1/20 to the hospital for seizure like activity with increased left sided weakness. Neurology was originally consulted for possible stroke, but patient found to be presenting for seizure like activity with left sided numbness and acute on chronic weakness, thus consulted for possible seizures. CT head w/wo contrast 9/1/20 showed no acute findings. MRI brain w/wo contrast 9/2/20 was within normal limits. She reported having a 'brain mass' in the past but this was not found on imaging. EEG was performed which suggested psychogenic etiology. Patient was not happy about this, plans were made for her to be seen by psychiatry but she did not agree to this plan and wanted to be discharged to follow up as outpatient.        Therefore, she is discharged in fair and stable condition to home with close outpatient follow-up.    The patient met 2-midnight criteria for an inpatient stay at the time of discharge.    Discharge Date  9/3/2020    FOLLOW UP ITEMS POST DISCHARGE  TBD    DISCHARGE DIAGNOSES  Principal Problem:    Seizure disorder (HCC) (Chronic) POA: Yes  Active Problems:    Loss of sensation POA: Yes    Weakness (Chronic) POA: Yes    Anemia POA: Unknown    Hypokalemia POA: Yes  Resolved Problems:    * No resolved hospital  problems. *      FOLLOW UP  No future appointments.  Kishan Alanis M.D.  [address] [place] [id number]  [phone]            MEDICATIONS ON DISCHARGE     Medication List      CONTINUE taking these medications      Instructions   clonazepam 2 MG tablet  Commonly known as: KLONOPIN   Take 2 mg by mouth at bedtime as needed.  Dose: 2 mg     ipratropium-albuterol 0.5-2.5 (3) MG/3ML nebulizer solution  Commonly known as: DUONEB   3 mL by Nebulization route 4 times a day.  Dose: 3 mL            Allergies  No Known Allergies    DIET  Orders Placed This Encounter   Procedures   • Diet Order Regular     Standing Status:   Standing     Number of Occurrences:   1     Order Specific Question:   Diet:     Answer:   Regular [1]       ACTIVITY  As tolerated.  Weight bearing as tolerated    CONSULTATIONS  Neurology    PROCEDURES  EEG  Imaging as above    LABORATORY  Lab Results   Component Value Date    SODIUM 141 09/01/2020    POTASSIUM 3.9 09/02/2020    CHLORIDE 105 09/01/2020    CO2 22 09/01/2020    GLUCOSE 99 09/01/2020    BUN 10 09/01/2020    CREATININE 0.64 09/01/2020        Lab Results   Component Value Date    WBC 6.4 09/01/2020    HEMOGLOBIN 10.1 (L) 09/01/2020    HEMATOCRIT 32.2 (L) 09/01/2020    PLATELETCT 376 09/01/2020        Total time of the discharge process exceeds 35 minutes.

## 2020-09-03 NOTE — PROGRESS NOTES
Neurology Progress Note  Neurohospitalist Service, [facility]    Referring Physician: Adarsh Smith M.D.    Chief Complaint   Patient presents with   • Possible Stroke     Pt has a known r. cerebellar mass. Today pt had a seizure at 2125 and was given 5mg IM versed by EMS. The pt has had increasing l. sided weakness, however following today's seizure she now has increased weakness, l. sided sensation loss, and unequal smile. Pt presents GCS 15. VSS. Stroke alert called on arrival to ED.       HPI: Refer to initial documented Neurology H&P, as detailed in the patient's chart.    Interval History 9/3/2020: Continuous video EEG completed from yesterday evening to this morning reported to be normal with eleven typical convulsive like spells captured all of which were psychogenic and nonepileptic in nature. Currently patient is sitting up in bed, awake, alert, fully oriented, and following commands. Complains of continued lethargy with left side giveaway weakness and sensation change, but no other medical complaints at this time.     Past Medical History:   Past Medical History:   Diagnosis Date   • Anxiety    • Depression    • Seizure disorder (HCC)         FHx:  Family History   Problem Relation Age of Onset   • Seizures Maternal Grandmother    • Seizures Maternal Grandfather    • Seizures Paternal Grandmother    • Seizures Paternal Grandfather         SHx:  Social History     Socioeconomic History   • Marital status:      Spouse name: Not on file   • Number of children: 2   • Years of education: Not on file   • Highest education level: Not on file   Occupational History   • Occupation:      Employer: [employer]   Social Needs   • Financial resource strain: Not on file   • Food insecurity     Worry: Not on file     Inability: Not on file   • Transportation needs     Medical: Not on file     Non-medical: Not on file   Tobacco Use   • Smoking status: Current Every  Day Smoker     Packs/day: 0.05     Years: 1.00     Pack years: 0.05     Types: Cigarettes   • Smokeless tobacco: Never Used   Substance and Sexual Activity   • Alcohol use: Yes     Alcohol/week: 1.8 oz     Types: 3 Cans of beer per week     Frequency: 2-3 times a week     Drinks per session: 3 or 4     Binge frequency: Less than monthly     Comment: 3 alcoholic seltzers 1 day a week   • Drug use: No   • Sexual activity: Not on file   Lifestyle   • Physical activity     Days per week: Not on file     Minutes per session: Not on file   • Stress: Not on file   Relationships   • Social connections     Talks on phone: Not on file     Gets together: Not on file     Attends Rastafarian service: Not on file     Active member of club or organization: Not on file     Attends meetings of clubs or organizations: Not on file     Relationship status: Not on file   • Intimate partner violence     Fear of current or ex partner: Not on file     Emotionally abused: Not on file     Physically abused: Not on file     Forced sexual activity: Not on file   Other Topics Concern   • Not on file   Social History Narrative   • Not on file        Medications:    Current Facility-Administered Medications:   •  acetaminophen/caffeine/butalbital 325-40-50 mg (FIORICET) -40 MG per tablet 1 Tab, 1 Tab, Oral, Q6HRS PRN, Nasim Pa D.O., 1 Tab at 09/03/20 0617  •  clonazePAM (KLONOPIN) tablet 2 mg, 2 mg, Oral, HS PRN, Gabby Landa M.D., 2 mg at 09/02/20 2305  •  ipratropium-albuterol (DUONEB) nebulizer solution, 3 mL, Nebulization, Q6HRS PRN (RT), Gabby Landa M.D.  •  senna-docusate (PERICOLACE or SENOKOT S) 8.6-50 MG per tablet 2 Tab, 2 Tab, Oral, BID, 2 Tab at 09/03/20 0617 **AND** polyethylene glycol/lytes (MIRALAX) PACKET 1 Packet, 1 Packet, Oral, QDAY PRN **AND** magnesium hydroxide (MILK OF MAGNESIA) suspension 30 mL, 30 mL, Oral, QDAY PRN **AND** bisacodyl (DULCOLAX) suppository 10 mg, 10 mg, Rectal, QDAY PRN, Gabby NIEVES  CARLOS Landa  •  enoxaparin (LOVENOX) inj 40 mg, 40 mg, Subcutaneous, DAILY, Gabby Landa M.D., 40 mg at 09/02/20 0519  •  acetaminophen (TYLENOL) tablet 650 mg, 650 mg, Oral, Q6HRS PRN, Gabby Landa M.D., 650 mg at 09/02/20 1538  •  enalaprilat (VASOTEC) injection 1.25 mg, 1.25 mg, Intravenous, Q6HRS PRN, Gabby Landa M.D.  •  ondansetron (ZOFRAN) syringe/vial injection 4 mg, 4 mg, Intravenous, Q4HRS PRN, Gabby Landa M.D., 4 mg at 09/03/20 0727  •  ondansetron (ZOFRAN ODT) dispertab 4 mg, 4 mg, Oral, Q4HRS PRN, Gabby Landa M.D., 4 mg at 09/02/20 2305  •  promethazine (PHENERGAN) tablet 12.5-25 mg, 12.5-25 mg, Oral, Q4HRS PRN, Gabby Landa M.D.  •  promethazine (PHENERGAN) suppository 12.5-25 mg, 12.5-25 mg, Rectal, Q4HRS PRN, Gabby Landa M.D.  •  prochlorperazine (COMPAZINE) injection 5-10 mg, 5-10 mg, Intravenous, Q4HRS PRN, Gabby Landa M.D.    Allergies:  No Known Allergies     Review of systems: In addition to what is detailed in the HPI and interval history above, all other systems reviewed and are negative.      Physical Examination:   Vitals:    09/02/20 2231 09/02/20 2347 09/03/20 0400 09/03/20 0700   BP: 111/69 107/70 (!) 89/54 102/67   Pulse:  92 70 84   Resp:  17 17 16   Temp:  36.4 °C (97.5 °F) 36.3 °C (97.4 °F) 36.9 °C (98.5 °F)   TempSrc:  Temporal Temporal Temporal   SpO2:  96% 97% 97%   Weight:       Height:         General: Patient in no acute distress, pleasant and cooperative.  HEENT: Normocephalic, no signs of acute trauma.   Neck: Supple, no meningeal signs or carotid bruits. There is normal range of motion. No tenderness on exam.   Chest: Clear to auscultation. No cough.   CV: RRR, no murmurs.   Skin: No signs of acute rashes or trauma.   Musculoskeletal: Joints exhibit full range of motion, without any pain to palpation. There are no signs of joint or muscle swelling. There is no tenderness to deep palpation of muscles.   Psychiatric: No hallucinatory  behavior. Denies symptoms of depression or suicidal ideation. Mood and affect appear normal on exam.     NEUROLOGICAL EXAM:   Mental status, orientation: Awake, alert and fully oriented.   Speech and language: Speech is clear and fluent. The patient is able to name, repeat and comprehend.   Memory: There is intact recollection of recent and remote events.   Cranial nerve exam: Pupils are 3-4 mm bilaterally and equally reactive to light and accommodation. Visual fields are intact by confrontation. There is no nystagmus on primary or secondary gaze. Intact full EOM in all directions of gaze. Face appears symmetric. Sensation in the face farzana decreased on left in 3 foci to light touch and pinprick . Uvula is midline. Palate elevates symmetrically. Tongue is midline and without any signs of tongue biting or fasciculations. Sternocleidomastoid muscles exhibit is normal strength bilaterally. Shoulder shrug is intact bilaterally.   Motor exam: Strength is 5/5 in RUE, 4/5 with giveaway weakness RLE, 4-/5 with giveaway weakness to LUE, and 4-/5 with effort dependence/giveaway weakness to LLE. Tone is normal. No abnormal movements were seen on exam.   Sensory exam Reveals decreased sense of light touch and pinprick in left face, LUE and LLE.  Deep tendon reflexes:  2+ throughout. Plantar responses are flexor. There is no clonus.   Coordination: No ataxia, shows a normal finger-nose-finger. Normal rapidly alternating movements.   Gait: Deferred per patient request.       Ancillary Data Reviewed:    Labs:  Lab Results   Component Value Date/Time    PROTHROMBTM 13.1 09/01/2020 10:10 PM    INR 0.97 09/01/2020 10:10 PM      Lab Results   Component Value Date/Time    WBC 6.4 09/01/2020 10:10 PM    RBC 3.75 (L) 09/01/2020 10:10 PM    HEMOGLOBIN 10.1 (L) 09/01/2020 10:10 PM    HEMATOCRIT 32.2 (L) 09/01/2020 10:10 PM    MCV 85.9 09/01/2020 10:10 PM    MCH 26.9 (L) 09/01/2020 10:10 PM    MCHC 31.4 (L) 09/01/2020 10:10 PM    MPV 10.8  09/01/2020 10:10 PM    NEUTSPOLYS 48.90 09/01/2020 10:10 PM    LYMPHOCYTES 42.70 (H) 09/01/2020 10:10 PM    MONOCYTES 6.70 09/01/2020 10:10 PM    EOSINOPHILS 0.90 09/01/2020 10:10 PM    BASOPHILS 0.50 09/01/2020 10:10 PM      Lab Results   Component Value Date/Time    SODIUM 141 09/01/2020 10:10 PM    POTASSIUM 3.9 09/02/2020 08:22 AM    CHLORIDE 105 09/01/2020 10:10 PM    CO2 22 09/01/2020 10:10 PM    GLUCOSE 99 09/01/2020 10:10 PM    BUN 10 09/01/2020 10:10 PM    CREATININE 0.64 09/01/2020 10:10 PM      Lab Results   Component Value Date/Time    CHOLSTRLTOT 195 07/16/2020 04:35 AM     (H) 07/16/2020 04:35 AM    HDL 43 07/16/2020 04:35 AM    TRIGLYCERIDE 95 07/16/2020 04:35 AM       Lab Results   Component Value Date/Time    ALKPHOSPHAT 67 09/01/2020 10:10 PM    ASTSGOT 18 09/01/2020 10:10 PM    ALTSGPT 20 09/01/2020 10:10 PM    TBILIRUBIN <0.2 09/01/2020 10:10 PM        Imaging/Testing:    I interpreted and/or reviewed the patient's neuroimaging    MR-BRAIN-WITH & W/O   Final Result      MRI of the brain without and with contrast within normal limits.      DX-CHEST-PORTABLE (1 VIEW)   Final Result         1.  No acute cardiopulmonary disease.      CT-HEAD WITH & W/O   Final Result         1.  No acute intracranial abnormality.          Assessment:    Sadia Troncoso is a 24 y.o. female with relevant history of anxiety, depression, migraines, and nonspecific seizure disorder treated with clonazepam who presented 9/1/20 to the hospital for seizure like activity with increased left sided weakness. Neurology was originally consulted for possible stroke, but patient found to be presenting for seizure like activity with left sided numbness and acute on chronic weakness, thus consulted for possible seizures. CT head w/wo contrast 9/1/20 showed no acute findings. MRI brain w/wo contrast 9/2/20 was within normal limits. Neurological exam is stable with weakness of the left arm and left leg that appears more so effort  dependent with giveaway weakness. Other significant finding of loss of sensation to the left face, left arm, and left leg to light touch and pinprick; however, imaging has revealed no findings that would localize to this deficit. Headache is likely migrainous in nature given history, with no acute or chronic findings were seen on CT head w/wo contrast or MRI brain w/wo contrast. Previously in visit on 7/16/2020, MRI brain w/wo contrast appeared the same, and the finding in question in her occipital lobe was a prominent venous sinus with some CSF asymmetry which is a normal variant. Additionally, there was no contrast enhancement on MRI brain w/wo contrast on 7/15/20 or today, 9/2/20, that would suggest CSF leak. Routine EEG was completed yesterday morning and was normal per Dr. Sadler. Continuous video EEG completed from yesterday evening to this morning reported to be normal with eleven typical convulsive like spells captured all of which were psychogenic and nonepileptic in nature. 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 The patient is having difficulty with accepting this diagnosis, but open to the plan of care as outlined. She also has a Zoom call with the neurosurgeon previously seen at [facility] today at 10:30.     Plan:    -Recommend Psychiatry consult for potential CBT.   -Recommend outpatient consult to Chasity Mota, Ph.D, conversion disorder medical specialist.   -q4h and PRN neuro assessment. VS per nursing/unit protocol.    -Telemetry and pulse oximetry; currently SR.     -Maintain off all AEDs. Keppra discontinued yesterday evening.   -PRN ativan discontinued yesterday evening.   -Continue home does clonazepam  -Toxic-metabolic workup thus far unremarkable including normal TSH, normal CMP, and normal CBC, UA negative for infection, UDS positive only for benzos explained by home dose clonazepam and PRN ativan given previously for seizure like  activity, ammonia normal at 31, B12 normal at 455, and thiamine pending.  -Counseled patient at length regarding life style and risk factor modification for seizure/spell.   -All other medical management per primary team.   -DVT PPX: SCDs.     The evaluation of the patient, and recommended management, was discussed with Dr. Abraham, Dr. Sadler, Dr. Smith, and bedside RN. I have performed a physical exam and reviewed and updated ROS and Plan today (9/3/2020). In review of yesterday's note (9/2/2020), there are no changes except as documented above.    CYNTHIA Lara.   Nurse Practitioner, Neurohospitalist  [facility]  t) [phone] (f) [phone]

## 2020-09-03 NOTE — CARE PLAN
Problem: Communication  Goal: The ability to communicate needs accurately and effectively will improve  Outcome: PROGRESSING AS EXPECTED  Note: Pt A&O x4, communicating needs and using call light appropriately. Hourly rounding in place.      Problem: Safety  Goal: Will remain free from injury  Outcome: PROGRESSING AS EXPECTED  Note: 8 witnessed seizures noted during shift, seizure precautions in place and no patient injury occurred. Two-person assist to bedside commode. No falls. Universal safety precautions in place.

## 2020-09-03 NOTE — DISCHARGE INSTRUCTIONS
Discharge Instructions    Discharged to home by car with relative. Discharged via wheelchair, hospital escort: Yes.  Special equipment needed: Not Applicable    Be sure to schedule a follow-up appointment with your primary care doctor or any specialists as instructed.     Discharge Plan:   Diet Plan: Discussed  Activity Level: Discussed  Confirmed Follow up Appointment: Patient to Call and Schedule Appointment  Confirmed Symptoms Management: Discussed  Medication Reconciliation Updated: Yes    I understand that a diet low in cholesterol, fat, and sodium is recommended for good health. Unless I have been given specific instructions below for another diet, I accept this instruction as my diet prescription.   Other diet: Regular    Special Instructions: None    · Is patient discharged on Warfarin / Coumadin?   No     Depression / Suicide Risk    As you are discharged from this [facility] facility, it is important to learn how to keep safe from harming yourself.    Recognize the warning signs:  · Abrupt changes in personality, positive or negative- including increase in energy   · Giving away possessions  · Change in eating patterns- significant weight changes-  positive or negative  · Change in sleeping patterns- unable to sleep or sleeping all the time   · Unwillingness or inability to communicate  · Depression  · Unusual sadness, discouragement and loneliness  · Talk of wanting to die  · Neglect of personal appearance   · Rebelliousness- reckless behavior  · Withdrawal from people/activities they love  · Confusion- inability to concentrate     If you or a loved one observes any of these behaviors or has concerns about self-harm, here's what you can do:  · Talk about it- your feelings and reasons for harming yourself  · Remove any means that you might use to hurt yourself (examples: pills, rope, extension cords, firearm)  · Get professional help from the community (Mental Health, Substance Abuse, psychological  counseling)  · Do not be alone:Call your Safe Contact- someone whom you trust who will be there for you.  · Call your local CRISIS HOTLINE 516-9635 or 104-701-8632  · Call your local Children's Mobile Crisis Response Team [place] (337) 915-0094 or www.[employer]  · Call the toll free National Suicide Prevention Hotlines   · National Suicide Prevention Lifeline 949-874-ELEW (0390)  · [employer] Line Network 800-SUICIDE (432-8131)      Non-Epileptic Seizures, Adult  A seizure can cause:  · Involuntary movements, like falling or shaking.  · Changes in awareness or consciousness.  · Convulsions. These are episodes of uncontrollable, jerking movement caused by sudden, intense tightening (contraction) of the muscles.  Epileptic seizures are caused by abnormal electrical activity in the brain. Non-epileptic seizures are different. They are not caused by abnormal electrical signals in your brain. These seizures may look like epileptic seizures, but they are not caused by epilepsy.  There are two types of non-epileptic seizures:  · Physiologic non-epileptic seizure. This type results from an underlying problem that causes a disruption in your brain’s electrical activity.  · Psychogenic non-epileptic seizure. This type results from emotional stress. These seizures are sometimes called pseudoseizures.  What are the causes?  Causes of physiologic non-epileptic seizures can include:  · Sudden drop in blood pressure.  · Low blood sugar (glucose).  · Low levels of salt (sodium) in your blood.  · Low levels of calcium in your blood.  · Migraine.  · Heart rhythm problems.  · Sleep disorders, such as narcolepsy.  · Movement disorders, such as Tourette syndrome.  · Infection.  · Certain medicines.  · Drug and alcohol abuse.  · Fever.  Common causes of psychogenic non-epileptic seizures can include:  · Stress.  · Emotional trauma.  · Sexual or physical abuse.  · Major life events, such as divorce or death of a loved  "one.  · Mental health disorders, including anxiety and depression.  What are the signs or symptoms?  Symptoms of a non-epileptic seizure can be similar to those of an epileptic seizure, which may include:  · A change in attention or behavior (altered mental status).  · Loss of consciousness or fainting.  · Convulsions with rhythmic jerking movements.  · Drooling.  · Rapid eye movements.  · Grunting.  · Loss of bladder control and bowel control.  · Bitter taste in the mouth.  · Tongue biting.  Some people experience unusual sensations (aura) before having a seizure. These can include:  · \"Butterflies\" in the stomach.  · Abnormal smells or tastes.  · A feeling of having had a new experience before (déjà vu).  After a non-epileptic seizure, you may have a headache or sore muscles or feel confused and sleepy. Non-epileptic seizures usually:  · Do not cause physical injuries.  · Start slowly.  · Include crying or shrieking.  · Last longer than 2 minutes.  · Include pelvic thrusting.  How is this diagnosed?  Non-epileptic seizures may be diagnosed by:  · Your medical history.  · A physical exam.  · Your symptoms.  ? Your health care provider may want to talk with your friends or relatives who have seen you have a seizure.  ? If possible, it is helpful if you write down your seizure activity, including what led up to the seizure, and share that information with your health care provider.  You may also need to have tests to look for causes of physiologic non-epileptic seizures. These may include:  · An electroencephalogram (EEG). This test measures electrical activity in your brain. If you have had a non-epileptic seizure, the results of your EEG will likely be normal.  · Video EEG. This test takes place in the hospital over the course of 2-7 days. The test uses a video camera and an EEG to monitor your symptoms and the electrical activity in your brain.  · Blood tests.  · Lumbar puncture. This test involves pulling fluid " from your spine to check for infection.  · Electrocardiogram (ECG or EKG). This test checks for an abnormal heart rhythm.  · CT scan.  If your health care provider thinks you have had a psychogenic non-epileptic seizure, you may need to see a mental health specialist for an evaluation.  How is this treated?  The treatment for your seizures will depend on what is causing them. When the underlying condition is treated, your seizures should stop.  If your seizures are being caused by emotional trauma or stress, your health care provider may recommend that you see a mental health professional. Treatment may include:  · Relaxation therapy or cognitive behavioral therapy.  · Medicines to treat depression or anxiety.  · Individual or family counseling.  In some cases, you may have psychogenic seizures in addition to epileptic seizures. If this is the case, you may be prescribed medicine to help with the epileptic seizures.  Follow these instructions at home:  Home care will depend on the type of non-epileptic seizures that you have. In general:  · Follow all instructions from your health care provider. These may include ways to prevent seizures and what to do if you have a seizure.  · Take over-the-counter and prescription medicines only as told by your health care provider.  · Keep all follow-up visits as told by your health care provider. This is important.  · Make sure family members, friends, and coworkers are trained on how to help you if you have a seizure. If you have a seizure, they should:  ? Lay you on the ground to prevent a fall.  ? Place a pillow or piece of clothing under your head.  ? Loosen any clothing around your neck.  ? Turn you onto your side. If vomiting occurs, this helps keep your airway clear.  · Avoid any substances that may prevent your medicine from working properly. If you are prescribed medicine for seizures:  ? Do not use recreational drugs.  ? Limit or avoid alcoholic beverages.  Contact a  health care provider if:  · Your seizures change or become more frequent.  · You continue to have seizures after treatment.  Get help right away if:  · You injure yourself during a seizure.  · You have one seizure after another.  · You have trouble recovering from a seizure.  · You have chest pain or trouble breathing.  · You have a seizure that lasts longer than 5 minutes.  Summary  · Non-epileptic seizures may look like epileptic seizures, but they are not caused by epilepsy.  · The treatment for your seizures will depend on what is causing them. When the underlying condition is treated, your seizures should stop.  · Make sure family members, friends, and coworkers are trained on how to help you if you have a seizure. If you have a seizure, they should lay you on the ground to prevent a fall, protect your head and neck, and turn you onto your side.  This information is not intended to replace advice given to you by your health care provider. Make sure you discuss any questions you have with your health care provider.  Document Released: 02/02/2007 Document Revised: 11/30/2018 Document Reviewed: 09/29/2017  [employer] Patient Education © 2020 [employer]      Clonazepam tablets  What is this medicine?  CLONAZEPAM (kloe NA ze jose) is a benzodiazepine. It is used to treat certain types of seizures. It is also used to treat panic disorder.  This medicine may be used for other purposes; ask your health care provider or pharmacist if you have questions.  COMMON BRAND NAME(S): Ceberclon, Klonopin  What should I tell my health care provider before I take this medicine?  They need to know if you have any of these conditions:  · an alcohol or drug abuse problem  · bipolar disorder, depression, psychosis or other mental health condition  · glaucoma  · kidney or liver disease  · lung or breathing disease  · myasthenia gravis  · Parkinson's disease  · porphyria  · seizures or a history of seizures  · suicidal thoughts  · an  unusual or allergic reaction to clonazepam, other benzodiazepines, foods, dyes, or preservatives  · pregnant or trying to get pregnant  · breast-feeding  How should I use this medicine?  Take this medicine by mouth with a glass of water. Follow the directions on the prescription label. If it upsets your stomach, take it with food or milk. Take your medicine at regular intervals. Do not take it more often than directed. Do not stop taking or change the dose except on the advice of your doctor or health care professional.  A special MedGuide will be given to you by the pharmacist with each prescription and refill. Be sure to read this information carefully each time.  Talk to your pediatrician regarding the use of this medicine in children. Special care may be needed.  Overdosage: If you think you have taken too much of this medicine contact a poison control center or emergency room at once.  NOTE: This medicine is only for you. Do not share this medicine with others.  What if I miss a dose?  If you miss a dose, take it as soon as you can. If it is almost time for your next dose, take only that dose. Do not take double or extra doses.  What may interact with this medicine?  Do not take this medication with any of the following medicines:  · narcotic medicines for cough  · sodium oxybate  This medicine may also interact with the following medications:  · alcohol  · antihistamines for allergy, cough and cold  · antiviral medicines for HIV or AIDS  · certain medicines for anxiety or sleep  · certain medicines for depression, like amitriptyline, fluoxetine, sertraline  · certain medicines for fungal infections like ketoconazole and itraconazole  · certain medicines for seizures like carbamazepine, phenobarbital, phenytoin, primidone  · general anesthetics like halothane, isoflurane, methoxyflurane, propofol  · local anesthetics like lidocaine, pramoxine, tetracaine  · medicines that relax muscles for surgery  · narcotic  medicines for pain  · phenothiazines like chlorpromazine, mesoridazine, prochlorperazine, thioridazine  This list may not describe all possible interactions. Give your health care provider a list of all the medicines, herbs, non-prescription drugs, or dietary supplements you use. Also tell them if you smoke, drink alcohol, or use illegal drugs. Some items may interact with your medicine.  What should I watch for while using this medicine?  Tell your doctor or health care professional if your symptoms do not start to get better or if they get worse.  Do not stop taking except on your doctor's advice. You may develop a severe reaction. Your doctor will tell you how much medicine to take.  You may get drowsy or dizzy. Do not drive, use machinery, or do anything that needs mental alertness until you know how this medicine affects you. To reduce the risk of dizzy and fainting spells, do not stand or sit up quickly, especially if you are an older patient. Alcohol may increase dizziness and drowsiness. Avoid alcoholic drinks.  If you are taking another medicine that also causes drowsiness, you may have more side effects. Give your health care provider a list of all medicines you use. Your doctor will tell you how much medicine to take. Do not take more medicine than directed. Call emergency for help if you have problems breathing or unusual sleepiness.  The use of this medicine may increase the chance of suicidal thoughts or actions. Pay special attention to how you are responding while on this medicine. Any worsening of mood, or thoughts of suicide or dying should be reported to your health care professional right away.  What side effects may I notice from receiving this medicine?  Side effects that you should report to your doctor or health care professional as soon as possible:  · allergic reactions like skin rash, itching or hives, swelling of the face, lips, or tongue  · breathing problems  · confusion  · loss of  balance or coordination  · signs and symptoms of low blood pressure like dizziness; feeling faint or lightheaded, falls; unusually weak or tired  · suicidal thoughts or mood changes  Side effects that usually do not require medical attention (report to your doctor or health care professional if they continue or are bothersome):  · dizziness  · headache  · tiredness  · upset stomach  This list may not describe all possible side effects. Call your doctor for medical advice about side effects. You may report side effects to FDA at 6-135-SGU-2265.  Where should I keep my medicine?  Keep out of the reach of children. This medicine can be abused. Keep your medicine in a safe place to protect it from theft. Do not share this medicine with anyone. Selling or giving away this medicine is dangerous and against the law.  This medicine may cause accidental overdose and death if taken by other adults, children, or pets. Mix any unused medicine with a substance like cat litter or coffee grounds. Then throw the medicine away in a sealed container like a sealed bag or a coffee can with a lid. Do not use the medicine after the expiration date.  Store at room temperature between 15 and 30 degrees C (59 and 86 degrees F). Protect from light. Keep container tightly closed.  NOTE: This sheet is a summary. It may not cover all possible information. If you have questions about this medicine, talk to your doctor, pharmacist, or health care provider.  © 2020 [employer]vier/[employer] Standard (2017-05-26 18:46:32)

## 2020-09-03 NOTE — PROGRESS NOTES
5th seizure 2357-9013 no ativan given.     Unconscious, convulsions, muscle rigidity, repetitive jerky movements.

## 2020-09-03 NOTE — PROGRESS NOTES
0600 (approx.): With patient’s permission, completed daily phone call with designated support person, Zeynep (Mother).  Discussed patient condition, seizure activity overnight, EEG monitoring, medications, and plan of care. All questions answered.  Follow up requested: Zeynep would like patient to call her - patient notified.    Day-shift RN updated regarding call and discussion with patient's Mother.

## 2020-09-03 NOTE — PROGRESS NOTES
Patient discharged to home via car with , escorted to door with CNA. 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 All questions answered, patient voices understanding. Patient is instructed to follow up with her PCP.

## 2020-09-03 NOTE — PROGRESS NOTES
"3 seizures witnessed by RN from [id number], assessments noted in flowsheets.  - Patient was oriented x4, eye's tracking and moving together after each seizure  - c/o nausea, dry-heaving occurred during post-ictal phase    Hospitalist paged ([phone]):   - Spoke to NETTA Lindquist, updated regarding seizure activity and neurology's discontinuation of seizure medications. Per APRN, continue to monitor, no other orders received.    0120: Hospitalist paged regarding patient's c/o pain (head and generalized muscle aches). Per patient, \"Tylenol doesn't help\". One-time dose of Norco ordered and administered.        5 additional seizures witnessed from [id number], assessments noted in flowsheets.      "

## 2020-09-03 NOTE — PROGRESS NOTES
6th seizure [id number]      Unconscious, convulsions, muscle rigidity, repetitive jerky movements.

## 2020-09-07 LAB — VIT B1 BLD-MCNC: 72 NMOL/L (ref 70–180)

## 2020-09-30 ENCOUNTER — HOSPITAL ENCOUNTER (INPATIENT)
Facility: MEDICAL CENTER | Age: 25
LOS: 2 days | DRG: 880 | End: 2020-10-02
Attending: EMERGENCY MEDICINE | Admitting: INTERNAL MEDICINE
Payer: COMMERCIAL

## 2020-09-30 ENCOUNTER — APPOINTMENT (OUTPATIENT)
Dept: RADIOLOGY | Facility: MEDICAL CENTER | Age: 25
DRG: 880 | End: 2020-09-30
Payer: COMMERCIAL

## 2020-09-30 ENCOUNTER — APPOINTMENT (OUTPATIENT)
Dept: RADIOLOGY | Facility: MEDICAL CENTER | Age: 25
DRG: 880 | End: 2020-09-30
Attending: INTERNAL MEDICINE
Payer: COMMERCIAL

## 2020-09-30 DIAGNOSIS — R53.82 CHRONIC FATIGUE: ICD-10-CM

## 2020-09-30 DIAGNOSIS — G40.901 STATUS EPILEPTICUS (HCC): ICD-10-CM

## 2020-09-30 DIAGNOSIS — G43.409 HEMIPLEGIC MIGRAINE WITHOUT STATUS MIGRAINOSUS, NOT INTRACTABLE: Chronic | ICD-10-CM

## 2020-09-30 DIAGNOSIS — F44.5 PSYCHIATRIC PSEUDOSEIZURE: ICD-10-CM

## 2020-09-30 PROBLEM — J96.01 ACUTE RESPIRATORY FAILURE WITH HYPOXIA (HCC): Status: ACTIVE | Noted: 2020-09-30

## 2020-09-30 LAB
ACTION RANGE TRIGGERED IACRT: NO
ALBUMIN SERPL BCP-MCNC: 4.2 G/DL (ref 3.2–4.9)
ALBUMIN/GLOB SERPL: 1.4 G/DL
ALP SERPL-CCNC: 66 U/L (ref 30–99)
ALT SERPL-CCNC: 12 U/L (ref 2–50)
AMPHET UR QL SCN: NEGATIVE
ANION GAP SERPL CALC-SCNC: 19 MMOL/L (ref 7–16)
APPEARANCE UR: CLEAR
AST SERPL-CCNC: 21 U/L (ref 12–45)
BARBITURATES UR QL SCN: NEGATIVE
BASE EXCESS BLDA CALC-SCNC: -7 MMOL/L (ref -4–3)
BASOPHILS # BLD AUTO: 0.3 % (ref 0–1.8)
BASOPHILS # BLD: 0.02 K/UL (ref 0–0.12)
BENZODIAZ UR QL SCN: POSITIVE
BILIRUB SERPL-MCNC: <0.2 MG/DL (ref 0.1–1.5)
BILIRUB UR QL STRIP.AUTO: NEGATIVE
BODY TEMPERATURE: ABNORMAL DEGREES
BUN SERPL-MCNC: 10 MG/DL (ref 8–22)
BZE UR QL SCN: NEGATIVE
CALCIUM SERPL-MCNC: 9 MG/DL (ref 8.5–10.5)
CANNABINOIDS UR QL SCN: NEGATIVE
CHLORIDE SERPL-SCNC: 103 MMOL/L (ref 96–112)
CK SERPL-CCNC: 50 U/L (ref 0–154)
CO2 BLDA-SCNC: 20 MMOL/L (ref 20–33)
CO2 SERPL-SCNC: 15 MMOL/L (ref 20–33)
COLOR UR: YELLOW
COVID ORDER STATUS COVID19: NORMAL
CREAT SERPL-MCNC: 0.66 MG/DL (ref 0.5–1.4)
EKG IMPRESSION: NORMAL
EKG IMPRESSION: NORMAL
EOSINOPHIL # BLD AUTO: 0.07 K/UL (ref 0–0.51)
EOSINOPHIL NFR BLD: 0.9 % (ref 0–6.9)
ERYTHROCYTE [DISTWIDTH] IN BLOOD BY AUTOMATED COUNT: 49.5 FL (ref 35.9–50)
GLOBULIN SER CALC-MCNC: 3.1 G/DL (ref 1.9–3.5)
GLUCOSE SERPL-MCNC: 81 MG/DL (ref 65–99)
GLUCOSE UR STRIP.AUTO-MCNC: NEGATIVE MG/DL
HCG SERPL QL: NEGATIVE
HCO3 BLDA-SCNC: 18.7 MMOL/L (ref 17–25)
HCT VFR BLD AUTO: 37.8 % (ref 37–47)
HGB BLD-MCNC: 11.7 G/DL (ref 12–16)
HOROWITZ INDEX BLDA+IHG-RTO: 450 MM[HG]
IMM GRANULOCYTES # BLD AUTO: 0.04 K/UL (ref 0–0.11)
IMM GRANULOCYTES NFR BLD AUTO: 0.5 % (ref 0–0.9)
INST. QUALIFIED PATIENT IIQPT: YES
KETONES UR STRIP.AUTO-MCNC: NEGATIVE MG/DL
LACTATE BLD-SCNC: 1.3 MMOL/L (ref 0.5–2)
LEUKOCYTE ESTERASE UR QL STRIP.AUTO: NEGATIVE
LYMPHOCYTES # BLD AUTO: 2.51 K/UL (ref 1–4.8)
LYMPHOCYTES NFR BLD: 31.5 % (ref 22–41)
MCH RBC QN AUTO: 26.7 PG (ref 27–33)
MCHC RBC AUTO-ENTMCNC: 31 G/DL (ref 33.6–35)
MCV RBC AUTO: 86.3 FL (ref 81.4–97.8)
METHADONE UR QL SCN: NEGATIVE
MICRO URNS: NORMAL
MONOCYTES # BLD AUTO: 0.53 K/UL (ref 0–0.85)
MONOCYTES NFR BLD AUTO: 6.6 % (ref 0–13.4)
NEUTROPHILS # BLD AUTO: 4.81 K/UL (ref 2–7.15)
NEUTROPHILS NFR BLD: 60.2 % (ref 44–72)
NITRITE UR QL STRIP.AUTO: NEGATIVE
NRBC # BLD AUTO: 0 K/UL
NRBC BLD-RTO: 0 /100 WBC
O2/TOTAL GAS SETTING VFR VENT: 30 %
OPIATES UR QL SCN: NEGATIVE
OXYCODONE UR QL SCN: NEGATIVE
PCO2 BLDA: 36.7 MMHG (ref 26–37)
PCO2 TEMP ADJ BLDA: 36.7 MMHG (ref 26–37)
PCP UR QL SCN: NEGATIVE
PH BLDA: 7.31 [PH] (ref 7.4–7.5)
PH TEMP ADJ BLDA: 7.31 [PH] (ref 7.4–7.5)
PH UR STRIP.AUTO: 6.5 [PH] (ref 5–8)
PLATELET # BLD AUTO: 321 K/UL (ref 164–446)
PMV BLD AUTO: 12 FL (ref 9–12.9)
PO2 BLDA: 135 MMHG (ref 64–87)
PO2 TEMP ADJ BLDA: 135 MMHG (ref 64–87)
POTASSIUM SERPL-SCNC: 4.1 MMOL/L (ref 3.6–5.5)
PROPOXYPH UR QL SCN: NEGATIVE
PROT SERPL-MCNC: 7.3 G/DL (ref 6–8.2)
PROT UR QL STRIP: NEGATIVE MG/DL
RBC # BLD AUTO: 4.38 M/UL (ref 4.2–5.4)
RBC UR QL AUTO: NEGATIVE
SAO2 % BLDA: 99 % (ref 93–99)
SARS-COV-2 RNA RESP QL NAA+PROBE: NOTDETECTED
SODIUM SERPL-SCNC: 137 MMOL/L (ref 135–145)
SP GR UR STRIP.AUTO: 1.02
SPECIMEN DRAWN FROM PATIENT: ABNORMAL
SPECIMEN SOURCE: NORMAL
UROBILINOGEN UR STRIP.AUTO-MCNC: 1 MG/DL
WBC # BLD AUTO: 8 K/UL (ref 4.8–10.8)

## 2020-09-30 PROCEDURE — 94150 VITAL CAPACITY TEST: CPT

## 2020-09-30 PROCEDURE — C9803 HOPD COVID-19 SPEC COLLECT: HCPCS | Performed by: INTERNAL MEDICINE

## 2020-09-30 PROCEDURE — 700111 HCHG RX REV CODE 636 W/ 250 OVERRIDE (IP): Performed by: INTERNAL MEDICINE

## 2020-09-30 PROCEDURE — 31500 INSERT EMERGENCY AIRWAY: CPT

## 2020-09-30 PROCEDURE — 99222 1ST HOSP IP/OBS MODERATE 55: CPT | Performed by: PSYCHIATRY & NEUROLOGY

## 2020-09-30 PROCEDURE — 93010 ELECTROCARDIOGRAM REPORT: CPT | Performed by: INTERNAL MEDICINE

## 2020-09-30 PROCEDURE — U0003 INFECTIOUS AGENT DETECTION BY NUCLEIC ACID (DNA OR RNA); SEVERE ACUTE RESPIRATORY SYNDROME CORONAVIRUS 2 (SARS-COV-2) (CORONAVIRUS DISEASE [COVID-19]), AMPLIFIED PROBE TECHNIQUE, MAKING USE OF HIGH THROUGHPUT TECHNOLOGIES AS DESCRIBED BY CMS-2020-01-R: HCPCS

## 2020-09-30 PROCEDURE — 700111 HCHG RX REV CODE 636 W/ 250 OVERRIDE (IP): Performed by: EMERGENCY MEDICINE

## 2020-09-30 PROCEDURE — 81003 URINALYSIS AUTO W/O SCOPE: CPT

## 2020-09-30 PROCEDURE — 302214 INTUBATION BOX: Performed by: EMERGENCY MEDICINE

## 2020-09-30 PROCEDURE — 80307 DRUG TEST PRSMV CHEM ANLYZR: CPT

## 2020-09-30 PROCEDURE — 93005 ELECTROCARDIOGRAM TRACING: CPT | Performed by: EMERGENCY MEDICINE

## 2020-09-30 PROCEDURE — 96366 THER/PROPH/DIAG IV INF ADDON: CPT

## 2020-09-30 PROCEDURE — 700117 HCHG RX CONTRAST REV CODE 255: Performed by: INTERNAL MEDICINE

## 2020-09-30 PROCEDURE — 5A1935Z RESPIRATORY VENTILATION, LESS THAN 24 CONSECUTIVE HOURS: ICD-10-PCS | Performed by: EMERGENCY MEDICINE

## 2020-09-30 PROCEDURE — 0BH18EZ INSERTION OF ENDOTRACHEAL AIRWAY INTO TRACHEA, VIA NATURAL OR ARTIFICIAL OPENING ENDOSCOPIC: ICD-10-PCS | Performed by: EMERGENCY MEDICINE

## 2020-09-30 PROCEDURE — 85025 COMPLETE CBC W/AUTO DIFF WBC: CPT

## 2020-09-30 PROCEDURE — 99292 CRITICAL CARE ADDL 30 MIN: CPT | Performed by: INTERNAL MEDICINE

## 2020-09-30 PROCEDURE — 700105 HCHG RX REV CODE 258: Performed by: PSYCHIATRY & NEUROLOGY

## 2020-09-30 PROCEDURE — 80053 COMPREHEN METABOLIC PANEL: CPT

## 2020-09-30 PROCEDURE — 94002 VENT MGMT INPAT INIT DAY: CPT

## 2020-09-30 PROCEDURE — 700105 HCHG RX REV CODE 258

## 2020-09-30 PROCEDURE — 99291 CRITICAL CARE FIRST HOUR: CPT | Performed by: INTERNAL MEDICINE

## 2020-09-30 PROCEDURE — 700111 HCHG RX REV CODE 636 W/ 250 OVERRIDE (IP)

## 2020-09-30 PROCEDURE — 700102 HCHG RX REV CODE 250 W/ 637 OVERRIDE(OP): Performed by: INTERNAL MEDICINE

## 2020-09-30 PROCEDURE — 700105 HCHG RX REV CODE 258: Performed by: INTERNAL MEDICINE

## 2020-09-30 PROCEDURE — 770022 HCHG ROOM/CARE - ICU (200)

## 2020-09-30 PROCEDURE — 83605 ASSAY OF LACTIC ACID: CPT

## 2020-09-30 PROCEDURE — 93005 ELECTROCARDIOGRAM TRACING: CPT

## 2020-09-30 PROCEDURE — 71045 X-RAY EXAM CHEST 1 VIEW: CPT

## 2020-09-30 PROCEDURE — A9270 NON-COVERED ITEM OR SERVICE: HCPCS | Performed by: INTERNAL MEDICINE

## 2020-09-30 PROCEDURE — C9254 INJECTION, LACOSAMIDE: HCPCS | Performed by: PSYCHIATRY & NEUROLOGY

## 2020-09-30 PROCEDURE — 82803 BLOOD GASES ANY COMBINATION: CPT

## 2020-09-30 PROCEDURE — 96365 THER/PROPH/DIAG IV INF INIT: CPT

## 2020-09-30 PROCEDURE — 70496 CT ANGIOGRAPHY HEAD: CPT

## 2020-09-30 PROCEDURE — 99291 CRITICAL CARE FIRST HOUR: CPT

## 2020-09-30 PROCEDURE — 700111 HCHG RX REV CODE 636 W/ 250 OVERRIDE (IP): Performed by: PSYCHIATRY & NEUROLOGY

## 2020-09-30 PROCEDURE — 96368 THER/DIAG CONCURRENT INF: CPT

## 2020-09-30 PROCEDURE — 70450 CT HEAD/BRAIN W/O DYE: CPT

## 2020-09-30 PROCEDURE — 82550 ASSAY OF CK (CPK): CPT

## 2020-09-30 PROCEDURE — 96375 TX/PRO/DX INJ NEW DRUG ADDON: CPT

## 2020-09-30 PROCEDURE — 96376 TX/PRO/DX INJ SAME DRUG ADON: CPT

## 2020-09-30 PROCEDURE — 84703 CHORIONIC GONADOTROPIN ASSAY: CPT

## 2020-09-30 PROCEDURE — 94770 HCHG CO2 EXPIRED GAS DETERMINATION: CPT

## 2020-09-30 RX ORDER — LORAZEPAM 2 MG/ML
2 INJECTION INTRAMUSCULAR ONCE
Status: COMPLETED | OUTPATIENT
Start: 2020-09-30 | End: 2020-09-30

## 2020-09-30 RX ORDER — SODIUM CHLORIDE 9 MG/ML
1000 INJECTION, SOLUTION INTRAVENOUS ONCE
Status: COMPLETED | OUTPATIENT
Start: 2020-09-30 | End: 2020-09-30

## 2020-09-30 RX ORDER — LORAZEPAM 2 MG/ML
INJECTION INTRAMUSCULAR
Status: COMPLETED
Start: 2020-09-30 | End: 2020-09-30

## 2020-09-30 RX ORDER — TRAMADOL HYDROCHLORIDE 50 MG/1
50 TABLET ORAL EVERY 6 HOURS PRN
Status: DISCONTINUED | OUTPATIENT
Start: 2020-09-30 | End: 2020-10-02 | Stop reason: HOSPADM

## 2020-09-30 RX ORDER — IPRATROPIUM BROMIDE AND ALBUTEROL SULFATE 2.5; .5 MG/3ML; MG/3ML
3 SOLUTION RESPIRATORY (INHALATION)
Status: DISCONTINUED | OUTPATIENT
Start: 2020-09-30 | End: 2020-09-30

## 2020-09-30 RX ORDER — POLYETHYLENE GLYCOL 3350 17 G/17G
1 POWDER, FOR SOLUTION ORAL
Status: DISCONTINUED | OUTPATIENT
Start: 2020-09-30 | End: 2020-10-02 | Stop reason: HOSPADM

## 2020-09-30 RX ORDER — LORAZEPAM 2 MG/ML
4 INJECTION INTRAMUSCULAR ONCE
Status: COMPLETED | OUTPATIENT
Start: 2020-09-30 | End: 2020-09-30

## 2020-09-30 RX ORDER — SODIUM CHLORIDE, SODIUM LACTATE, POTASSIUM CHLORIDE, CALCIUM CHLORIDE 600; 310; 30; 20 MG/100ML; MG/100ML; MG/100ML; MG/100ML
INJECTION, SOLUTION INTRAVENOUS CONTINUOUS
Status: DISCONTINUED | OUTPATIENT
Start: 2020-09-30 | End: 2020-10-01

## 2020-09-30 RX ORDER — LEVETIRACETAM 10 MG/ML
1000 INJECTION INTRAVASCULAR EVERY 12 HOURS
Status: DISCONTINUED | OUTPATIENT
Start: 2020-09-30 | End: 2020-09-30

## 2020-09-30 RX ORDER — LORAZEPAM 2 MG/ML
1 INJECTION INTRAMUSCULAR ONCE
Status: COMPLETED | OUTPATIENT
Start: 2020-09-30 | End: 2020-09-30

## 2020-09-30 RX ORDER — AMOXICILLIN 250 MG
2 CAPSULE ORAL 2 TIMES DAILY
Status: DISCONTINUED | OUTPATIENT
Start: 2020-09-30 | End: 2020-10-02 | Stop reason: HOSPADM

## 2020-09-30 RX ORDER — BISACODYL 10 MG
10 SUPPOSITORY, RECTAL RECTAL
Status: DISCONTINUED | OUTPATIENT
Start: 2020-09-30 | End: 2020-10-02 | Stop reason: HOSPADM

## 2020-09-30 RX ORDER — LORAZEPAM 2 MG/ML
4 INJECTION INTRAMUSCULAR
Status: DISCONTINUED | OUTPATIENT
Start: 2020-09-30 | End: 2020-09-30

## 2020-09-30 RX ORDER — FAMOTIDINE 20 MG/1
20 TABLET, FILM COATED ORAL EVERY 12 HOURS
Status: DISCONTINUED | OUTPATIENT
Start: 2020-09-30 | End: 2020-10-01

## 2020-09-30 RX ORDER — LORAZEPAM 2 MG/ML
0.5 INJECTION INTRAMUSCULAR ONCE
Status: DISCONTINUED | OUTPATIENT
Start: 2020-09-30 | End: 2020-09-30

## 2020-09-30 RX ADMIN — LORAZEPAM 4 MG: 2 INJECTION INTRAMUSCULAR; INTRAVENOUS at 03:37

## 2020-09-30 RX ADMIN — LEVETIRACETAM INJECTION 1000 MG: 10 INJECTION INTRAVENOUS at 02:49

## 2020-09-30 RX ADMIN — LORAZEPAM 4 MG: 2 INJECTION INTRAMUSCULAR; INTRAVENOUS at 06:39

## 2020-09-30 RX ADMIN — LORAZEPAM 2 MG: 2 INJECTION INTRAMUSCULAR; INTRAVENOUS at 03:24

## 2020-09-30 RX ADMIN — SODIUM CHLORIDE 1000 ML: 9 INJECTION, SOLUTION INTRAVENOUS at 03:28

## 2020-09-30 RX ADMIN — LEVETIRACETAM 2000 MG: 100 INJECTION, SOLUTION INTRAVENOUS at 05:01

## 2020-09-30 RX ADMIN — PROPOFOL 70 MCG/KG/MIN: 10 INJECTION, EMULSION INTRAVENOUS at 08:08

## 2020-09-30 RX ADMIN — MIDAZOLAM 2 MG/HR: 5 INJECTION INTRAMUSCULAR; INTRAVENOUS at 06:46

## 2020-09-30 RX ADMIN — FENTANYL CITRATE 100 MCG: 50 INJECTION, SOLUTION INTRAMUSCULAR; INTRAVENOUS at 05:05

## 2020-09-30 RX ADMIN — LORAZEPAM 4 MG: 2 INJECTION INTRAMUSCULAR at 03:37

## 2020-09-30 RX ADMIN — TRAMADOL HYDROCHLORIDE 50 MG: 50 TABLET, FILM COATED ORAL at 16:00

## 2020-09-30 RX ADMIN — LORAZEPAM 2 MG: 2 INJECTION INTRAMUSCULAR at 03:24

## 2020-09-30 RX ADMIN — SODIUM CHLORIDE, POTASSIUM CHLORIDE, SODIUM LACTATE AND CALCIUM CHLORIDE: 600; 310; 30; 20 INJECTION, SOLUTION INTRAVENOUS at 18:10

## 2020-09-30 RX ADMIN — LORAZEPAM 4 MG: 2 INJECTION INTRAMUSCULAR; INTRAVENOUS at 04:48

## 2020-09-30 RX ADMIN — LORAZEPAM 1 MG: 2 INJECTION INTRAMUSCULAR; INTRAVENOUS at 02:30

## 2020-09-30 RX ADMIN — SODIUM CHLORIDE 200 MG: 9 INJECTION, SOLUTION INTRAVENOUS at 05:21

## 2020-09-30 RX ADMIN — LORAZEPAM 1 MG: 2 INJECTION INTRAMUSCULAR; INTRAVENOUS at 02:37

## 2020-09-30 RX ADMIN — SODIUM CHLORIDE, POTASSIUM CHLORIDE, SODIUM LACTATE AND CALCIUM CHLORIDE: 600; 310; 30; 20 INJECTION, SOLUTION INTRAVENOUS at 08:04

## 2020-09-30 RX ADMIN — LORAZEPAM 1 MG: 2 INJECTION INTRAMUSCULAR at 02:30

## 2020-09-30 RX ADMIN — IOHEXOL 90 ML: 350 INJECTION, SOLUTION INTRAVENOUS at 07:08

## 2020-09-30 RX ADMIN — PROPOFOL 15 MCG/KG/MIN: 10 INJECTION, EMULSION INTRAVENOUS at 03:36

## 2020-09-30 ASSESSMENT — FIBROSIS 4 INDEX
FIB4 SCORE: 0.49
FIB4 SCORE: 0.28

## 2020-09-30 ASSESSMENT — PULMONARY FUNCTION TESTS: FVC: 1.3

## 2020-09-30 NOTE — ED NOTES
Assist RN:  Intubation timeline  0328 ERP made decision to intubate patient due to status epilepticus   RT, ERP, RNx2, pharmacy at bedside   0329 Vitals BP 94/56, HR 90, O2 saturation 100%  0330 IV bolus ordered and initiated  0332 100 propofol in  0333 50 rocuronium in  0334 intubation performed, breath sounds equal bilaterally per ERP, tube 22 at teeth  0336 additional 18g PIV placed left forearm   0336 VS /70, , O2 saturation 100%

## 2020-09-30 NOTE — CONSULTS
"Critical Care Consultation    Date of consult: 9/30/2020    Referring Physician  Jono Mesa M.D.    Reason for Consultation  Recurrent seizure    History of Presenting Illness  24 y.o. female who presented 9/30/2020 with recurrent seizures. 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 Pt was on keppra at home. At ED, pt was developed tonic clonic seizures, subsequently intubated for airway protection. Started on propofol gtt. Given versed pushes. Neurology was consulted. CT head is negative.     When I came to bedside, pt is actively having generalized tonic clonic seizure. Total 8mg of ativan was given. Keppra was loaded with total 3gr IV loading dose. Vimpat load 200mg was ordered. Versed gtt was ordered. Neurology was notified.     Code Status  Prior    Review of Systems  Review of Systems   Reason unable to perform ROS: intubated, sedated, RASS -4, unable to perform.   All other systems reviewed and are negative.      Past Medical History   has a past medical history of Anxiety, Depression, and Seizure disorder (HCC).    Surgical History   has no past surgical history on file.    Family History  family history includes Seizures in her maternal grandfather, maternal grandmother, paternal grandfather, and paternal grandmother.    Social History   reports that she has been smoking cigarettes. She has a 0.05 pack-year smoking history. She has never used smokeless tobacco. She reports current alcohol use of about 1.8 oz of alcohol per week. She reports that she does not use drugs.    Medications  Home Medications    **Home medications have not yet been reviewed for this encounter**       Current Facility-Administered Medications   Medication Dose Route Frequency Provider Last Rate Last Dose   • levETIRAcetam (Keppra) 1000 mg in 100 mL NaCl IV premix  1,000 mg Intravenous Q12HRS Jono Mesa M.D.   Stopped at 09/30/20 0304   • propofol (DIPRIVAN) injection  " 0-80 mcg/kg/min Intravenous Continuous Jono Mesa M.D. 12.4 mL/hr at 09/30/20 0402 35 mcg/kg/min at 09/30/20 0402     Current Outpatient Medications   Medication Sig Dispense Refill   • ipratropium-albuterol (DUONEB) 0.5-2.5 (3) MG/3ML nebulizer solution 3 mL by Nebulization route 4 times a day.     • clonazepam (KLONOPIN) 2 MG tablet Take 2 mg by mouth at bedtime as needed.         Allergies  No Known Allergies    Vital Signs last 24 hours  Temp:  [36.7 °C (98.1 °F)] 36.7 °C (98.1 °F)  Pulse:  [] 137  Resp:  [16-27] 20  BP: ()/(53-77) 129/75  SpO2:  [96 %-100 %] 100 %    Physical Exam  Physical Exam  Vitals signs and nursing note reviewed.   Constitutional:       Appearance: She is ill-appearing.      Comments: Pt is actively having generalized tonic clonic seizures at time of my exam.    HENT:      Head: Normocephalic and atraumatic.      Mouth/Throat:      Mouth: Mucous membranes are dry.      Comments: ET tube in place  Neck:      Musculoskeletal: Neck supple.   Cardiovascular:      Rate and Rhythm: Normal rate and regular rhythm.      Pulses: Normal pulses.      Heart sounds: Normal heart sounds. No murmur.   Pulmonary:      Effort: No respiratory distress.      Breath sounds: Normal breath sounds. No wheezing, rhonchi or rales.   Abdominal:      General: There is no distension.      Palpations: Abdomen is soft.      Tenderness: There is no abdominal tenderness. There is no guarding.   Musculoskeletal:      Right lower leg: No edema.      Left lower leg: No edema.   Skin:     Coloration: Skin is not jaundiced.      Findings: No bruising or rash.   Neurological:      Comments: Unable to obtain due to intubated and sedated   Psychiatric:      Comments: Not able to obtain due to mental status          Fluids  No intake or output data in the 24 hours ending 09/30/20 0407    Laboratory  Recent Results (from the past 48 hour(s))   HCG Qual Serum    Collection Time: 09/30/20  2:40 AM   Result Value  Ref Range    Beta-Hcg Qualitative Serum Negative Negative   CBC WITH DIFFERENTIAL    Collection Time: 09/30/20  2:40 AM   Result Value Ref Range    WBC 8.0 4.8 - 10.8 K/uL    RBC 4.38 4.20 - 5.40 M/uL    Hemoglobin 11.7 (L) 12.0 - 16.0 g/dL    Hematocrit 37.8 37.0 - 47.0 %    MCV 86.3 81.4 - 97.8 fL    MCH 26.7 (L) 27.0 - 33.0 pg    MCHC 31.0 (L) 33.6 - 35.0 g/dL    RDW 49.5 35.9 - 50.0 fL    Platelet Count 321 164 - 446 K/uL    MPV 12.0 9.0 - 12.9 fL    Neutrophils-Polys 60.20 44.00 - 72.00 %    Lymphocytes 31.50 22.00 - 41.00 %    Monocytes 6.60 0.00 - 13.40 %    Eosinophils 0.90 0.00 - 6.90 %    Basophils 0.30 0.00 - 1.80 %    Immature Granulocytes 0.50 0.00 - 0.90 %    Nucleated RBC 0.00 /100 WBC    Neutrophils (Absolute) 4.81 2.00 - 7.15 K/uL    Lymphs (Absolute) 2.51 1.00 - 4.80 K/uL    Monos (Absolute) 0.53 0.00 - 0.85 K/uL    Eos (Absolute) 0.07 0.00 - 0.51 K/uL    Baso (Absolute) 0.02 0.00 - 0.12 K/uL    Immature Granulocytes (abs) 0.04 0.00 - 0.11 K/uL    NRBC (Absolute) 0.00 K/uL   COMP METABOLIC PANEL    Collection Time: 09/30/20  2:40 AM   Result Value Ref Range    Sodium 137 135 - 145 mmol/L    Potassium 4.1 3.6 - 5.5 mmol/L    Chloride 103 96 - 112 mmol/L    Co2 15 (L) 20 - 33 mmol/L    Anion Gap 19.0 (H) 7.0 - 16.0    Glucose 81 65 - 99 mg/dL    Bun 10 8 - 22 mg/dL    Creatinine 0.66 0.50 - 1.40 mg/dL    Calcium 9.0 8.5 - 10.5 mg/dL    AST(SGOT) 21 12 - 45 U/L    ALT(SGPT) 12 2 - 50 U/L    Alkaline Phosphatase 66 30 - 99 U/L    Total Bilirubin <0.2 0.1 - 1.5 mg/dL    Albumin 4.2 3.2 - 4.9 g/dL    Total Protein 7.3 6.0 - 8.2 g/dL    Globulin 3.1 1.9 - 3.5 g/dL    A-G Ratio 1.4 g/dL   ESTIMATED GFR    Collection Time: 09/30/20  2:40 AM   Result Value Ref Range    GFR If African American >60 >60 mL/min/1.73 m 2    GFR If Non African American >60 >60 mL/min/1.73 m 2   EKG    Collection Time: 09/30/20  2:41 AM   Result Value Ref Range    Report       [facility]    Test  "Date:  2020  Pt Name:    JONATHAN DICKERSON                 Department: ER  MRN:        [id number]                      Room:        15  Gender:     Female                       Technician: [id number]  :        1995                   Requested By:JONO TAVARES  Order #:    [id number]                    Reading MD: Jono Tavares    Measurements  Intervals                                Axis  Rate:       102                          P:          43  GA:         156                          QRS:        72  QRSD:       80                           T:          54  QT:         348  QTc:        454    Interpretive Statements  SINUS TACHYCARDIA  BASELINE WANDER IN LEAD(S) V4  Compared to ECG 2020 23:00:41  Sinus rhythm no longer present  Electronically Signed On 2020 3:18:42 PDT by Jono Tavares     ISTAT ARTERIAL BLOOD GAS    Collection Time: 20  3:50 AM   Result Value Ref Range    Ph 7.314 (L) 7.400 - 7.500    Pco2 36.7 26.0 - 37.0 mmHg    Po2 135 (H) 64 - 87 mmHg    Tco2 20 20 - 33 mmol/L    S02 99 93 - 99 %    Hco3 18.7 17.0 - 25.0 mmol/L    BE -7 (L) -4 - 3 mmol/L    Body Temp 37.0 C degrees    O2 Therapy 30 %    iPF Ratio 450     Ph Temp Vesta 7.314 (L) 7.400 - 7.500    Pco2 Temp Co 36.7 26.0 - 37.0 mmHg    Po2 Temp Cor 135 (H) 64 - 87 mmHg    Specimen Arterial     Action Range Triggered NO     Inst. Qualified Patient YES        Imaging  CT-HEAD W/O    (Results Pending)   DX-CHEST-PORTABLE (1 VIEW)    (Results Pending)       Assessment/Plan  Seizure disorder (HCC)- (present on admission)  Assessment & Plan  Generalized tonic clonic seizures  Seizures appear intractable at ED.   S/p keppra load total 3gr, continue keppra  On propofol gtt at 80  Multiple doses of ativan   Loading vimpat 200mg   Starting versed gtt.   Neurology was consulted Dr. Bain  EEG in am  Given alleged hx of \"vascular loop\" as etiology of her seizure, will get CTA head when seizure is better controlled     Acute respiratory " failure with hypoxia (HCC)  Assessment & Plan  Intubated for airway protection  Continue full vent support, low tidal volume strategy  Gaol sat >92%  Sedation with propofol gtt  Now adding versed gtt for seizures control    Pain control with fentanyl prn  Tube feed in am      Discussed patient condition and risk of morbidity and/or mortality with RN and RT.    D/w Dr. Bain, Neurology and ED. Dr Mesa  The patient remains critically ill.  Critical care time = 40 minutes in directly providing and coordinating critical care and extensive data review.  No time overlap and excludes procedures.
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
## 2020-09-30 NOTE — PROGRESS NOTES
Neurology Interval Note    Discussed w Dr. Edwards    Patient continues to have GTC through Propofol at 80. Given 4mg of Ativan and Keppra load 60mg/kg.     I ordered Vimpat 200mg x1 now and to continue maintenance Vimpat at 100mg IV BID.  Intensivist to add versed sedation stat.     B. Taya SAMS  Neurology

## 2020-09-30 NOTE — ED TRIAGE NOTES
"Sadia Troncoso  24 y.o. female  Chief Complaint   Patient presents with   • Seizure     dx 2.5 wks ago with nonepileptic sz dt a \"vascular loop\" on R cerebellum; missed keppra dose tonight, and has since had 11 witness sz lasting approx 30 sec each. 3 were witnessed by EMS, per EMS       Pt BIB EMS from home for above complaint. Pt reports this has been happening for approx 1.5 yrs prior to diagnosis, along with HA, lethargy, and a recent episode of N/V x 3wks. Pt reports that during workup for N/V, she was found to have increased cerebral pressures. Per EMS, BP increased to SBP 190s during seizures.     Pt alerted this RN that she was about to have a sz; sz lasted approx 1 min, witnessed by this RN and ERP. Pt reports aura of sharp pain in head and tingling in feet and hands prior to seizure. Pt reports temporary L sided numbness with complete sensory and motor loss to LLE following seizures. In between sz and after post ictal state, pt is alert and oriented, speaking in full sentences, and following commands.     Medications given prior to arrival: took home dose keppra approx 1 hr PTA, 2.5mg versed, 4mg zofran    Placed in gown and connected to monitors. ERP at bedside on arrival.    Temp 36.7 °C (98.1 °F) (Temporal)   Ht 1.549 m (5' 1\")   Wt 59 kg (130 lb)   BMI 24.56 kg/m²     "

## 2020-09-30 NOTE — DISCHARGE PLANNING
Medical Social Work    MSW received a call from unit clerk that pt is being intubated and  is outside of the room.  MSW met with pt's spouse outside of the room and provided emotional support.  He states that pt has been through this same thing multiple times and he's just hoping they figure out what's going on with her.  Pt's spouse reports no needs at this time.

## 2020-09-30 NOTE — PROGRESS NOTES
BRIEF NEUROLOGY NOTE    Discussed w Jono Mesa, ERP    24F presented for whom neurology has been consulted for seizure. GTC x 11 events over the pst 24hr. diagnosed w PNES 2.5wk ago pending follow up in epilepsy clinic out pt. Reports that pt missed dose of Keppra.     Received 4mg IV ativan and versed, intubated in the ED w Propofol.     Ddx: PNES vs mixed episodes and med noncompliant    Continue Keppra  UA and  utox    Full neurology consult to follow post 0700    B. Taya SAMS  Neurology

## 2020-09-30 NOTE — ASSESSMENT & PLAN NOTE
Neurology consulted-->pt has a history of psychogenic seizures  Stopped Vimpat, Versed, Propofol, and Keppra  No Ativan  Pt to be seen by psychiatry

## 2020-09-30 NOTE — PSYCHIATRY
Consult requested for PNES.  I came to see pt, she is still intubated. Will attempt to see pt later this afternoon again once she is extubated and fully awake, able to participate in interview. Otherwise, pt will be seen tomorrow morning.

## 2020-09-30 NOTE — PROGRESS NOTES
Pulmonary/Critical Care Medicine   Progress Note    Date of service: 9/30/2020  Time: 0850      Seen by my colleauge earlier in the morning    Admitted and intubated for status epilepticus s/p Keppra load, Vimpar, Versed gtt, and Propofol gtt.  Neuro evaluated and pt with psychogenic seizures and comfortable with extubation.    Brief review since admit:  S/p 15mg of Ativan  Versed drip held this morning  SR 60-160s  SBP 90-200s  Afebrile  OG to LWS  3 PIVs  LR at 100cc/hr  Prop at 60  VD #1  Pepcid  SCDs  Keppra  Vimpat    Plan:  Stood at bedside while performing waking trial and pt followed all commands.  Good vital capacity with strong cough.  Extubated patient while at bedside  O2 sat at 100%  Sleepy, but following commands  Noted no seizure activity    I spent 45 min in reviewing the patient's condition, physical examination, laboratory and imaging data, prior documentation, in discussion with neurology, pharmacy, RN, and RT in formulating an assessment/plan.    Critical Care time: 45 min. No time overlap, procedures not included in time.  [id number]      **Addendum at 6414-->called to bedside with patient have what appears to be tonic-clonic seizure:  O2 sats at 80-81%, eyes/pupils twitching/roving, no response to verbal/pain, and then postictal.  Notified neurology.  Neurology advised to not give Ativan and will have psychiatry see the patient.

## 2020-09-30 NOTE — ED NOTES
Pt had a 2nd witnessed sz, lasting approx 1 minute. ERP notified, sz precautions in place, 1mg ativan administered.

## 2020-09-30 NOTE — PROGRESS NOTES
Dr Quinn at bedside for seizure like activity, Pt shaking upper extremities, rolled self onto side, moaning, unresponsive to verbal and noxious stimuli, eyes moving side to side. Resolution of activity without medication.

## 2020-09-30 NOTE — PROGRESS NOTES
2 RN skin check with Eloisa RN    No areas of skin breakdown noted.    Standard pressure ulcer prevention protocols in place. Skin checked under all devices. Patient turns self from side to side.

## 2020-09-30 NOTE — ED NOTES
Pt continues to seize, VO received from Intensivist at bedside: 4mg ativan administered. COVID swab collected and sent.

## 2020-09-30 NOTE — ED NOTES
3rd witnessed sz after returning from CT. 2mg ativan administered, immediately followed by another sz, witnessed by this RN and ERP.

## 2020-09-30 NOTE — ED PROVIDER NOTES
"ED Provider Note    CHIEF COMPLAINT  Chief Complaint   Patient presents with   • Seizure     dx 2.5 wks ago with nonepileptic sz dt a \"vascular loop\" on R cerebellum; missed keppra dose tonight, and has since had 11 witness sz lasting approx 30 sec each. 3 were witnessed by EMS, per EMS       HPI  Sadia Troncoso is a 24 y.o. female who presents to emerge department with recurrent seizures.  Recently reportedly diagnosed with a nonepileptic seizure disorder secondary to a \"vascular loop \".  This was as of 2-1/2 weeks ago.  On Keppra although she did miss her dose this evening.  Allegedly having up to 11 seizures tonight in a tonic-clonic-like fashion as per her significant other at bedside.  Brought in by EMS.  Given 2-1/2 of Versed and 4 Zofran prior to arrival.    No history from patient secondary to ongoing seizure-like activity.    REVIEW OF SYSTEMS  See HPI for further details. All other systems are negative.     PAST MEDICAL HISTORY   has a past medical history of Anxiety, Depression, and Seizure disorder (HCC).    SOCIAL HISTORY  Social History     Tobacco Use   • Smoking status: Current Every Day Smoker     Packs/day: 0.05     Years: 1.00     Pack years: 0.05     Types: Cigarettes   • Smokeless tobacco: Never Used   Substance and Sexual Activity   • Alcohol use: Yes     Alcohol/week: 1.8 oz     Types: 3 Cans of beer per week     Frequency: 2-3 times a week     Drinks per session: 3 or 4     Binge frequency: Less than monthly     Comment: 3 alcoholic seltzers 1 day a week   • Drug use: No   • Sexual activity: Not on file       SURGICAL HISTORY  patient denies any surgical history    CURRENT MEDICATIONS  Home Medications    **Home medications have not yet been reviewed for this encounter**         ALLERGIES  No Known Allergies    PHYSICAL EXAM  VITAL SIGNS: /75   Pulse (!) 137   Temp 36.7 °C (98.1 °F) (Temporal)   Resp 20   Ht 1.549 m (5' 1\")   Wt 59 kg (130 lb)   SpO2 100%   BMI 24.56 kg/m²  " @[facility][[id number]::@   Pulse ox interpretation: I interpret this pulse ox as normal.  Constitutional: Alert in no apparent distress.  HENT: No signs of trauma, Bilateral external ears normal, Nose normal.   Eyes: Pupils are equal and reactive, Conjunctiva normal, Non-icteric.   Neck: Normal range of motion, No tenderness, Supple, No stridor.   Cardiovascular: Regular rate and rhythm, no murmurs.   Thorax & Lungs: Normal breath sounds, No respiratory distress, No wheezing, No chest tenderness.   Abdomen: Bowel sounds normal, Soft, No tenderness, No masses, No pulsatile masses. No peritoneal signs.  Skin: Warm, Dry, No erythema, No rash.   Extremities: Intact distal pulses, No edema, No tenderness  Musculoskeletal: Good range of motion in all major joints. No tenderness to palpation or major deformities noted.   Neurologic: Alert , to back-to-back tonic-clonic seizure witnessed to myself.  After the second seizure-like activity the patient had significantly improved mentation although she did not return to baseline between these episodes.  Psychiatric: Affect normal, Judgment normal, Mood normal.       DIAGNOSTIC STUDIES / PROCEDURES    EKG  Results for orders placed or performed during the hospital encounter of 20   EKG   Result Value Ref Range    Report       [facility]    Test Date:  2020  Pt Name:    JONATHAN DICKERSON                 Department: ER  MRN:        [id number]                      Room:        15  Gender:     Female                       Technician: [id number]  :        1995                   Requested By:JONO TAVARES  Order #:    [id number]                    Reading MD: Jono Tavares    Measurements  Intervals                                Axis  Rate:       102                          P:          43  VT:         156                          QRS:        72  QRSD:       80                           T:          54  QT:         348  QTc:         454    Interpretive Statements  SINUS TACHYCARDIA  BASELINE WANDER IN LEAD(S) V4  Compared to ECG 09/01/2020 23:00:41  Sinus rhythm no longer present  Electronically Signed On 9- 3:18:42 PDT by Jono Mesa           LABS  Results for orders placed or performed during the hospital encounter of 09/30/20   HCG Qual Serum   Result Value Ref Range    Beta-Hcg Qualitative Serum Negative Negative   CBC WITH DIFFERENTIAL   Result Value Ref Range    WBC 8.0 4.8 - 10.8 K/uL    RBC 4.38 4.20 - 5.40 M/uL    Hemoglobin 11.7 (L) 12.0 - 16.0 g/dL    Hematocrit 37.8 37.0 - 47.0 %    MCV 86.3 81.4 - 97.8 fL    MCH 26.7 (L) 27.0 - 33.0 pg    MCHC 31.0 (L) 33.6 - 35.0 g/dL    RDW 49.5 35.9 - 50.0 fL    Platelet Count 321 164 - 446 K/uL    MPV 12.0 9.0 - 12.9 fL    Neutrophils-Polys 60.20 44.00 - 72.00 %    Lymphocytes 31.50 22.00 - 41.00 %    Monocytes 6.60 0.00 - 13.40 %    Eosinophils 0.90 0.00 - 6.90 %    Basophils 0.30 0.00 - 1.80 %    Immature Granulocytes 0.50 0.00 - 0.90 %    Nucleated RBC 0.00 /100 WBC    Neutrophils (Absolute) 4.81 2.00 - 7.15 K/uL    Lymphs (Absolute) 2.51 1.00 - 4.80 K/uL    Monos (Absolute) 0.53 0.00 - 0.85 K/uL    Eos (Absolute) 0.07 0.00 - 0.51 K/uL    Baso (Absolute) 0.02 0.00 - 0.12 K/uL    Immature Granulocytes (abs) 0.04 0.00 - 0.11 K/uL    NRBC (Absolute) 0.00 K/uL   COMP METABOLIC PANEL   Result Value Ref Range    Sodium 137 135 - 145 mmol/L    Potassium 4.1 3.6 - 5.5 mmol/L    Chloride 103 96 - 112 mmol/L    Co2 15 (L) 20 - 33 mmol/L    Anion Gap 19.0 (H) 7.0 - 16.0    Glucose 81 65 - 99 mg/dL    Bun 10 8 - 22 mg/dL    Creatinine 0.66 0.50 - 1.40 mg/dL    Calcium 9.0 8.5 - 10.5 mg/dL    AST(SGOT) 21 12 - 45 U/L    ALT(SGPT) 12 2 - 50 U/L    Alkaline Phosphatase 66 30 - 99 U/L    Total Bilirubin <0.2 0.1 - 1.5 mg/dL    Albumin 4.2 3.2 - 4.9 g/dL    Total Protein 7.3 6.0 - 8.2 g/dL    Globulin 3.1 1.9 - 3.5 g/dL    A-G Ratio 1.4 g/dL   ESTIMATED GFR   Result Value Ref Range    GFR If   American >60 >60 mL/min/1.73 m 2    GFR If Non African American >60 >60 mL/min/1.73 m 2   URINALYSIS    Specimen: Urine   Result Value Ref Range    Color Yellow     Character Clear     Specific Gravity 1.024 <1.035    Ph 6.5 5.0 - 8.0    Glucose Negative Negative mg/dL    Ketones Negative Negative mg/dL    Protein Negative Negative mg/dL    Bilirubin Negative Negative    Urobilinogen, Urine 1.0 Negative    Nitrite Negative Negative    Leukocyte Esterase Negative Negative    Occult Blood Negative Negative    Micro Urine Req see below    COVID/SARS CoV-2 PCR    Specimen: Nasopharyngeal; Respirate   Result Value Ref Range    COVID Order Status Received    SARS-CoV-2, PCR (In-House)   Result Value Ref Range    SARS-CoV-2 Source NP Swab    EKG   Result Value Ref Range    Report       [facility].    Test Date:  2020  Pt Name:    JONATHAN DICKERSON                 Department: ER  MRN:        [id number]                      Room:       BL 15  Gender:     Female                       Technician: [id number]  :        1995                   Requested By:JONO TAVARES  Order #:    [id number]                    Reading MD: Jono Tavares    Measurements  Intervals                                Axis  Rate:       102                          P:          43  SC:         156                          QRS:        72  QRSD:       80                           T:          54  QT:         348  QTc:        454    Interpretive Statements  SINUS TACHYCARDIA  BASELINE WANDER IN LEAD(S) V4  Compared to ECG 2020 23:00:41  Sinus rhythm no longer present  Electronically Signed On 2020 3:18:42 PDT by Jono Tavares     ISTAT ARTERIAL BLOOD GAS   Result Value Ref Range    Ph 7.314 (L) 7.400 - 7.500    Pco2 36.7 26.0 - 37.0 mmHg    Po2 135 (H) 64 - 87 mmHg    Tco2 20 20 - 33 mmol/L    S02 99 93 - 99 %    Hco3 18.7 17.0 - 25.0 mmol/L    BE -7 (L) -4 - 3 mmol/L    Body Temp 37.0 C degrees    O2 Therapy 30 %     iPF Ratio 450     Ph Temp Vesta 7.314 (L) 7.400 - 7.500    Pco2 Temp Co 36.7 26.0 - 37.0 mmHg    Po2 Temp Cor 135 (H) 64 - 87 mmHg    Specimen Arterial     Action Range Triggered NO     Inst. Qualified Patient YES        RADIOLOGY  DX-CHEST-PORTABLE (1 VIEW)   Final Result         1.  No acute cardiopulmonary disease.      CT-HEAD W/O   Final Result         1.  No acute intracranial abnormality.          0320: Intubation: Patient with status epilepticus.  Patient on full monitoring.  RT and pharmacy called to bedside.  Please see nursing notes for timing and medications as provided.  Rocuronium and propofol used for initial RSI and propofol continued as drip for sedation.  Glide scope was used for 7-1/2 ET tube placement.  22 at the lips.  No complications and confirmatory screening all positive.  Follow-up chest x-ray pending.    CRITICAL CARE  The very real possibilty of a deterioration of this patient's condition required the highest level of my preparedness for sudden, emergent intervention.  I provided critical care services, which included medication orders, frequent reevaluations of the patient's condition and response to treatment, ordering and reviewing test results, and discussing the case with various consultants.  The critical care time associated with the care of the patient was 45 minutes. Review chart for interventions. This time is exclusive of any other billable procedures.       COURSE & MEDICAL DECISION MAKING  Pertinent Labs & Imaging studies reviewed. (See chart for details)  24-year-old female presented to the emergency department with recurrent seizures.  Reportedly having up to 11 seizures prior to arrival and witnessed by myself 3 here in the ER leading to intubation as noted above.  No additional emergent abnormalities identified on hematology or chest x-ray screening.  CT tonight also not showing acute changes or new pathology.  I discussed case with neurology after my initial emergent  interventions which agreeable to current plan.  I additionally discussed the case with the intensivist which will continue ongoing inpatient care in the ICU.    FINAL IMPRESSION  1. Status epilepticus (HCC)            Electronically signed by: Jono Mesa M.D., 9/30/2020 3:16 AM

## 2020-09-30 NOTE — ED NOTES
Bedside report to receiving RNStacy. All questions answered. Pt to CT then to floor on monitor and vent with RT O2 with chart and all belongings. No acute changes in pt condition.

## 2020-09-30 NOTE — PROGRESS NOTES
0600 Pt transported on ventilator from ER to CT for CTA, accompanied by ACLS RN, RT CCT. Patient on transport monitor, no significance during scan.     0630 Patient arrived to S115, transferred to ICU bed without incident. ICU monitor placed. Pt noted to have witnessed seizures approximately 1-2 mins. PRN ativan given.      2 RN skin assessment completed.  Standard skin precautions in place   Mepilex in place  No areas of skin breakdown noted.    Pt belongings include:  Cell phone, pants, & underwear

## 2020-09-30 NOTE — ED NOTES
Pt continues to sz after 4mg ativan. Intensivist updated; VO received for 2nd dose 4mg ativan; medicated per MAR.

## 2020-10-01 ENCOUNTER — APPOINTMENT (OUTPATIENT)
Dept: RADIOLOGY | Facility: MEDICAL CENTER | Age: 25
DRG: 880 | End: 2020-10-01
Attending: INTERNAL MEDICINE
Payer: COMMERCIAL

## 2020-10-01 PROBLEM — R53.82 CHRONIC FATIGUE: Status: ACTIVE | Noted: 2020-10-01

## 2020-10-01 PROBLEM — F44.5 PSYCHIATRIC PSEUDOSEIZURE: Status: ACTIVE | Noted: 2020-07-16

## 2020-10-01 LAB
ANION GAP SERPL CALC-SCNC: 13 MMOL/L (ref 7–16)
BASOPHILS # BLD AUTO: 0.1 % (ref 0–1.8)
BASOPHILS # BLD: 0.01 K/UL (ref 0–0.12)
BUN SERPL-MCNC: 5 MG/DL (ref 8–22)
CALCIUM SERPL-MCNC: 8.8 MG/DL (ref 8.5–10.5)
CHLORIDE SERPL-SCNC: 103 MMOL/L (ref 96–112)
CO2 SERPL-SCNC: 20 MMOL/L (ref 20–33)
CREAT SERPL-MCNC: 0.48 MG/DL (ref 0.5–1.4)
EOSINOPHIL # BLD AUTO: 0.05 K/UL (ref 0–0.51)
EOSINOPHIL NFR BLD: 0.6 % (ref 0–6.9)
ERYTHROCYTE [DISTWIDTH] IN BLOOD BY AUTOMATED COUNT: 49 FL (ref 35.9–50)
GLUCOSE SERPL-MCNC: 92 MG/DL (ref 65–99)
HCT VFR BLD AUTO: 33.1 % (ref 37–47)
HGB BLD-MCNC: 10.3 G/DL (ref 12–16)
IMM GRANULOCYTES # BLD AUTO: 0.03 K/UL (ref 0–0.11)
IMM GRANULOCYTES NFR BLD AUTO: 0.4 % (ref 0–0.9)
LYMPHOCYTES # BLD AUTO: 2.06 K/UL (ref 1–4.8)
LYMPHOCYTES NFR BLD: 25.4 % (ref 22–41)
MAGNESIUM SERPL-MCNC: 1.9 MG/DL (ref 1.5–2.5)
MCH RBC QN AUTO: 26.7 PG (ref 27–33)
MCHC RBC AUTO-ENTMCNC: 31.1 G/DL (ref 33.6–35)
MCV RBC AUTO: 85.8 FL (ref 81.4–97.8)
MONOCYTES # BLD AUTO: 0.55 K/UL (ref 0–0.85)
MONOCYTES NFR BLD AUTO: 6.8 % (ref 0–13.4)
NEUTROPHILS # BLD AUTO: 5.4 K/UL (ref 2–7.15)
NEUTROPHILS NFR BLD: 66.7 % (ref 44–72)
NRBC # BLD AUTO: 0 K/UL
NRBC BLD-RTO: 0 /100 WBC
PHOSPHATE SERPL-MCNC: 3.5 MG/DL (ref 2.5–4.5)
PLATELET # BLD AUTO: 284 K/UL (ref 164–446)
PMV BLD AUTO: 11.3 FL (ref 9–12.9)
POTASSIUM SERPL-SCNC: 3.6 MMOL/L (ref 3.6–5.5)
RBC # BLD AUTO: 3.86 M/UL (ref 4.2–5.4)
SODIUM SERPL-SCNC: 136 MMOL/L (ref 135–145)
WBC # BLD AUTO: 8.1 K/UL (ref 4.8–10.8)

## 2020-10-01 PROCEDURE — 99221 1ST HOSP IP/OBS SF/LOW 40: CPT | Performed by: HOSPITALIST

## 2020-10-01 PROCEDURE — 85025 COMPLETE CBC W/AUTO DIFF WBC: CPT

## 2020-10-01 PROCEDURE — 84100 ASSAY OF PHOSPHORUS: CPT

## 2020-10-01 PROCEDURE — 700111 HCHG RX REV CODE 636 W/ 250 OVERRIDE (IP): Performed by: INTERNAL MEDICINE

## 2020-10-01 PROCEDURE — A9270 NON-COVERED ITEM OR SERVICE: HCPCS | Performed by: PSYCHIATRY & NEUROLOGY

## 2020-10-01 PROCEDURE — 71045 X-RAY EXAM CHEST 1 VIEW: CPT

## 2020-10-01 PROCEDURE — 700102 HCHG RX REV CODE 250 W/ 637 OVERRIDE(OP): Performed by: PSYCHIATRY & NEUROLOGY

## 2020-10-01 PROCEDURE — 83735 ASSAY OF MAGNESIUM: CPT

## 2020-10-01 PROCEDURE — 80048 BASIC METABOLIC PNL TOTAL CA: CPT

## 2020-10-01 PROCEDURE — 99233 SBSQ HOSP IP/OBS HIGH 50: CPT | Performed by: INTERNAL MEDICINE

## 2020-10-01 PROCEDURE — 700102 HCHG RX REV CODE 250 W/ 637 OVERRIDE(OP): Performed by: INTERNAL MEDICINE

## 2020-10-01 PROCEDURE — 770022 HCHG ROOM/CARE - ICU (200)

## 2020-10-01 PROCEDURE — A9270 NON-COVERED ITEM OR SERVICE: HCPCS | Performed by: INTERNAL MEDICINE

## 2020-10-01 PROCEDURE — 99223 1ST HOSP IP/OBS HIGH 75: CPT | Performed by: PSYCHIATRY & NEUROLOGY

## 2020-10-01 RX ORDER — POTASSIUM CHLORIDE 20 MEQ/1
60 TABLET, EXTENDED RELEASE ORAL ONCE
Status: COMPLETED | OUTPATIENT
Start: 2020-10-01 | End: 2020-10-01

## 2020-10-01 RX ORDER — MAGNESIUM SULFATE HEPTAHYDRATE 40 MG/ML
2 INJECTION, SOLUTION INTRAVENOUS ONCE
Status: COMPLETED | OUTPATIENT
Start: 2020-10-01 | End: 2020-10-01

## 2020-10-01 RX ORDER — MODAFINIL 100 MG/1
100 TABLET ORAL EVERY MORNING
Status: DISCONTINUED | OUTPATIENT
Start: 2020-10-01 | End: 2020-10-02 | Stop reason: HOSPADM

## 2020-10-01 RX ORDER — ACETAMINOPHEN 325 MG/1
650 TABLET ORAL EVERY 4 HOURS PRN
Status: DISCONTINUED | OUTPATIENT
Start: 2020-10-01 | End: 2020-10-02 | Stop reason: HOSPADM

## 2020-10-01 RX ADMIN — TRAMADOL HYDROCHLORIDE 50 MG: 50 TABLET, FILM COATED ORAL at 09:40

## 2020-10-01 RX ADMIN — TRAMADOL HYDROCHLORIDE 50 MG: 50 TABLET, FILM COATED ORAL at 17:09

## 2020-10-01 RX ADMIN — MODAFINIL 100 MG: 100 TABLET ORAL at 14:43

## 2020-10-01 RX ADMIN — MAGNESIUM SULFATE 2 G: 2 INJECTION INTRAVENOUS at 09:41

## 2020-10-01 RX ADMIN — ACETAMINOPHEN 650 MG: 325 TABLET, FILM COATED ORAL at 20:56

## 2020-10-01 RX ADMIN — SERTRALINE 50 MG: 50 TABLET, FILM COATED ORAL at 14:43

## 2020-10-01 RX ADMIN — ACETAMINOPHEN 650 MG: 325 TABLET, FILM COATED ORAL at 16:30

## 2020-10-01 RX ADMIN — TRAMADOL HYDROCHLORIDE 50 MG: 50 TABLET, FILM COATED ORAL at 23:22

## 2020-10-01 RX ADMIN — PHENOL 1 SPRAY: 1.5 LIQUID ORAL at 20:56

## 2020-10-01 RX ADMIN — TRAMADOL HYDROCHLORIDE 50 MG: 50 TABLET, FILM COATED ORAL at 01:51

## 2020-10-01 RX ADMIN — PHENOL 1 SPRAY: 1.5 LIQUID ORAL at 12:01

## 2020-10-01 RX ADMIN — POTASSIUM CHLORIDE 60 MEQ: 1500 TABLET, EXTENDED RELEASE ORAL at 09:40

## 2020-10-01 RX ADMIN — PHENOL 1 SPRAY: 1.5 LIQUID ORAL at 14:44

## 2020-10-01 ASSESSMENT — COGNITIVE AND FUNCTIONAL STATUS - GENERAL
STANDING UP FROM CHAIR USING ARMS: A LITTLE
EATING MEALS: A LITTLE
MOBILITY SCORE: 18
MOBILITY SCORE: 18
DRESSING REGULAR LOWER BODY CLOTHING: A LITTLE
SUGGESTED CMS G CODE MODIFIER DAILY ACTIVITY: CK
HELP NEEDED FOR BATHING: A LITTLE
TOILETING: A LITTLE
CLIMB 3 TO 5 STEPS WITH RAILING: A LITTLE
HELP NEEDED FOR BATHING: A LITTLE
WALKING IN HOSPITAL ROOM: A LITTLE
MOVING TO AND FROM BED TO CHAIR: A LITTLE
CLIMB 3 TO 5 STEPS WITH RAILING: A LITTLE
MOVING FROM LYING ON BACK TO SITTING ON SIDE OF FLAT BED: A LITTLE
DRESSING REGULAR LOWER BODY CLOTHING: A LITTLE
MOVING FROM LYING ON BACK TO SITTING ON SIDE OF FLAT BED: A LITTLE
TURNING FROM BACK TO SIDE WHILE IN FLAT BAD: A LITTLE
WALKING IN HOSPITAL ROOM: A LITTLE
TURNING FROM BACK TO SIDE WHILE IN FLAT BAD: A LITTLE
MOVING TO AND FROM BED TO CHAIR: A LITTLE
SUGGESTED CMS G CODE MODIFIER MOBILITY: CK
DRESSING REGULAR UPPER BODY CLOTHING: A LITTLE
SUGGESTED CMS G CODE MODIFIER MOBILITY: CK
STANDING UP FROM CHAIR USING ARMS: A LITTLE
TOILETING: A LITTLE
PERSONAL GROOMING: A LITTLE
DAILY ACTIVITIY SCORE: 18

## 2020-10-01 ASSESSMENT — ENCOUNTER SYMPTOMS
HEADACHES: 1
SEIZURES: 1
CARDIOVASCULAR NEGATIVE: 1
SHORTNESS OF BREATH: 0
COUGH: 0
NERVOUS/ANXIOUS: 1
FOCAL WEAKNESS: 0
NERVOUS/ANXIOUS: 0
HEARTBURN: 0
HEADACHES: 0
DIZZINESS: 0
BLURRED VISION: 0
BACK PAIN: 0
DIARRHEA: 0
ABDOMINAL PAIN: 0
CHILLS: 0
HALLUCINATIONS: 0
NECK PAIN: 0
INSOMNIA: 0
RESPIRATORY NEGATIVE: 1
CONSTIPATION: 0
POLYDIPSIA: 0
DOUBLE VISION: 0
DEPRESSION: 1
GASTROINTESTINAL NEGATIVE: 1
BRUISES/BLEEDS EASILY: 0
DEPRESSION: 0
VOMITING: 0
WEAKNESS: 1
MUSCULOSKELETAL NEGATIVE: 1
EYES NEGATIVE: 1
NAUSEA: 0
BLOOD IN STOOL: 1
PALPITATIONS: 0
FEVER: 0
SORE THROAT: 0

## 2020-10-01 ASSESSMENT — PAIN DESCRIPTION - PAIN TYPE
TYPE: ACUTE PAIN

## 2020-10-01 ASSESSMENT — PATIENT HEALTH QUESTIONNAIRE - PHQ9
4. FEELING TIRED OR HAVING LITTLE ENERGY: SEVERAL DAYS
SUM OF ALL RESPONSES TO PHQ QUESTIONS 1-9: 9
7. TROUBLE CONCENTRATING ON THINGS, SUCH AS READING THE NEWSPAPER OR WATCHING TELEVISION: SEVERAL DAYS
3. TROUBLE FALLING OR STAYING ASLEEP OR SLEEPING TOO MUCH: SEVERAL DAYS
8. MOVING OR SPEAKING SO SLOWLY THAT OTHER PEOPLE COULD HAVE NOTICED. OR THE OPPOSITE, BEING SO FIGETY OR RESTLESS THAT YOU HAVE BEEN MOVING AROUND A LOT MORE THAN USUAL: SEVERAL DAYS
SUM OF ALL RESPONSES TO PHQ9 QUESTIONS 1 AND 2: 2
6. FEELING BAD ABOUT YOURSELF - OR THAT YOU ARE A FAILURE OR HAVE LET YOURSELF OR YOUR FAMILY DOWN: SEVERAL DAYS
1. LITTLE INTEREST OR PLEASURE IN DOING THINGS: SEVERAL DAYS
9. THOUGHTS THAT YOU WOULD BE BETTER OFF DEAD, OR OF HURTING YOURSELF: SEVERAL DAYS
2. FEELING DOWN, DEPRESSED, IRRITABLE, OR HOPELESS: SEVERAL DAYS
5. POOR APPETITE OR OVEREATING: SEVERAL DAYS

## 2020-10-01 ASSESSMENT — FIBROSIS 4 INDEX: FIB4 SCORE: 0.49

## 2020-10-01 ASSESSMENT — LIFESTYLE VARIABLES: SUBSTANCE_ABUSE: 0

## 2020-10-01 NOTE — PSYCHIATRY
"PSYCHIATRIC INTAKE EVALUATION    *Reason for admission:    Seizures, intubated                *Reason for consult: \"psychogenic non-epileptic seizures       *Requesting Physician/APN: CYNTHIA Toure.               Legal Hold status:   n/a         *Chief Complaint: \"I started to have seizures 3 weeks ago\"       *HPI (includes Psychiatric ROS):  Pt says that 1 year ago, 1 month later after having her daughter on march 2020, she started to have neurological symptoms such as HA, lathery, brain fog, and muscle spasms. She sought medical care, and has seen a neurologist and 2 neurosurgeon in the past 1 year. She say that one of the provider was able to identify a vascular loop and recommended possible surgery, however stated never really receiving a formal neurological diagnosis and a reason for her symptoms. 3 months ago she started to feel very depressed because of her physical symptoms and no answers for them. She reports severe lathery/fatigue, anhedonia, decreased appetite with 20 lbs weight loss in 3 weeks, increased sleep, decreased concentration with associated anxiety. NO psychosis. 3 weeks ago she had her first seizure, no precipitating events identified. She says that in the past year her fiance left her once, but later they got together again and . He has been supportive since then. Due to severe lethargy, depression and anhedonia, pt has not been able to care for her children, and family has stepped in to help. 2 1/2 weeks ago, her PCP started her on zoloft, 1/2 tab. After length discussion about her symptoms of depression and recent seizure, pt accepted to res-start and increase dose of zoloft, and start modafinil for energy/depression.         *Medical Review Of Symptoms (not dx conditions):   Review of Systems   Constitutional: Positive for malaise/fatigue.   Respiratory: Negative for cough.    Cardiovascular: Negative for chest pain.   Gastrointestinal: Positive for blood in stool. " "Negative for abdominal pain, constipation, diarrhea, nausea and vomiting.   Genitourinary: Negative for dysuria and hematuria.   Musculoskeletal: Negative for joint pain.   Skin: Negative for rash.   Neurological: Positive for headaches.   Psychiatric/Behavioral: Positive for depression. Negative for hallucinations, substance abuse and suicidal ideas. The patient is nervous/anxious. The patient does not have insomnia.          *Psychiatric Examination:   Vitals:   Vitals:    10/01/20 1600   BP: (!) 94/70   Pulse: 89   Resp: (!) 25   Temp: 36.4 °C (97.5 °F)   SpO2:        Appearance: appears stated age, fair grooming and hygiene, calm, cooperative, good eye contact  Abnormal movements: none  Gait/posture: normal  Speech: normal volume, tone and rhythm  Though process: linear and goal oriented  Associations: no loose associations  Thought content: denies AVH, no delusions or paranoia elicited, does not appear to be responding to internal stimuli, neither is internally preoccupied.   Judgement and Insight: fair/fair  Orientation:oriented to person, place, time and situation  Recent and Remote Memory: intact  Attention Span and Concentration: intact  Language: fluid   Fund of Knowledge: not tested   Mood and Affect:\"depressed\", dysthymic.   SI/HI:denies any active or passive SI/HI            MMSE score and/or clock drawing:not done        *PAST MEDICAL/PSYCH/FAMILY/SOCIAL(as reported by patient):       *medical hx:        TBI:none  SZ:started 3 weeks ago  Stroke: denies  Past Medical History:   Diagnosis Date   • Anxiety    • Depression    • Seizure disorder (HCC)      No past surgical history on file.     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   Alcohol: denies  Drugs:denies  Tobacco: 2 cigarettes a day.      *MEDICAL HX: labs, MARS, medications, etc were reviewed. " "Only those findings of potential interest to psychiatry are noted below:    *Current Medical issues:   see below     *Allergies:  No Known Allergies  *Current Medications:    Current Facility-Administered Medications:   •  sore throat spray (CHLORASEPTIC) 1 Spray, 1 Spray, Mouth/Throat, Q2HRS PRN, Grisel Quinn M.D., 1 Spray at 10/01/20 1201  •  acetaminophen (TYLENOL) tablet 650 mg, 650 mg, Oral, Q4HRS PRN, Grisel Quinn M.D.  •  Respiratory Therapy Consult, , Nebulization, Continuous RT, Jacoby Edwards D.O.  •  senna-docusate (PERICOLACE or SENOKOT S) 8.6-50 MG per tablet 2 Tab, 2 Tab, Enteral Tube, BID, Stopped at 20 0715 **AND** polyethylene glycol/lytes (MIRALAX) PACKET 1 Packet, 1 Packet, Enteral Tube, QDAY PRN **AND** magnesium hydroxide (MILK OF MAGNESIA) suspension 30 mL, 30 mL, Enteral Tube, QDAY PRN **AND** bisacodyl (DULCOLAX) suppository 10 mg, 10 mg, Rectal, QDAY PRN, Jacoby Edwards, D.O.  •  lactated ringers infusion, , Intravenous, Continuous, Grisel Quinn M.D., Last Rate: 30 mL/hr at 10/01/20 0944  •  tramadol (ULTRAM) 50 MG tablet 50 mg, 50 mg, Oral, Q6HRS PRN, Grisel Quinn M.D., 50 mg at 10/01/20 0940  *ECG: personally reviewed QTc 449  *Cranial Imaging: personally reviewed CT-CTA head:  1.  No large vessel occlusion or aneurysm identified.      Head CT: 1.  No acute intracranial abnormality.  DX-CHEST-PORTABLE (1 VIEW)   Final Result         1.  No acute cardiopulmonary disease.      CT-CTA HEAD WITH & W/O-POST PROCESS   Final Result         1.  No large vessel occlusion or aneurysm identified.      DX-CHEST-PORTABLE (1 VIEW)   Final Result         1.  No acute cardiopulmonary disease.      CT-HEAD W/O   Final Result         1.  No acute intracranial abnormality.      DX-CHEST-PORTABLE (1 VIEW)    (Results Pending)           EE20: normal EEG \"ELEVEN (11) typical convulsion like   spells were captured during the study, some of the events were   marked by the patient herself " "by pushing the event button. Events   consisting of patient becoming unresponsive with shaking in her   extremities, right hand or both hands, pelvic thrusting, body   contortion like movements. Events typically lasted about three   minutes or longer, with all events being similar in nature. The   patient appear post ictal after the event, unresponsive or   confused. Review of the veeg before, during and after the events   demonstrated a normal awake background. ALL the events captured   were PSYCHOGENIC and NON-EPILEPTIC IN NATURE. \"      *Labs:  Recent Results (from the past 48 hour(s))   HCG Qual Serum    Collection Time: 09/30/20  2:40 AM   Result Value Ref Range    Beta-Hcg Qualitative Serum Negative Negative   CBC WITH DIFFERENTIAL    Collection Time: 09/30/20  2:40 AM   Result Value Ref Range    WBC 8.0 4.8 - 10.8 K/uL    RBC 4.38 4.20 - 5.40 M/uL    Hemoglobin 11.7 (L) 12.0 - 16.0 g/dL    Hematocrit 37.8 37.0 - 47.0 %    MCV 86.3 81.4 - 97.8 fL    MCH 26.7 (L) 27.0 - 33.0 pg    MCHC 31.0 (L) 33.6 - 35.0 g/dL    RDW 49.5 35.9 - 50.0 fL    Platelet Count 321 164 - 446 K/uL    MPV 12.0 9.0 - 12.9 fL    Neutrophils-Polys 60.20 44.00 - 72.00 %    Lymphocytes 31.50 22.00 - 41.00 %    Monocytes 6.60 0.00 - 13.40 %    Eosinophils 0.90 0.00 - 6.90 %    Basophils 0.30 0.00 - 1.80 %    Immature Granulocytes 0.50 0.00 - 0.90 %    Nucleated RBC 0.00 /100 WBC    Neutrophils (Absolute) 4.81 2.00 - 7.15 K/uL    Lymphs (Absolute) 2.51 1.00 - 4.80 K/uL    Monos (Absolute) 0.53 0.00 - 0.85 K/uL    Eos (Absolute) 0.07 0.00 - 0.51 K/uL    Baso (Absolute) 0.02 0.00 - 0.12 K/uL    Immature Granulocytes (abs) 0.04 0.00 - 0.11 K/uL    NRBC (Absolute) 0.00 K/uL   COMP METABOLIC PANEL    Collection Time: 09/30/20  2:40 AM   Result Value Ref Range    Sodium 137 135 - 145 mmol/L    Potassium 4.1 3.6 - 5.5 mmol/L    Chloride 103 96 - 112 mmol/L    Co2 15 (L) 20 - 33 mmol/L    Anion Gap 19.0 (H) 7.0 - 16.0    Glucose 81 65 - 99 mg/dL    " Bun 10 8 - 22 mg/dL    Creatinine 0.66 0.50 - 1.40 mg/dL    Calcium 9.0 8.5 - 10.5 mg/dL    AST(SGOT) 21 12 - 45 U/L    ALT(SGPT) 12 2 - 50 U/L    Alkaline Phosphatase 66 30 - 99 U/L    Total Bilirubin <0.2 0.1 - 1.5 mg/dL    Albumin 4.2 3.2 - 4.9 g/dL    Total Protein 7.3 6.0 - 8.2 g/dL    Globulin 3.1 1.9 - 3.5 g/dL    A-G Ratio 1.4 g/dL   ESTIMATED GFR    Collection Time: 20  2:40 AM   Result Value Ref Range    GFR If African American >60 >60 mL/min/1.73 m 2    GFR If Non African American >60 >60 mL/min/1.73 m 2   EKG    Collection Time: 20  2:41 AM   Result Value Ref Range    Report       [facility] Emergency Dept.    Test Date:  2020  Pt Name:    JONATHAN DICKERSON                 Department: ER  MRN:        [id number]                      Room:       BL 15  Gender:     Female                       Technician: [id number]  :        1995                   Requested By:JONO TAVARES  Order #:    [id number]                    Reading MD: Jono Tavares    Measurements  Intervals                                Axis  Rate:       102                          P:          43  IA:         156                          QRS:        72  QRSD:       80                           T:          54  QT:         348  QTc:        454    Interpretive Statements  SINUS TACHYCARDIA  BASELINE WANDER IN LEAD(S) V4  Compared to ECG 2020 23:00:41  Sinus rhythm no longer present  Electronically Signed On 2020 3:18:42 PDT by Jono Tavares     CREATINE KINASE    Collection Time: 20  2:50 AM   Result Value Ref Range    CPK Total 50 0 - 154 U/L   ISTAT ARTERIAL BLOOD GAS    Collection Time: 20  3:50 AM   Result Value Ref Range    Ph 7.314 (L) 7.400 - 7.500    Pco2 36.7 26.0 - 37.0 mmHg    Po2 135 (H) 64 - 87 mmHg    Tco2 20 20 - 33 mmol/L    S02 99 93 - 99 %    Hco3 18.7 17.0 - 25.0 mmol/L    BE -7 (L) -4 - 3 mmol/L    Body Temp 37.0 C degrees    O2 Therapy 30 %    iPF Ratio 450     Ph Temp  Vesta 7.314 (L) 7.400 - 7.500    Pco2 Temp Co 36.7 26.0 - 37.0 mmHg    Po2 Temp Cor 135 (H) 64 - 87 mmHg    Specimen Arterial     Action Range Triggered NO     Inst. Qualified Patient YES    URINALYSIS    Collection Time: 20  4:15 AM    Specimen: Urine   Result Value Ref Range    Color Yellow     Character Clear     Specific Gravity 1.024 <1.035    Ph 6.5 5.0 - 8.0    Glucose Negative Negative mg/dL    Ketones Negative Negative mg/dL    Protein Negative Negative mg/dL    Bilirubin Negative Negative    Urobilinogen, Urine 1.0 Negative    Nitrite Negative Negative    Leukocyte Esterase Negative Negative    Occult Blood Negative Negative    Micro Urine Req see below    URINE DRUG SCREEN    Collection Time: 20  4:15 AM   Result Value Ref Range    Amphetamines Urine Negative Negative    Barbiturates Negative Negative    Benzodiazepines Positive (A) Negative    Cocaine Metabolite Negative Negative    Methadone Negative Negative    Opiates Negative Negative    Oxycodone Negative Negative    Phencyclidine -Pcp Negative Negative    Propoxyphene Negative Negative    Cannabinoid Metab Negative Negative   COVID/SARS CoV-2 PCR    Collection Time: 20  4:40 AM    Specimen: Nasopharyngeal; Respirate   Result Value Ref Range    COVID Order Status Received    SARS-CoV-2, PCR (In-House)    Collection Time: 20  4:40 AM   Result Value Ref Range    SARS-CoV-2 Source NP Swab     SARS-CoV-2 by PCR NotDetected    EKG (NOW)    Collection Time: 20  8:14 AM   Result Value Ref Range    Report       [facility]    Test Date:  2020  Pt Name:    JONATHAN DICKERSON                 Department:   MRN:        [id number]                      Room:       [facility]  Gender:     Female                       Technician: [id number]  :        1995                   Requested By:TINO TAVARES  Order #:    [id number]                    Reading MD: Mohan Dinh MD    Measurements  Intervals                                 Axis  Rate:       71                           P:          44  CA:         162                          QRS:        70  QRSD:       87                           T:          58  QT:         413  QTc:        449    Interpretive Statements  SINUS RHYTHM  EARLY REPOLARIZATION  Electronically Signed On 9- 9:00:23 PDT by Mohan Dinh MD     LACTIC ACID    Collection Time: 09/30/20 12:55 PM   Result Value Ref Range    Lactic Acid 1.3 0.5 - 2.0 mmol/L   CBC with Differential    Collection Time: 10/01/20  5:40 AM   Result Value Ref Range    WBC 8.1 4.8 - 10.8 K/uL    RBC 3.86 (L) 4.20 - 5.40 M/uL    Hemoglobin 10.3 (L) 12.0 - 16.0 g/dL    Hematocrit 33.1 (L) 37.0 - 47.0 %    MCV 85.8 81.4 - 97.8 fL    MCH 26.7 (L) 27.0 - 33.0 pg    MCHC 31.1 (L) 33.6 - 35.0 g/dL    RDW 49.0 35.9 - 50.0 fL    Platelet Count 284 164 - 446 K/uL    MPV 11.3 9.0 - 12.9 fL    Neutrophils-Polys 66.70 44.00 - 72.00 %    Lymphocytes 25.40 22.00 - 41.00 %    Monocytes 6.80 0.00 - 13.40 %    Eosinophils 0.60 0.00 - 6.90 %    Basophils 0.10 0.00 - 1.80 %    Immature Granulocytes 0.40 0.00 - 0.90 %    Nucleated RBC 0.00 /100 WBC    Neutrophils (Absolute) 5.40 2.00 - 7.15 K/uL    Lymphs (Absolute) 2.06 1.00 - 4.80 K/uL    Monos (Absolute) 0.55 0.00 - 0.85 K/uL    Eos (Absolute) 0.05 0.00 - 0.51 K/uL    Baso (Absolute) 0.01 0.00 - 0.12 K/uL    Immature Granulocytes (abs) 0.03 0.00 - 0.11 K/uL    NRBC (Absolute) 0.00 K/uL   Basic Metabolic Panel (BMP)    Collection Time: 10/01/20  5:40 AM   Result Value Ref Range    Sodium 136 135 - 145 mmol/L    Potassium 3.6 3.6 - 5.5 mmol/L    Chloride 103 96 - 112 mmol/L    Co2 20 20 - 33 mmol/L    Glucose 92 65 - 99 mg/dL    Bun 5 (L) 8 - 22 mg/dL    Creatinine 0.48 (L) 0.50 - 1.40 mg/dL    Calcium 8.8 8.5 - 10.5 mg/dL    Anion Gap 13.0 7.0 - 16.0   Magnesium    Collection Time: 10/01/20  5:40 AM   Result Value Ref Range    Magnesium 1.9 1.5 - 2.5 mg/dL   Phosphorus    Collection Time: 10/01/20  5:40 AM    Result Value Ref Range    Phosphorus 3.5 2.5 - 4.5 mg/dL   ESTIMATED GFR    Collection Time: 10/01/20  5:40 AM   Result Value Ref Range    GFR If African American >60 >60 mL/min/1.73 m 2    GFR If Non African American >60 >60 mL/min/1.73 m 2       Recent Labs     09/30/20  0240 10/01/20  0540   WBC 8.0 8.1   RBC 4.38 3.86*   HEMOGLOBIN 11.7* 10.3*   HEMATOCRIT 37.8 33.1*   MCV 86.3 85.8   MCH 26.7* 26.7*   RDW 49.5 49.0   PLATELETCT 321 284   MPV 12.0 11.3   NEUTSPOLYS 60.20 66.70   LYMPHOCYTES 31.50 25.40   MONOCYTES 6.60 6.80   EOSINOPHILS 0.90 0.60   BASOPHILS 0.30 0.10     Lab Results   Component Value Date/Time    SODIUM 136 10/01/2020 05:40 AM    POTASSIUM 3.6 10/01/2020 05:40 AM    CHLORIDE 103 10/01/2020 05:40 AM    CO2 20 10/01/2020 05:40 AM    GLUCOSE 92 10/01/2020 05:40 AM    BUN 5 (L) 10/01/2020 05:40 AM    CREATININE 0.48 (L) 10/01/2020 05:40 AM         No results found for: BREATHALIZER  No components found for: BLOODALCOHOL   Lab Results   Component Value Date/Time    AMPHUR Negative 09/30/2020 0415    BARBSURINE Negative 09/30/2020 0415    BENZODIAZU Positive (A) 09/30/2020 0415    COCAINEMET Negative 09/30/2020 0415    METHADONE Negative 09/30/2020 0415    OPIATES Negative 09/30/2020 0415    OXYCODN Negative 09/30/2020 0415    PCPURINE Negative 09/30/2020 0415    PROPOXY Negative 09/30/2020 0415    CANNABINOID Negative 09/30/2020 0415     Lab Results   Component Value Date/Time    FREET4 1.16 07/15/2020 1752           Dx:  Conversion disorder- psychogenic seizure  MDD, severe, without psychotic features, with anxiety features.         Plan:  1- Legal hold:n/a  2- Psychotropic medications: re-start zoloft 50mg PO daily for depression and anxiety  Start Modafinil 100mg Po daily as an adjunct treatment for depression and fatigue.  3- Pt was advised to follow up with outpatient psychiatrist in the community.   Requested SW to provide pt with referrals/resources to outpatient psychiatric services in  the community  Pt will greatly benefit from psychotherapy treatment in the outpatient setting, which is the preferred treatment for conversion disorders.   4- Psychiatry signing off.     Thank you for the consult.
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
## 2020-10-01 NOTE — PROGRESS NOTES
Critical Care Progress Note    Date of admission  9/30/2020    Chief Complaint  24 y.o. female admitted 9/30/2020 with seizures requiring intubation.    Hospital Course    Ms. Troncoso is a 24 year old lady with the past medical history of anxiety/depression who has a history of psychogenic seizures diagnosed by neurology at her last hospitalization who came to the ER with seizure activity and was intubated.  Neurology evaluated and felt that these were psychogenic and she was extubated the same day.        Interval Problem Update  Reviewed last 24 hour events:   - pt had several seizure events overnight that she told the nursing staff about   - no other issues overnight   - a/ox4   - SR/ST 90-100s   - SBP 80-90s   - Tmax 98.7   - tolerating liquid diet   - adequate UOP with winters   - no BM yet   - O2 at 1 lpm NC   - neurology stopped all seizure medications    Review of Systems  Review of Systems   Constitutional: Negative for chills, fever and malaise/fatigue.   HENT: Negative for congestion and sore throat.    Eyes: Negative for blurred vision and double vision.   Respiratory: Negative for cough and shortness of breath.    Cardiovascular: Negative for chest pain and leg swelling.   Gastrointestinal: Negative for diarrhea, nausea and vomiting.   Genitourinary: Negative for frequency and urgency.   Musculoskeletal: Negative for back pain and neck pain.   Skin: Negative for rash.   Neurological: Positive for seizures. Negative for headaches.   Endo/Heme/Allergies: Does not bruise/bleed easily.   Psychiatric/Behavioral: Negative for depression and substance abuse. The patient is not nervous/anxious.         Vital Signs for last 24 hours   Temp:  [36 °C (96.8 °F)-37.1 °C (98.7 °F)] 37.1 °C (98.7 °F)  Pulse:  [] 90  Resp:  [9-36] 11  BP: ()/(50-72) 83/50  SpO2:  [87 %-100 %] 97 %    Hemodynamic parameters for last 24 hours       Respiratory Information for the last 24 hours  Vent Mode: APVCMV  Rate  (breaths/min): 20  Vt Target (mL): 400  PEEP/CPAP: 8  MAP: 11  Control VTE (exp VT): 402    Physical Exam   Physical Exam  Vitals signs and nursing note reviewed.   Constitutional:       General: She is not in acute distress.     Appearance: Normal appearance. She is not toxic-appearing or diaphoretic.   HENT:      Head: Normocephalic and atraumatic.      Right Ear: External ear normal.      Left Ear: External ear normal.      Nose: Nose normal. No rhinorrhea.      Mouth/Throat:      Mouth: Mucous membranes are moist.      Pharynx: Oropharynx is clear. No oropharyngeal exudate.   Eyes:      General: No scleral icterus.     Extraocular Movements: Extraocular movements intact.      Conjunctiva/sclera: Conjunctivae normal.      Pupils: Pupils are equal, round, and reactive to light.   Neck:      Musculoskeletal: Normal range of motion and neck supple.   Cardiovascular:      Rate and Rhythm: Normal rate and regular rhythm.      Pulses: Normal pulses.      Heart sounds: Normal heart sounds. No murmur.   Pulmonary:      Effort: Pulmonary effort is normal. No respiratory distress.      Breath sounds: No wheezing.   Chest:      Chest wall: No tenderness.   Abdominal:      General: Bowel sounds are normal. There is no distension.      Palpations: Abdomen is soft.      Tenderness: There is no abdominal tenderness. There is no rebound.   Musculoskeletal: Normal range of motion.      Right lower leg: No edema.      Left lower leg: No edema.   Lymphadenopathy:      Cervical: No cervical adenopathy.   Skin:     General: Skin is warm and dry.      Capillary Refill: Capillary refill takes less than 2 seconds.      Findings: No rash.   Neurological:      Mental Status: She is alert and oriented to person, place, and time.      Cranial Nerves: No cranial nerve deficit.      Sensory: No sensory deficit.      Motor: No weakness.   Psychiatric:         Mood and Affect: Mood normal.         Behavior: Behavior normal.         Thought  Content: Thought content normal.         Medications  Current Facility-Administered Medications   Medication Dose Route Frequency Provider Last Rate Last Dose   • Respiratory Therapy Consult   Nebulization Continuous RT Jacoby Edwards D.O.       • famotidine (PEPCID) tablet 20 mg  20 mg Enteral Tube Q12HRS Jacoby Edwards D.O.   Stopped at 09/30/20 0715    Or   • famotidine (PEPCID) injection 20 mg  20 mg Intravenous Q12HRS Jacoby Edwards D.O.       • senna-docusate (PERICOLACE or SENOKOT S) 8.6-50 MG per tablet 2 Tab  2 Tab Enteral Tube BID Jacoby Edwards D.O.   Stopped at 09/30/20 0715    And   • polyethylene glycol/lytes (MIRALAX) PACKET 1 Packet  1 Packet Enteral Tube QDAY PRN Jacoby Edwards D.O.        And   • magnesium hydroxide (MILK OF MAGNESIA) suspension 30 mL  30 mL Enteral Tube QDAY PRN Jacoby Edwards D.O.        And   • bisacodyl (DULCOLAX) suppository 10 mg  10 mg Rectal QDAY PRN Jacoby Edwards D.O.       • MD Alert...ICU Electrolyte Replacement per Pharmacy   Other PHARMACY TO DOSE Jacoby Edwards D.O.       • lactated ringers infusion   Intravenous Continuous Jacoby Edwards D.O. 100 mL/hr at 09/30/20 1810     • tramadol (ULTRAM) 50 MG tablet 50 mg  50 mg Oral Q6HRS PRN Grisel Quinn M.D.   50 mg at 10/01/20 0151       Fluids    Intake/Output Summary (Last 24 hours) at 10/1/2020 0633  Last data filed at 10/1/2020 0600  Gross per 24 hour   Intake 2573.34 ml   Output 2335 ml   Net 238.34 ml       Laboratory  Recent Labs     09/30/20  0350   ISTATAPH 7.314*   ISTATAPCO2 36.7   ISTATAPO2 135*   ISTATATCO2 20   RCYTUSC2BPS 99   ISTATARTHCO3 18.7   ISTATARTBE -7*   ISTATTEMP 37.0 C   ISTATFIO2 30   ISTATSPEC Arterial   ISTATAPHTC 7.314*   YGWFJCSS7YX 135*     Recent Labs     09/30/20  0250   CPKTOTAL 50     Recent Labs     09/30/20  0240   SODIUM 137   POTASSIUM 4.1   CHLORIDE 103   CO2 15*   BUN 10   CREATININE 0.66   CALCIUM 9.0     Recent Labs     09/30/20  0240   ALTSGPT 12   ASTSGOT 21   ALKPHOSPHAT 66   TBILIRUBIN <0.2    GLUCOSE 81     Recent Labs     09/30/20  0240 10/01/20  0540   WBC 8.0 8.1   NEUTSPOLYS 60.20 66.70   LYMPHOCYTES 31.50 25.40   MONOCYTES 6.60 6.80   EOSINOPHILS 0.90 0.60   BASOPHILS 0.30 0.10   ASTSGOT 21  --    ALTSGPT 12  --    ALKPHOSPHAT 66  --    TBILIRUBIN <0.2  --      Recent Labs     09/30/20  0240 10/01/20  0540   RBC 4.38 3.86*   HEMOGLOBIN 11.7* 10.3*   HEMATOCRIT 37.8 33.1*   PLATELETCT 321 284       Imaging  CTA head: negative    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 In review of yesterday's note (9/30/2020), there are no changes except as documented above.     Discussed patient condition and risk of morbidity and/or mortality with Hospitalist, RN, RT, Pharmacy, Charge nurse / hot rounds, Patient and neurology    Pt is no longer intubated and does not require ICU level of care.  Case discussed with ICU rounder, Dr. Gallardo, who will assume care of patient today.  The critical care team will sign off at this time.

## 2020-10-01 NOTE — ASSESSMENT & PLAN NOTE
The patient states she has been suffering from this for several months now, no definitive answer was found in the past,  We will review previous work-up and consider further testing

## 2020-10-01 NOTE — ASSESSMENT & PLAN NOTE
Patient with multiple neurologic evaluations and work-up essentially all normal, referred to psychiatry for evaluation

## 2020-10-01 NOTE — PROGRESS NOTES
1 Minute of seizure like activity, self resolving. Pt shaking upper extremities, rolled onto side, moaning, unresponsive to verbal/pain. Dr. Quinn notified.

## 2020-10-01 NOTE — DIETARY
"Nutrition services: Day 1 of admit. 23 yo female admitted with seizure.  Consult for malnutrition score of 3 of admitting screen - weight loss and poor appetite  PTA.     Evaluation:  1. Pt reports her usual body weight is 120-130#. She states she has lost about 20# in 2-3 weeks secondary to n/v.  2. Her admit scale weight was 145# indicating a weight gain over her usual weight. She reports having given birth 18 months ago and weight loss since then.   3. Currently on a full liquid diet stating she does not have much of an appetite. When asked what she would like to eat she said \"pasta\" and is agreeable to diet advancement.  4. Discussed with RN who will advance to regular diet.    Malnutrition risk: na    Recommendations/Plan:  1. encourage intake of meals   2. document intake of all meals as % taken in ADL's to provide interdisciplinary communication across all shifts   3. monitor daily weights  4. Nutrition rep will continue to see patient for ongoing meal and snack preferences.         "

## 2020-10-02 ENCOUNTER — APPOINTMENT (OUTPATIENT)
Dept: RADIOLOGY | Facility: MEDICAL CENTER | Age: 25
DRG: 880 | End: 2020-10-02
Attending: INTERNAL MEDICINE
Payer: COMMERCIAL

## 2020-10-02 VITALS
SYSTOLIC BLOOD PRESSURE: 89 MMHG | DIASTOLIC BLOOD PRESSURE: 60 MMHG | HEIGHT: 61 IN | BODY MASS INDEX: 29.22 KG/M2 | TEMPERATURE: 98.4 F | HEART RATE: 71 BPM | WEIGHT: 154.76 LBS | OXYGEN SATURATION: 97 % | RESPIRATION RATE: 22 BRPM

## 2020-10-02 PROBLEM — J96.01 ACUTE RESPIRATORY FAILURE WITH HYPOXIA (HCC): Status: RESOLVED | Noted: 2020-09-30 | Resolved: 2020-10-02

## 2020-10-02 LAB
ANION GAP SERPL CALC-SCNC: 12 MMOL/L (ref 7–16)
BASOPHILS # BLD AUTO: 0.3 % (ref 0–1.8)
BASOPHILS # BLD: 0.02 K/UL (ref 0–0.12)
BUN SERPL-MCNC: 5 MG/DL (ref 8–22)
CALCIUM SERPL-MCNC: 8.8 MG/DL (ref 8.5–10.5)
CHLORIDE SERPL-SCNC: 100 MMOL/L (ref 96–112)
CO2 SERPL-SCNC: 22 MMOL/L (ref 20–33)
CORTIS SERPL-MCNC: 2 UG/DL (ref 0–23)
CREAT SERPL-MCNC: 0.58 MG/DL (ref 0.5–1.4)
EOSINOPHIL # BLD AUTO: 0.12 K/UL (ref 0–0.51)
EOSINOPHIL NFR BLD: 1.6 % (ref 0–6.9)
ERYTHROCYTE [DISTWIDTH] IN BLOOD BY AUTOMATED COUNT: 49.7 FL (ref 35.9–50)
ERYTHROCYTE [SEDIMENTATION RATE] IN BLOOD BY WESTERGREN METHOD: 52 MM/HOUR (ref 0–20)
FERRITIN SERPL-MCNC: 55.1 NG/ML (ref 10–291)
GLUCOSE SERPL-MCNC: 86 MG/DL (ref 65–99)
HCT VFR BLD AUTO: 34.7 % (ref 37–47)
HGB BLD-MCNC: 10.8 G/DL (ref 12–16)
IMM GRANULOCYTES # BLD AUTO: 0.02 K/UL (ref 0–0.11)
IMM GRANULOCYTES NFR BLD AUTO: 0.3 % (ref 0–0.9)
IRON SATN MFR SERPL: 11 % (ref 15–55)
IRON SERPL-MCNC: 32 UG/DL (ref 40–170)
LYMPHOCYTES # BLD AUTO: 2.3 K/UL (ref 1–4.8)
LYMPHOCYTES NFR BLD: 30.5 % (ref 22–41)
MAGNESIUM SERPL-MCNC: 2.3 MG/DL (ref 1.5–2.5)
MCH RBC QN AUTO: 27.3 PG (ref 27–33)
MCHC RBC AUTO-ENTMCNC: 31.1 G/DL (ref 33.6–35)
MCV RBC AUTO: 87.6 FL (ref 81.4–97.8)
MONOCYTES # BLD AUTO: 0.53 K/UL (ref 0–0.85)
MONOCYTES NFR BLD AUTO: 7 % (ref 0–13.4)
NEUTROPHILS # BLD AUTO: 4.55 K/UL (ref 2–7.15)
NEUTROPHILS NFR BLD: 60.3 % (ref 44–72)
NRBC # BLD AUTO: 0 K/UL
NRBC BLD-RTO: 0 /100 WBC
PHOSPHATE SERPL-MCNC: 3.4 MG/DL (ref 2.5–4.5)
PLATELET # BLD AUTO: 271 K/UL (ref 164–446)
PMV BLD AUTO: 10.9 FL (ref 9–12.9)
POTASSIUM SERPL-SCNC: 3.5 MMOL/L (ref 3.6–5.5)
RBC # BLD AUTO: 3.96 M/UL (ref 4.2–5.4)
SODIUM SERPL-SCNC: 134 MMOL/L (ref 135–145)
T4 FREE SERPL-MCNC: 1.15 NG/DL (ref 0.93–1.7)
TIBC SERPL-MCNC: 283 UG/DL (ref 250–450)
TREPONEMA PALLIDUM IGG+IGM AB [PRESENCE] IN SERUM OR PLASMA BY IMMUNOASSAY: NORMAL
TSH SERPL DL<=0.005 MIU/L-ACNC: 1.84 UIU/ML (ref 0.38–5.33)
UIBC SERPL-MCNC: 251 UG/DL (ref 110–370)
WBC # BLD AUTO: 7.5 K/UL (ref 4.8–10.8)

## 2020-10-02 PROCEDURE — A9270 NON-COVERED ITEM OR SERVICE: HCPCS | Performed by: INTERNAL MEDICINE

## 2020-10-02 PROCEDURE — 82533 TOTAL CORTISOL: CPT

## 2020-10-02 PROCEDURE — 83550 IRON BINDING TEST: CPT

## 2020-10-02 PROCEDURE — A9270 NON-COVERED ITEM OR SERVICE: HCPCS | Performed by: HOSPITALIST

## 2020-10-02 PROCEDURE — 85652 RBC SED RATE AUTOMATED: CPT

## 2020-10-02 PROCEDURE — 71045 X-RAY EXAM CHEST 1 VIEW: CPT

## 2020-10-02 PROCEDURE — 82728 ASSAY OF FERRITIN: CPT

## 2020-10-02 PROCEDURE — 700102 HCHG RX REV CODE 250 W/ 637 OVERRIDE(OP): Performed by: PSYCHIATRY & NEUROLOGY

## 2020-10-02 PROCEDURE — 700102 HCHG RX REV CODE 250 W/ 637 OVERRIDE(OP): Performed by: INTERNAL MEDICINE

## 2020-10-02 PROCEDURE — A9270 NON-COVERED ITEM OR SERVICE: HCPCS | Performed by: PSYCHIATRY & NEUROLOGY

## 2020-10-02 PROCEDURE — 700102 HCHG RX REV CODE 250 W/ 637 OVERRIDE(OP): Performed by: HOSPITALIST

## 2020-10-02 PROCEDURE — 99239 HOSP IP/OBS DSCHRG MGMT >30: CPT | Performed by: HOSPITALIST

## 2020-10-02 PROCEDURE — 84100 ASSAY OF PHOSPHORUS: CPT

## 2020-10-02 PROCEDURE — 84443 ASSAY THYROID STIM HORMONE: CPT

## 2020-10-02 PROCEDURE — 83540 ASSAY OF IRON: CPT

## 2020-10-02 PROCEDURE — 85025 COMPLETE CBC W/AUTO DIFF WBC: CPT

## 2020-10-02 PROCEDURE — 80048 BASIC METABOLIC PNL TOTAL CA: CPT

## 2020-10-02 PROCEDURE — 83735 ASSAY OF MAGNESIUM: CPT

## 2020-10-02 PROCEDURE — 86780 TREPONEMA PALLIDUM: CPT

## 2020-10-02 PROCEDURE — 84439 ASSAY OF FREE THYROXINE: CPT

## 2020-10-02 RX ORDER — POTASSIUM CHLORIDE 20 MEQ/1
40 TABLET, EXTENDED RELEASE ORAL ONCE
Status: COMPLETED | OUTPATIENT
Start: 2020-10-02 | End: 2020-10-02

## 2020-10-02 RX ORDER — BUTALBITAL, ACETAMINOPHEN AND CAFFEINE 50; 325; 40 MG/1; MG/1; MG/1
1 TABLET ORAL EVERY 4 HOURS PRN
Qty: 20 TAB | Refills: 0 | Status: SHIPPED | OUTPATIENT
Start: 2020-10-02 | End: 2020-10-22

## 2020-10-02 RX ORDER — OXYCODONE HYDROCHLORIDE 5 MG/1
5-10 TABLET ORAL EVERY 4 HOURS PRN
Status: DISCONTINUED | OUTPATIENT
Start: 2020-10-02 | End: 2020-10-02 | Stop reason: HOSPADM

## 2020-10-02 RX ORDER — MODAFINIL 100 MG/1
100 TABLET ORAL EVERY MORNING
Qty: 30 TAB | Refills: 1 | Status: SHIPPED | OUTPATIENT
Start: 2020-10-02 | End: 2020-11-01

## 2020-10-02 RX ORDER — LANOLIN ALCOHOL/MO/W.PET/CERES
325 CREAM (GRAM) TOPICAL
Qty: 90 TAB | Refills: 3 | Status: SHIPPED | OUTPATIENT
Start: 2020-10-02 | End: 2021-11-01

## 2020-10-02 RX ADMIN — DOCUSATE SODIUM 50 MG AND SENNOSIDES 8.6 MG 2 TABLET: 8.6; 5 TABLET, FILM COATED ORAL at 05:23

## 2020-10-02 RX ADMIN — POTASSIUM CHLORIDE 40 MEQ: 1500 TABLET, EXTENDED RELEASE ORAL at 08:44

## 2020-10-02 RX ADMIN — OXYCODONE 10 MG: 5 TABLET ORAL at 04:20

## 2020-10-02 RX ADMIN — OXYCODONE 10 MG: 5 TABLET ORAL at 08:44

## 2020-10-02 RX ADMIN — OXYCODONE 10 MG: 5 TABLET ORAL at 00:11

## 2020-10-02 RX ADMIN — SERTRALINE 50 MG: 50 TABLET, FILM COATED ORAL at 05:18

## 2020-10-02 RX ADMIN — ACETAMINOPHEN 650 MG: 325 TABLET, FILM COATED ORAL at 05:18

## 2020-10-02 ASSESSMENT — FIBROSIS 4 INDEX: FIB4 SCORE: 0.58

## 2020-10-02 ASSESSMENT — PAIN DESCRIPTION - PAIN TYPE
TYPE: ACUTE PAIN

## 2020-10-02 NOTE — DISCHARGE INSTRUCTIONS
Discharge Instructions    Discharged to home by car with relative. Discharged via wheelchair, hospital escort: Yes.  Special equipment needed: Not Applicable    Be sure to schedule a follow-up appointment with your primary care doctor or any specialists as instructed.     Discharge Plan:        I understand that a diet low in cholesterol, fat, and sodium is recommended for good health. Unless I have been given specific instructions below for another diet, I accept this instruction as my diet prescription.   Other diet: regular    Special Instructions: None    · Is patient discharged on Warfarin / Coumadin?   No     Depression / Suicide Risk    As you are discharged from this [facility] facility, it is important to learn how to keep safe from harming yourself.    Recognize the warning signs:  · Abrupt changes in personality, positive or negative- including increase in energy   · Giving away possessions  · Change in eating patterns- significant weight changes-  positive or negative  · Change in sleeping patterns- unable to sleep or sleeping all the time   · Unwillingness or inability to communicate  · Depression  · Unusual sadness, discouragement and loneliness  · Talk of wanting to die  · Neglect of personal appearance   · Rebelliousness- reckless behavior  · Withdrawal from people/activities they love  · Confusion- inability to concentrate     If you or a loved one observes any of these behaviors or has concerns about self-harm, here's what you can do:  · Talk about it- your feelings and reasons for harming yourself  · Remove any means that you might use to hurt yourself (examples: pills, rope, extension cords, firearm)  · Get professional help from the community (Mental Health, Substance Abuse, psychological counseling)  · Do not be alone:Call your Safe Contact- someone whom you trust who will be there for you.  · Call your local CRISIS HOTLINE 536-8707 or 179-024-2279  · Call your local Children's Mobile Crisis  Response Team [facility] (586) 172-3095 or www.[employer] Times  · Call the toll free National Suicide Prevention Hotlines   · National Suicide Prevention Lifeline 037-138-YAZZ (7272)  · National Hope Line Network 800-SUICIDE (274-5421)    Physician Discharge Instructions:  Discharge Diagnosis: Pseudoseizures, possible migraine headaches, conversion disorder  Proceed to discharge/transfer  to home  Take Rx/Prescriptions as prescribed and reconciled per AVS  For OTC Medication consult with your Pharmacist first.  Diet: As tolerated  Activity: As tolerated  F/u with PCP within 3-10 days at home location, ask for f/u prescriptions by PCP  F/u with Specialty MD: Neurology as planned at [facility]  Follow-up with psychiatry suggested to get reevaluated and considered for psychotherapy  For new, severe symptoms refer to the next Emergency Care or call your Physician.  Controlled Substance History was reviewed if new Rx was issued.    Kuldip Gallardo MD

## 2020-10-02 NOTE — PROGRESS NOTES
Brief Neurology Progress Note  Date of Service 10/1/20    Follow-up for 24-year old female with psychogenic seizures; patient seen by Psych team this morning (10/1), per medical record review, was restarted on her Zoloft, started on Provigil (Modafinil), presumably for lethargy related to anhedonia. Awaiting full consultation note for full recommendations/plan, appreciated recs and input. Patient had at least 2 psychogenic seizure like events overnight, both of which ceased spontaneously (per nursing note).     At this time, will defer all other medical management to primary team and to psychiatry. Please refer to my note date 9/30/20 for full assessment, recommendations, and plan.     The plan of care above has been discussed with Dr. Gomez. Please call with questions.     CYNTHIA Toure.
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
## 2020-10-02 NOTE — CARE PLAN
Problem: Safety  Goal: Will remain free from injury  Outcome: PROGRESSING AS EXPECTED   Bed alarm on, educated on use of call light, anti skid socks in place     Problem: Pain Management  Goal: Pain level will decrease to patient's comfort goal  Outcome: PROGRESSING AS EXPECTED   Environmental changes and medications per mar, assessing Q2

## 2020-10-02 NOTE — PROGRESS NOTES
Attending Hospitalist is Dr Gallardo starting at 0700. Please contact this physician for orders, updates or questions today.

## 2020-10-02 NOTE — PROGRESS NOTES
"Patient had 2 episodes of \"seizure like activity\". VSS. Oxygen placed on patient for comfort. Post seizure activity patient AxOx4.     Dr. Edwards at bedside no new orders.     "

## 2020-10-02 NOTE — DISCHARGE PLANNING
Care Transition Team Assessment    In the case of an emergency, pt's legal NOK is spouse, Driss Akbar [phone]     DC order has been placed. LSW spoke with bedside RN who reports no needs for d/c. Spouse to provide transportation and support.     Consult for OP psych resources. Met with pt who declined resources.     No additional needs.     Care Transition Team Assessment    Information Source  Orientation : Oriented x 4  Information Given By: Patient  Who is responsible for making decisions for patient? : Patient    Readmission Evaluation  Is this a readmission?: No    Elopement Risk  Legal Hold: No  Ambulatory or Self Mobile in Wheelchair: No-Not an Elopement Risk  Disoriented: No  Psychiatric Symptoms: None  History of Wandering: No  Elopement this Admit: No  Vocalizing Wanting to Leave: No  Displays Behaviors, Body Language Wanting to Leave: No-Not at Risk for Elopement  Elopement Risk: Not at Risk for Elopement    Interdisciplinary Discharge Planning  Patient or legal guardian wants to designate a caregiver: No    Discharge Preparedness  What is your plan after discharge?: Home with help  What are your discharge supports?: Spouse  Prior Functional Level: Ambulatory, Drives Self, Independent with Activities of Daily Living, Independent with Medication Management    Functional Assesment  Prior Functional Level: Ambulatory, Drives Self, Independent with Activities of Daily Living, Independent with Medication Management    Finances  Financial Barriers to Discharge: No  Prescription Coverage: Yes    Advance Directive  Advance Directive?: None  Advance Directive offered?: AD Booklet refused    Domestic Abuse  Have you ever been the victim of abuse or violence?: No  Physical Abuse or Sexual Abuse: No  Verbal Abuse or Emotional Abuse: No  Possible Abuse/Neglect Reported to:: Not Applicable    Psychological Assessment  History of Substance Abuse: None  History of Psychiatric Problems: No  Non-compliant with  Treatment: No  Newly Diagnosed Illness: Yes    Discharge Risks or Barriers  Discharge risks or barriers?: No    Anticipated Discharge Information  Discharge Disposition: Discharged to home/self care (01)(With family support)  Discharge Address: [address] [place] [address]  Discharge Contact Phone Number: [phone]

## 2020-10-20 ENCOUNTER — APPOINTMENT (OUTPATIENT)
Dept: RADIOLOGY | Facility: MEDICAL CENTER | Age: 25
End: 2020-10-20
Attending: EMERGENCY MEDICINE
Payer: COMMERCIAL

## 2020-10-20 ENCOUNTER — HOSPITAL ENCOUNTER (EMERGENCY)
Facility: MEDICAL CENTER | Age: 25
End: 2020-10-20
Attending: EMERGENCY MEDICINE
Payer: COMMERCIAL

## 2020-10-20 VITALS
BODY MASS INDEX: 28.32 KG/M2 | WEIGHT: 150 LBS | OXYGEN SATURATION: 96 % | DIASTOLIC BLOOD PRESSURE: 52 MMHG | RESPIRATION RATE: 19 BRPM | SYSTOLIC BLOOD PRESSURE: 90 MMHG | TEMPERATURE: 98.5 F | HEART RATE: 76 BPM | HEIGHT: 61 IN

## 2020-10-20 DIAGNOSIS — R56.9 SEIZURE-LIKE ACTIVITY (HCC): ICD-10-CM

## 2020-10-20 LAB
ALBUMIN SERPL BCP-MCNC: 4.4 G/DL (ref 3.2–4.9)
ALBUMIN/GLOB SERPL: 1.4 G/DL
ALP SERPL-CCNC: 63 U/L (ref 30–99)
ALT SERPL-CCNC: 15 U/L (ref 2–50)
ANION GAP SERPL CALC-SCNC: 14 MMOL/L (ref 7–16)
AST SERPL-CCNC: 18 U/L (ref 12–45)
BASOPHILS # BLD AUTO: 0.5 % (ref 0–1.8)
BASOPHILS # BLD: 0.03 K/UL (ref 0–0.12)
BILIRUB SERPL-MCNC: 0.2 MG/DL (ref 0.1–1.5)
BUN SERPL-MCNC: 9 MG/DL (ref 8–22)
CALCIUM SERPL-MCNC: 9.1 MG/DL (ref 8.5–10.5)
CHLORIDE SERPL-SCNC: 103 MMOL/L (ref 96–112)
CO2 SERPL-SCNC: 20 MMOL/L (ref 20–33)
CREAT SERPL-MCNC: 0.7 MG/DL (ref 0.5–1.4)
EKG IMPRESSION: NORMAL
EOSINOPHIL # BLD AUTO: 0.06 K/UL (ref 0–0.51)
EOSINOPHIL NFR BLD: 1 % (ref 0–6.9)
ERYTHROCYTE [DISTWIDTH] IN BLOOD BY AUTOMATED COUNT: 45.6 FL (ref 35.9–50)
GLOBULIN SER CALC-MCNC: 3.1 G/DL (ref 1.9–3.5)
GLUCOSE SERPL-MCNC: 99 MG/DL (ref 65–99)
HCT VFR BLD AUTO: 35.7 % (ref 37–47)
HGB BLD-MCNC: 11.5 G/DL (ref 12–16)
IMM GRANULOCYTES # BLD AUTO: 0.01 K/UL (ref 0–0.11)
IMM GRANULOCYTES NFR BLD AUTO: 0.2 % (ref 0–0.9)
LACTATE BLD-SCNC: 1.4 MMOL/L (ref 0.5–2)
LYMPHOCYTES # BLD AUTO: 2.33 K/UL (ref 1–4.8)
LYMPHOCYTES NFR BLD: 39.8 % (ref 22–41)
MCH RBC QN AUTO: 27.1 PG (ref 27–33)
MCHC RBC AUTO-ENTMCNC: 32.2 G/DL (ref 33.6–35)
MCV RBC AUTO: 84 FL (ref 81.4–97.8)
MONOCYTES # BLD AUTO: 0.49 K/UL (ref 0–0.85)
MONOCYTES NFR BLD AUTO: 8.4 % (ref 0–13.4)
NEUTROPHILS # BLD AUTO: 2.94 K/UL (ref 2–7.15)
NEUTROPHILS NFR BLD: 50.1 % (ref 44–72)
NRBC # BLD AUTO: 0 K/UL
NRBC BLD-RTO: 0 /100 WBC
PLATELET # BLD AUTO: 348 K/UL (ref 164–446)
PMV BLD AUTO: 11.1 FL (ref 9–12.9)
POTASSIUM SERPL-SCNC: 3.3 MMOL/L (ref 3.6–5.5)
PROLACTIN SERPL-MCNC: 11.9 NG/ML (ref 2.8–26)
PROT SERPL-MCNC: 7.5 G/DL (ref 6–8.2)
RBC # BLD AUTO: 4.25 M/UL (ref 4.2–5.4)
SODIUM SERPL-SCNC: 137 MMOL/L (ref 135–145)
TROPONIN T SERPL-MCNC: <6 NG/L (ref 6–19)
WBC # BLD AUTO: 5.9 K/UL (ref 4.8–10.8)

## 2020-10-20 PROCEDURE — 84146 ASSAY OF PROLACTIN: CPT

## 2020-10-20 PROCEDURE — 93005 ELECTROCARDIOGRAM TRACING: CPT | Performed by: EMERGENCY MEDICINE

## 2020-10-20 PROCEDURE — 99284 EMERGENCY DEPT VISIT MOD MDM: CPT

## 2020-10-20 PROCEDURE — 84484 ASSAY OF TROPONIN QUANT: CPT

## 2020-10-20 PROCEDURE — 85025 COMPLETE CBC W/AUTO DIFF WBC: CPT

## 2020-10-20 PROCEDURE — 80053 COMPREHEN METABOLIC PANEL: CPT

## 2020-10-20 PROCEDURE — 83605 ASSAY OF LACTIC ACID: CPT

## 2020-10-20 PROCEDURE — 36415 COLL VENOUS BLD VENIPUNCTURE: CPT

## 2020-10-20 RX ORDER — LORAZEPAM 2 MG/ML
INJECTION INTRAMUSCULAR
Status: DISCONTINUED
Start: 2020-10-20 | End: 2020-10-20 | Stop reason: HOSPADM

## 2020-10-20 RX ORDER — ALPRAZOLAM 1 MG/1
1 TABLET ORAL 2 TIMES DAILY PRN
Status: SHIPPED | COMMUNITY
End: 2021-11-01

## 2020-10-20 ASSESSMENT — LIFESTYLE VARIABLES: DO YOU DRINK ALCOHOL: NO

## 2020-10-20 ASSESSMENT — FIBROSIS 4 INDEX: FIB4 SCORE: 0.58

## 2020-10-20 NOTE — ED NOTES
The patient has been provided with education and information.  The patient was also provided with instructions on follow up care and return precautions.  The patient verbalizes understanding of instructions, follow up care, and return precautions.  All questions have been answered.   NAD, A/Ox4, good color and appropriate at time of leaving.  Patient ambulated steady gait out of department.      Sadia Troncoso patient has chosen to leave the hospital against medical advice. The attending ERP has not discharged the patient. Patient is not a risk to himself or others.  Discharge against medical advice form has been signed

## 2020-10-20 NOTE — ED NOTES
Pt shaking vigorously in the bed. Family called RN to bedside for concerns of third seizure since arriving to [facility]. Pt maintaining airway. ERP aware.

## 2020-10-20 NOTE — DISCHARGE INSTRUCTIONS
I discussed your  case with your neurologist partner, they should be expecting a call later this morning and hopefully can see you later today.  If you have any other concerns you are always welcome to return to the emergency department anytime.

## 2020-10-20 NOTE — ED TRIAGE NOTES
"Sadia Troncoso  24 y.o. female  Chief Complaint   Patient presents with   • Seizure     Per EMS mother witnessed 10 seizures this evening. EMS reports witnessing 3 additional seizures in route. EMS adminsitered 5mg Versed IM, 5mg Versed IV     Pt BIB EMS for above complaint.     Upon arrival to [facility], ERP and RN bedside. Pt appeared to have another seizure lasting ~30 seconds. Pt without incontinence or oral trauma. Immediately following completion of seizure, pt A/Ox4, GCS 15, does not appear post ictal.     Suction set up, rails padded.     Pt denies travel outside [facility] in the past 14 days, and denies respiratory symptoms.     Hx: Seizures    Blood Pressure: (!) 95/52, NIBP MAP (Calculated): 65, Pulse: 96, Respiration: 19, Temperature: 37.1 °C (98.7 °F), Height: 154.9 cm (5' 1\"), Weight: 68 kg (150 lb), BMI (Calculated): 28.34, BSA (Calculated): 1.7, Pulse Oximetry: 99 %, O2 (LPM): 2, O2 Delivery Device: Nasal Cannula      "

## 2020-10-20 NOTE — ED PROVIDER NOTES
ED Provider Note    CHIEF COMPLAINT  Chief Complaint   Patient presents with   • Seizure     Per EMS mother witnessed 10 seizures this evening. EMS reports witnessing 3 additional seizures in route. EMS adminsitered 5mg Versed IM, 5mg Versed IV       HPI  Sadia Troncoso is a 24 y.o. female with history of anxiety, depression psychogenic nonepileptic seizures here with multiple seizures today.  Patient was found by mother having multiple seizures, she called 911, upon EMS arrival patient was having a questionable seizure, they gave a total of 10 of Versed and transported to the emergency department.  Upon patient's arrival to the emergency department she is alert and oriented and reported that she cannot remember exactly what happened today but reports it felt like she was having more seizures.  Patient denies any associated severe headache or neck pain.  She denies any fevers or constitutional symptoms.  Patient denies any nausea or vomiting.    Ancillary history obtained from patient's mother, she reports that patient was having multiple seizures, she thought that she was not breathing and that she was dead weight and call 911 and started doing chest compressions.  Upon EMS arrival patient had pulses and she was given Versed as described above.    REVIEW OF SYSTEMS  ROS    See HPI for further details. All other systems are negative.     PAST MEDICAL HISTORY   has a past medical history of Anxiety, Depression, and Seizure disorder (HCC).    SOCIAL HISTORY  Social History     Tobacco Use   • Smoking status: Current Every Day Smoker     Packs/day: 0.25     Years: 2.00     Pack years: 0.50     Types: Cigarettes   • Smokeless tobacco: Never Used   Substance and Sexual Activity   • Alcohol use: Not Currently     Alcohol/week: 1.8 oz     Types: 3 Cans of beer per week     Frequency: 2-3 times a week     Drinks per session: 3 or 4     Binge frequency: Less than monthly     Comment: 3 alcoholic seltzers 1 day a week   • Drug  use: No   • Sexual activity: Not on file       SURGICAL HISTORY  patient denies any surgical history    CURRENT MEDICATIONS  Home Medications     Reviewed by Obdulio Bajwa R.N. (Registered Nurse) on 10/20/20 at 0151  Med List Status: Partial   Medication Last Dose Status   acetaminophen/caffeine/butalbital 325-40-50 mg (FIORICET) -40 MG Tab  Active   ALPRAZolam (XANAX) 1 MG Tab  Active   clonazepam (KLONOPIN) 2 MG tablet  Active   ferrous sulfate 325 (65 Fe) MG EC tablet  Active   ipratropium-albuterol (DUONEB) 0.5-2.5 (3) MG/3ML nebulizer solution  Active   modafinil (PROVIGIL) 100 MG Tab  Active   sertraline (ZOLOFT) 50 MG Tab  Active                ALLERGIES  No Known Allergies    PHYSICAL EXAM  Physical Exam   Constitutional: She is oriented to person, place, and time. She appears well-developed and well-nourished.   HENT:   Head: Normocephalic and atraumatic.   Eyes: Conjunctivae are normal.   Neck: Normal range of motion. Neck supple.   Cardiovascular: Normal rate and regular rhythm.   Pulmonary/Chest: Effort normal and breath sounds normal.   Abdominal: Soft. Bowel sounds are normal. She exhibits no distension. There is no abdominal tenderness. There is no rebound.   Neurological: She is alert and oriented to person, place, and time.   Distal and proximal strength 5/5 in upper and lower extremities.  No dysmetria. No sensation deficits. No visual field deficits. Cranial nerves intact.      Skin: Skin is warm and dry. No rash noted.   Psychiatric: She has a normal mood and affect. Her behavior is normal.         DIAGNOSTIC STUDIES / PROCEDURES    EKG  See below    LABS  Results for orders placed or performed during the hospital encounter of 10/20/20   CBC WITH DIFFERENTIAL   Result Value Ref Range    WBC 5.9 4.8 - 10.8 K/uL    RBC 4.25 4.20 - 5.40 M/uL    Hemoglobin 11.5 (L) 12.0 - 16.0 g/dL    Hematocrit 35.7 (L) 37.0 - 47.0 %    MCV 84.0 81.4 - 97.8 fL    MCH 27.1 27.0 - 33.0 pg    MCHC 32.2 (L)  33.6 - 35.0 g/dL    RDW 45.6 35.9 - 50.0 fL    Platelet Count 348 164 - 446 K/uL    MPV 11.1 9.0 - 12.9 fL    Neutrophils-Polys 50.10 44.00 - 72.00 %    Lymphocytes 39.80 22.00 - 41.00 %    Monocytes 8.40 0.00 - 13.40 %    Eosinophils 1.00 0.00 - 6.90 %    Basophils 0.50 0.00 - 1.80 %    Immature Granulocytes 0.20 0.00 - 0.90 %    Nucleated RBC 0.00 /100 WBC    Neutrophils (Absolute) 2.94 2.00 - 7.15 K/uL    Lymphs (Absolute) 2.33 1.00 - 4.80 K/uL    Monos (Absolute) 0.49 0.00 - 0.85 K/uL    Eos (Absolute) 0.06 0.00 - 0.51 K/uL    Baso (Absolute) 0.03 0.00 - 0.12 K/uL    Immature Granulocytes (abs) 0.01 0.00 - 0.11 K/uL    NRBC (Absolute) 0.00 K/uL   CMP   Result Value Ref Range    Sodium 137 135 - 145 mmol/L    Potassium 3.3 (L) 3.6 - 5.5 mmol/L    Chloride 103 96 - 112 mmol/L    Co2 20 20 - 33 mmol/L    Anion Gap 14.0 7.0 - 16.0    Glucose 99 65 - 99 mg/dL    Bun 9 8 - 22 mg/dL    Creatinine 0.70 0.50 - 1.40 mg/dL    Calcium 9.1 8.5 - 10.5 mg/dL    AST(SGOT) 18 12 - 45 U/L    ALT(SGPT) 15 2 - 50 U/L    Alkaline Phosphatase 63 30 - 99 U/L    Total Bilirubin 0.2 0.1 - 1.5 mg/dL    Albumin 4.4 3.2 - 4.9 g/dL    Total Protein 7.5 6.0 - 8.2 g/dL    Globulin 3.1 1.9 - 3.5 g/dL    A-G Ratio 1.4 g/dL   PROLACTIN   Result Value Ref Range    Prolactin 11.90 2.80 - 26.00 ng/mL   LACTIC ACID   Result Value Ref Range    Lactic Acid 1.4 0.5 - 2.0 mmol/L   ESTIMATED GFR   Result Value Ref Range    GFR If African American >60 >60 mL/min/1.73 m 2    GFR If Non African American >60 >60 mL/min/1.73 m 2   TROPONIN   Result Value Ref Range    Troponin T <6 6 - 19 ng/L   EKG   Result Value Ref Range    Report       [facility] Emergency Dept.    Test Date:  2020-10-20  Pt Name:    JONATHAN DICKERSON                 Department: ER  MRN:        [id number]                      Room:       [facility]  Gender:     Female                       Technician: [id number]  :        1995                   Requested By:NOE PACHECO  MARIA R  Order #:    [id number]                    Reading MD: Maria R Nayak MD    Measurements  Intervals                                Axis  Rate:       74                           P:          9  IN:         184                          QRS:        47  QRSD:       90                           T:          31  QT:         404  QTc:        449    Interpretive Statements  EKG is normal sinus rhythm, normal axis normal intervals no ST changes  consistent with acute regional ischemia  Electronically Signed On 10- 3:27:21 PDT by Maria R Nayak MD           COURSE & MEDICAL DECISION MAKING  Pertinent Labs & Imaging studies reviewed. (See chart for details)    Patient here with very thorough work-up at her last visit including an MRI, CTA head and neck and EEG.  MRI, CTA head and neck failed to reveal any evidence of acute abnormality; she did have a non-specific vascular loop in the right cerebellar artery .  Patient's EEG showed psychogenic nonepileptic seizures.  Upon patient arrival she she is alert and oriented x4.  There is no evidence of any neurologic deficit or postictal state.  Patient had a convulsion shortly after arrival, given my suspicion of possible pseudoseizure I use some ammonia, this broke the convulsion almost immediately.  Patient was never post ictal  After this patient had another episode with the nurse who with some verbal coaching stopped the seizure  Patient reports that she has been told she has a brain mass but MRI done as recently as September failed to reveal any mass.  Patient case discussed with patient's neurologist on call Dr. Hayes? For Dr. Rodriguez.  He reports patient symptoms do appear consistent with pseudoseizure and does not recommend any further changes in her medications at this point.  He will discuss the case with his partner and ensure patient has close follow-up.    I have elected to check basic labs to ensure the patient does not have any considerable electrolyte  abnormality or laboratory results suggestive of multiple seizures such as an anion gap acidosis or lactic acidosis or prolactin elevation.  I checked an EKG to ensure that this was not a syncopal event or cardiac event I believe is highly unlikely and I have also checked a troponin.  We will check an x-ray although patient does not have any chest tenderness given the chest compressions I want to ensure that there is no rib fractures.    Patient's basic metabolic panel is reassuring, there is no anion gap acidosis making a prolonged seizure very unlikely.  Patient's EKG is also entirely normal.    Prior to all laboratory results returning patient is requesting to leave AGAINST MEDICAL ADVICE.  I discussed with her that I like to observe her further given the questionable nature of what occurred early this morning and I discussed that leaving AGAINST MEDICAL ADVICE could result in a misdiagnosis or incomplete work-up and resulted in death or severe disability.  I had this conversation in the presence of patient's mother.  Patient understands the risks and can repeat this to me.  She nevertheless would like to leave AGAINST MEDICAL ADVICE.  She has signed the paperwork.     The patient will return for worsening symptoms and is stable at the time of discharge. The patient verbalizes understanding and will comply.    FINAL IMPRESSION    1. Seizure-like activity (HCC)               Electronically signed by: Nael Heck M.D., 10/20/2020 1:53 AM

## 2021-04-18 ENCOUNTER — NURSE TRIAGE (OUTPATIENT)
Dept: HEALTH INFORMATION MANAGEMENT | Facility: OTHER | Age: 26
End: 2021-04-18

## 2021-04-18 ENCOUNTER — HOSPITAL ENCOUNTER (EMERGENCY)
Facility: MEDICAL CENTER | Age: 26
End: 2021-04-18
Attending: EMERGENCY MEDICINE
Payer: COMMERCIAL

## 2021-04-18 VITALS
OXYGEN SATURATION: 96 % | HEART RATE: 87 BPM | RESPIRATION RATE: 14 BRPM | WEIGHT: 154.1 LBS | SYSTOLIC BLOOD PRESSURE: 101 MMHG | TEMPERATURE: 98 F | HEIGHT: 61 IN | DIASTOLIC BLOOD PRESSURE: 67 MMHG | BODY MASS INDEX: 29.09 KG/M2

## 2021-04-18 DIAGNOSIS — N76.0 BACTERIAL VAGINITIS: ICD-10-CM

## 2021-04-18 DIAGNOSIS — T19.2XXA FOREIGN BODY IN VAGINA, INITIAL ENCOUNTER: ICD-10-CM

## 2021-04-18 DIAGNOSIS — B96.89 BACTERIAL VAGINITIS: ICD-10-CM

## 2021-04-18 LAB
BACTERIA GENITAL QL WET PREP: NORMAL
BASOPHILS # BLD AUTO: 0.7 % (ref 0–1.8)
BASOPHILS # BLD: 0.04 K/UL (ref 0–0.12)
EOSINOPHIL # BLD AUTO: 0.07 K/UL (ref 0–0.51)
EOSINOPHIL NFR BLD: 1.3 % (ref 0–6.9)
ERYTHROCYTE [DISTWIDTH] IN BLOOD BY AUTOMATED COUNT: 46.7 FL (ref 35.9–50)
HCG SERPL QL: NEGATIVE
HCT VFR BLD AUTO: 36.7 % (ref 37–47)
HGB BLD-MCNC: 11.5 G/DL (ref 12–16)
IMM GRANULOCYTES # BLD AUTO: 0.01 K/UL (ref 0–0.11)
IMM GRANULOCYTES NFR BLD AUTO: 0.2 % (ref 0–0.9)
LYMPHOCYTES # BLD AUTO: 2.21 K/UL (ref 1–4.8)
LYMPHOCYTES NFR BLD: 40.9 % (ref 22–41)
MCH RBC QN AUTO: 26.3 PG (ref 27–33)
MCHC RBC AUTO-ENTMCNC: 31.3 G/DL (ref 33.6–35)
MCV RBC AUTO: 84 FL (ref 81.4–97.8)
MONOCYTES # BLD AUTO: 0.38 K/UL (ref 0–0.85)
MONOCYTES NFR BLD AUTO: 7 % (ref 0–13.4)
NEUTROPHILS # BLD AUTO: 2.7 K/UL (ref 2–7.15)
NEUTROPHILS NFR BLD: 49.9 % (ref 44–72)
NRBC # BLD AUTO: 0 K/UL
NRBC BLD-RTO: 0 /100 WBC
PLATELET # BLD AUTO: 362 K/UL (ref 164–446)
PMV BLD AUTO: 11.2 FL (ref 9–12.9)
RBC # BLD AUTO: 4.37 M/UL (ref 4.2–5.4)
SIGNIFICANT IND 70042: NORMAL
SITE SITE: NORMAL
SOURCE SOURCE: NORMAL
WBC # BLD AUTO: 5.4 K/UL (ref 4.8–10.8)

## 2021-04-18 PROCEDURE — 700102 HCHG RX REV CODE 250 W/ 637 OVERRIDE(OP): Performed by: EMERGENCY MEDICINE

## 2021-04-18 PROCEDURE — 99284 EMERGENCY DEPT VISIT MOD MDM: CPT

## 2021-04-18 PROCEDURE — A9270 NON-COVERED ITEM OR SERVICE: HCPCS | Performed by: EMERGENCY MEDICINE

## 2021-04-18 PROCEDURE — 87491 CHLMYD TRACH DNA AMP PROBE: CPT

## 2021-04-18 PROCEDURE — 84703 CHORIONIC GONADOTROPIN ASSAY: CPT

## 2021-04-18 PROCEDURE — 87591 N.GONORRHOEAE DNA AMP PROB: CPT

## 2021-04-18 PROCEDURE — 85025 COMPLETE CBC W/AUTO DIFF WBC: CPT

## 2021-04-18 RX ORDER — HYDROCODONE BITARTRATE AND ACETAMINOPHEN 5; 325 MG/1; MG/1
1 TABLET ORAL ONCE
Status: DISCONTINUED | OUTPATIENT
Start: 2021-04-18 | End: 2021-04-18

## 2021-04-18 RX ORDER — METRONIDAZOLE 500 MG/1
500 TABLET ORAL
Qty: 14 TABLET | Refills: 1 | Status: SHIPPED | OUTPATIENT
Start: 2021-04-18 | End: 2021-04-25

## 2021-04-18 RX ORDER — HYDROCODONE BITARTRATE AND ACETAMINOPHEN 5; 325 MG/1; MG/1
1 TABLET ORAL ONCE
Status: COMPLETED | OUTPATIENT
Start: 2021-04-18 | End: 2021-04-18

## 2021-04-18 RX ORDER — METRONIDAZOLE 500 MG/1
500 TABLET ORAL
Qty: 14 TABLET | Refills: 1 | Status: SHIPPED | OUTPATIENT
Start: 2021-04-18 | End: 2021-04-18 | Stop reason: SDUPTHER

## 2021-04-18 RX ORDER — ACETAMINOPHEN 325 MG/1
650 TABLET ORAL ONCE
Status: COMPLETED | OUTPATIENT
Start: 2021-04-18 | End: 2021-04-18

## 2021-04-18 RX ADMIN — ACETAMINOPHEN 650 MG: 325 TABLET, FILM COATED ORAL at 18:50

## 2021-04-18 RX ADMIN — HYDROCODONE BITARTRATE AND ACETAMINOPHEN 1 TABLET: 5; 325 TABLET ORAL at 19:15

## 2021-04-18 ASSESSMENT — FIBROSIS 4 INDEX: FIB4 SCORE: 0.36

## 2021-04-18 ASSESSMENT — ENCOUNTER SYMPTOMS
COUGH: 0
FEVER: 0
CHILLS: 1
VOMITING: 0
DIARRHEA: 0

## 2021-04-18 NOTE — TELEPHONE ENCOUNTER
Foul odor since November, now discharge,   Spotting and pain. She has not been able to establish w/ a PCP. States negative pregnancy test.  Patient asking if ED can handle her condition. Advised patient that since she is not established and foul odor present since November she should be examined in the ED.     Reason for Disposition  • Bad smelling vaginal discharge    Protocols used: VAGINAL [id number]

## 2021-04-19 LAB
C TRACH DNA SPEC QL NAA+PROBE: NEGATIVE
N GONORRHOEA DNA SPEC QL NAA+PROBE: NEGATIVE
SPECIMEN SOURCE: NORMAL

## 2021-04-19 NOTE — ED PROVIDER NOTES
ED Provider Note    Scribed for Adarsh Corona M.D. by Elizabeth Lin. 4/18/2021, 6:00 PM.    Primary care provider: Kishan Alanis M.D.  Means of arrival: Walk-in  History obtained from: Patient  History limited by: None    CHIEF COMPLAINT  Chief Complaint   Patient presents with   • Vaginal Discharge     Pt reports abnormal malodorus discharge since November       HPI  Sadia Troncoso is a 25 y.o. female who presents to the Emergency Department for evaluation of abnormal vaginal discharge onset several months prior to arrival. It first started in November, but has since been worsening. She reports that it is malodorous. She is now spotting and having lower abdominal cramping. She has had chills and dysuria, but no fever, vomiting, cough, or diarrhea. She is unsure if she is pregnant. Tylenol has not alleviated her pain.     REVIEW OF SYSTEMS  Review of Systems   Constitutional: Positive for chills. Negative for fever.   Respiratory: Negative for cough.    Gastrointestinal: Negative for diarrhea and vomiting.   Genitourinary: Positive for dysuria.        Positive for abnormal vaginal discharge, spotting, and cramping   All other systems reviewed and are negative.      PAST MEDICAL HISTORY   has a past medical history of Anxiety, Depression, and Seizure disorder (HCC).    SURGICAL HISTORY  patient denies any surgical history    SOCIAL HISTORY  Social History     Tobacco Use   • Smoking status: Current Every Day Smoker     Packs/day: 0.25     Years: 2.00     Pack years: 0.50     Types: Cigarettes   • Smokeless tobacco: Never Used   Substance Use Topics   • Alcohol use: Not Currently     Alcohol/week: 1.8 oz     Types: 3 Cans of beer per week     Comment: 3 alcoholic seltzers 1 day a week   • Drug use: Yes     Types: Inhaled     Comment: THC CBD      Social History     Substance and Sexual Activity   Drug Use Yes   • Types: Inhaled    Comment: THC CBD       FAMILY HISTORY  Family History   Problem Relation Age of  "Onset   • Seizures Maternal Grandmother    • Seizures Maternal Grandfather    • Seizures Paternal Grandmother    • Seizures Paternal Grandfather        CURRENT MEDICATIONS  Home Medications     Reviewed by Jasper Ascencio R.N. (Registered Nurse) on 04/18/21 at 1712  Med List Status: Not Addressed   Medication Last Dose Status   ALPRAZolam (XANAX) 1 MG Tab  Active   clonazepam (KLONOPIN) 2 MG tablet  Active   ferrous sulfate 325 (65 Fe) MG EC tablet  Active   ipratropium-albuterol (DUONEB) 0.5-2.5 (3) MG/3ML nebulizer solution  Active   sertraline (ZOLOFT) 50 MG Tab  Active                ALLERGIES  No Known Allergies    PHYSICAL EXAM  VITAL SIGNS: /74   Pulse (!) 102   Temp 36.7 °C (98 °F) (Temporal)   Resp 14   Ht 1.549 m (5' 1\")   Wt 69.9 kg (154 lb 1.6 oz)   LMP 04/14/2021 (Approximate)   SpO2 97%   BMI 29.12 kg/m²     Constitutional:  No acute distress  HENT:  Moist mucous membranes  Eyes:  No conjunctivitis or icterus  Neck:  trachea is midline, no palpable thyroid  Lymphatic:  No cervical lymphadenopathy  Cardiovascular:  Regular rate and rhythm, no murmurs  Thorax & Lungs:  Normal breath sounds, no rhonchi  Abdomen:  Soft, Non-tender  Skin:.  no rash  Back:  Non-tender, no CVA tenderness  Extremities:   no edema  Vascular:  symmetric radial pulse  Neurologic:  Normal gross motor  Pelvic exam: The patient had normal discharge and had a Tampax in the posterior cul-de-sac that was removed foul-smelling.    LABS  Labs Reviewed   CBC WITH DIFFERENTIAL - Abnormal; Notable for the following components:       Result Value    Hemoglobin 11.5 (*)     Hematocrit 36.7 (*)     MCH 26.3 (*)     MCHC 31.3 (*)     All other components within normal limits   HCG QUAL SERUM   WET PREP   CHLAMYDIA/GC PCR URINE OR SWAB     All labs reviewed by me.      COURSE & MEDICAL DECISION MAKING  Pertinent Labs & Imaging studies reviewed. (See chart for details)    6:00 PM - Patient seen and examined at bedside. Ordered wet " prep, chlamydia and GC, CBC with differential, and HCG qual serum to evaluate her symptoms.     6:49 PM - Patient treated with Tylenol.    7:11 PM - Patient treated with Norco.    8:16 PM - Patient was reevaluated at bedside. Discussed lab results with the patient and informed them that she has bacterial vaginitis and will be treated with antibiotics. Discussed discharge instructions and return precautions with the patient and they were cleared for discharge. Patient was given the opportunity to ask any further questions. She is comfortable with discharge at this time.      Medical Decision Making:   Patient's lab work did show bacterial vaginosis so she is placed on metronidazole.  STD tests are pending however I think her primary problem is retained foreign body that is been removed I anticipate improvement she is given return precautions and follow-up      The patient will return for new or worsening symptoms and is stable at the time of discharge.    The patient is referred to a primary physician for blood pressure management, diabetic screening, and for all other preventative health concerns.    DISPOSITION:  Patient will be discharged home in stable condition.    FOLLOW UP:  Kishan Alanis M.D.  [address] [place] [id number]  [phone]            OUTPATIENT MEDICATIONS:  New Prescriptions    METRONIDAZOLE (FLAGYL) 500 MG TAB    Take 1 tablet by mouth 2 times a day for 7 days.       FINAL IMPRESSION  1. Foreign body in vagina, initial encounter    2. Bacterial vaginitis          Elizabeth GONCALVES (Maribeth), am scribing for, and in the presence of, Adarsh Corona M.D..    Electronically signed by: Elizabeth Lin (Carinibe), 4/18/2021    Adarsh GONCALVES M.D. personally performed the services described in this documentation, as scribed by Elizabeth Lin in my presence, and it is both accurate and complete.    The note accurately reflects work and decisions made by me.  Adarsh Corona M.D.   4/18/2021  10:16 PM

## 2021-04-19 NOTE — ED NOTES
Medicated with Tylenol.  Pt has a lot of lower abvd cramping since the pelvic and removal of tampon.

## 2021-04-19 NOTE — ED TRIAGE NOTES
Chief Complaint   Patient presents with   • Vaginal Discharge     Pt reports abnormal malodorus discharge since November     Pt ambulatory to triage with steady gait for above complaint. LMP  pt reports after ending period she continued to have spotting. Pt seen by OB and tested for STIs at beginning of November. Pt has history of abnormal cells on PAP smear. Unknown pregnancy status.

## 2021-04-19 NOTE — ED NOTES
I went in to discharge the pt and she had already left her room.  I tried to contact her by cell phone but her message box is full and I was unable to leave her a message.

## 2021-04-22 ENCOUNTER — APPOINTMENT (OUTPATIENT)
Dept: RADIOLOGY | Facility: MEDICAL CENTER | Age: 26
End: 2021-04-22
Attending: EMERGENCY MEDICINE
Payer: COMMERCIAL

## 2021-04-22 ENCOUNTER — HOSPITAL ENCOUNTER (EMERGENCY)
Facility: MEDICAL CENTER | Age: 26
End: 2021-04-22
Attending: EMERGENCY MEDICINE
Payer: COMMERCIAL

## 2021-04-22 VITALS
DIASTOLIC BLOOD PRESSURE: 58 MMHG | TEMPERATURE: 97 F | RESPIRATION RATE: 18 BRPM | SYSTOLIC BLOOD PRESSURE: 93 MMHG | OXYGEN SATURATION: 95 % | BODY MASS INDEX: 28.97 KG/M2 | WEIGHT: 153.44 LBS | HEIGHT: 61 IN | HEART RATE: 80 BPM

## 2021-04-22 DIAGNOSIS — R10.2 PELVIC PAIN: ICD-10-CM

## 2021-04-22 DIAGNOSIS — N76.0 BACTERIAL VAGINOSIS: ICD-10-CM

## 2021-04-22 DIAGNOSIS — B96.89 BACTERIAL VAGINOSIS: ICD-10-CM

## 2021-04-22 LAB
ALBUMIN SERPL BCP-MCNC: 4.5 G/DL (ref 3.2–4.9)
ALBUMIN/GLOB SERPL: 1.1 G/DL
ALP SERPL-CCNC: 78 U/L (ref 30–99)
ALT SERPL-CCNC: 17 U/L (ref 2–50)
ANION GAP SERPL CALC-SCNC: 11 MMOL/L (ref 7–16)
APPEARANCE UR: CLEAR
AST SERPL-CCNC: 21 U/L (ref 12–45)
BASOPHILS # BLD AUTO: 0.8 % (ref 0–1.8)
BASOPHILS # BLD: 0.04 K/UL (ref 0–0.12)
BILIRUB SERPL-MCNC: 0.2 MG/DL (ref 0.1–1.5)
BILIRUB UR QL STRIP.AUTO: NEGATIVE
BUN SERPL-MCNC: 10 MG/DL (ref 8–22)
CALCIUM SERPL-MCNC: 9.5 MG/DL (ref 8.5–10.5)
CANDIDA DNA VAG QL PROBE+SIG AMP: NEGATIVE
CHLORIDE SERPL-SCNC: 103 MMOL/L (ref 96–112)
CO2 SERPL-SCNC: 23 MMOL/L (ref 20–33)
COLOR UR: YELLOW
CREAT SERPL-MCNC: 0.88 MG/DL (ref 0.5–1.4)
EOSINOPHIL # BLD AUTO: 0.05 K/UL (ref 0–0.51)
EOSINOPHIL NFR BLD: 1 % (ref 0–6.9)
ERYTHROCYTE [DISTWIDTH] IN BLOOD BY AUTOMATED COUNT: 48.2 FL (ref 35.9–50)
G VAGINALIS DNA VAG QL PROBE+SIG AMP: POSITIVE
GLOBULIN SER CALC-MCNC: 4 G/DL (ref 1.9–3.5)
GLUCOSE SERPL-MCNC: 79 MG/DL (ref 65–99)
GLUCOSE UR STRIP.AUTO-MCNC: NEGATIVE MG/DL
HCT VFR BLD AUTO: 36.8 % (ref 37–47)
HGB BLD-MCNC: 11.5 G/DL (ref 12–16)
IMM GRANULOCYTES # BLD AUTO: 0.07 K/UL (ref 0–0.11)
IMM GRANULOCYTES NFR BLD AUTO: 1.4 % (ref 0–0.9)
KETONES UR STRIP.AUTO-MCNC: NEGATIVE MG/DL
LEUKOCYTE ESTERASE UR QL STRIP.AUTO: NEGATIVE
LYMPHOCYTES # BLD AUTO: 1.84 K/UL (ref 1–4.8)
LYMPHOCYTES NFR BLD: 37.3 % (ref 22–41)
MCH RBC QN AUTO: 26.6 PG (ref 27–33)
MCHC RBC AUTO-ENTMCNC: 31.3 G/DL (ref 33.6–35)
MCV RBC AUTO: 85 FL (ref 81.4–97.8)
MICRO URNS: NORMAL
MONOCYTES # BLD AUTO: 0.28 K/UL (ref 0–0.85)
MONOCYTES NFR BLD AUTO: 5.7 % (ref 0–13.4)
NEUTROPHILS # BLD AUTO: 2.65 K/UL (ref 2–7.15)
NEUTROPHILS NFR BLD: 53.8 % (ref 44–72)
NITRITE UR QL STRIP.AUTO: NEGATIVE
NRBC # BLD AUTO: 0 K/UL
NRBC BLD-RTO: 0 /100 WBC
PH UR STRIP.AUTO: 6.5 [PH] (ref 5–8)
PLATELET # BLD AUTO: 331 K/UL (ref 164–446)
PMV BLD AUTO: 10.7 FL (ref 9–12.9)
POTASSIUM SERPL-SCNC: 4.2 MMOL/L (ref 3.6–5.5)
PROT SERPL-MCNC: 8.5 G/DL (ref 6–8.2)
PROT UR QL STRIP: NEGATIVE MG/DL
RBC # BLD AUTO: 4.33 M/UL (ref 4.2–5.4)
RBC UR QL AUTO: NEGATIVE
SODIUM SERPL-SCNC: 137 MMOL/L (ref 135–145)
SP GR UR STRIP.AUTO: 1.01
T VAGINALIS DNA VAG QL PROBE+SIG AMP: NEGATIVE
UROBILINOGEN UR STRIP.AUTO-MCNC: 0.2 MG/DL
WBC # BLD AUTO: 4.9 K/UL (ref 4.8–10.8)

## 2021-04-22 PROCEDURE — 85025 COMPLETE CBC W/AUTO DIFF WBC: CPT

## 2021-04-22 PROCEDURE — 99284 EMERGENCY DEPT VISIT MOD MDM: CPT

## 2021-04-22 PROCEDURE — 87660 TRICHOMONAS VAGIN DIR PROBE: CPT

## 2021-04-22 PROCEDURE — 81003 URINALYSIS AUTO W/O SCOPE: CPT

## 2021-04-22 PROCEDURE — 87510 GARDNER VAG DNA DIR PROBE: CPT

## 2021-04-22 PROCEDURE — 87480 CANDIDA DNA DIR PROBE: CPT

## 2021-04-22 PROCEDURE — 96372 THER/PROPH/DIAG INJ SC/IM: CPT

## 2021-04-22 PROCEDURE — 700102 HCHG RX REV CODE 250 W/ 637 OVERRIDE(OP): Performed by: EMERGENCY MEDICINE

## 2021-04-22 PROCEDURE — 80053 COMPREHEN METABOLIC PANEL: CPT

## 2021-04-22 PROCEDURE — 700111 HCHG RX REV CODE 636 W/ 250 OVERRIDE (IP): Performed by: EMERGENCY MEDICINE

## 2021-04-22 PROCEDURE — A9270 NON-COVERED ITEM OR SERVICE: HCPCS | Performed by: EMERGENCY MEDICINE

## 2021-04-22 PROCEDURE — 76856 US EXAM PELVIC COMPLETE: CPT

## 2021-04-22 RX ORDER — HYDROCODONE BITARTRATE AND ACETAMINOPHEN 5; 325 MG/1; MG/1
1 TABLET ORAL EVERY 6 HOURS PRN
Qty: 5 TABLET | Refills: 0 | Status: SHIPPED | OUTPATIENT
Start: 2021-04-22 | End: 2021-04-24

## 2021-04-22 RX ORDER — HYDROCODONE BITARTRATE AND ACETAMINOPHEN 10; 325 MG/1; MG/1
1 TABLET ORAL ONCE
Status: COMPLETED | OUTPATIENT
Start: 2021-04-22 | End: 2021-04-22

## 2021-04-22 RX ORDER — METRONIDAZOLE 500 MG/1
500 TABLET ORAL 2 TIMES DAILY
Qty: 6 TABLET | Refills: 0 | Status: SHIPPED | OUTPATIENT
Start: 2021-04-22 | End: 2021-04-25

## 2021-04-22 RX ORDER — KETOROLAC TROMETHAMINE 30 MG/ML
15 INJECTION, SOLUTION INTRAMUSCULAR; INTRAVENOUS ONCE
Status: COMPLETED | OUTPATIENT
Start: 2021-04-22 | End: 2021-04-22

## 2021-04-22 RX ADMIN — KETOROLAC TROMETHAMINE 15 MG: 30 INJECTION, SOLUTION INTRAMUSCULAR at 17:36

## 2021-04-22 RX ADMIN — HYDROCODONE BITARTRATE AND ACETAMINOPHEN 1 TABLET: 10; 325 TABLET ORAL at 19:47

## 2021-04-22 RX ADMIN — HYDROCODONE BITARTRATE AND ACETAMINOPHEN 1 TABLET: 10; 325 TABLET ORAL at 17:36

## 2021-04-22 ASSESSMENT — PAIN DESCRIPTION - PAIN TYPE
TYPE: ACUTE PAIN
TYPE: ACUTE PAIN

## 2021-04-22 ASSESSMENT — FIBROSIS 4 INDEX: FIB4 SCORE: 0.35

## 2021-04-22 NOTE — ED TRIAGE NOTES
"..  Chief Complaint   Patient presents with   • Pelvic Pain     c/o of extreme pelvic pain after having a tampon removed from her vagina after months. pt is tearful and reports the symptoms are worsening even after abx. pt reports lightheadedness          /63   Pulse 78   Temp 36.2 °C (97.2 °F) (Oral)   Resp 18   Ht 1.549 m (5' 1\")   Wt 69.6 kg (153 lb 7 oz)   SpO2 93%        ..Explained triage process, to waiting room. Asked to inform RN if questions or concerns arise.   "

## 2021-04-22 NOTE — ED PROVIDER NOTES
ED Provider Note    CHIEF COMPLAINT  Chief Complaint   Patient presents with   • Pelvic Pain     c/o of extreme pelvic pain after having a tampon removed from her vagina after months. pt is tearful and reports the symptoms are worsening even after abx. pt reports lightheadedness        HPI  Sadia Troncoso is a 25 y.o. female who presents to the emergency department chief complaint of continued pelvic pain.  The patient recently had a tampon removed 4 days ago which she believes may been there since November.  Patient states she is trying to get pregnant she is worried that this pain is not going allow her to do that she still having discharge despite the Flagyl at home and ibuprofen Tylenol are not improving her discomfort.  She states she was feeling lightheaded and was breathing so fast in the waiting room she had a syncopal event.  She denies any chest pain or shortness of breath prior to this event.  She does have a history of anxiety and is feeling extremely anxious at this time    REVIEW OF SYSTEMS  Positives as above. Pertinent negatives include nausea vomiting fevers chills cough congestion diarrhea constipation dysuria hematuria flank pain  All other review of systems are negative    PAST MEDICAL HISTORY   has a past medical history of Anxiety, Depression, and Seizure disorder (HCC).    SOCIAL HISTORY  Social History     Tobacco Use   • Smoking status: Current Every Day Smoker     Packs/day: 0.25     Years: 2.00     Pack years: 0.50     Types: Cigarettes   • Smokeless tobacco: Never Used   Substance and Sexual Activity   • Alcohol use: Not Currently     Alcohol/week: 1.8 oz     Types: 3 Cans of beer per week     Comment: 3 alcoholic seltzers 1 day a week   • Drug use: Yes     Types: Inhaled     Comment: THC CBD   • Sexual activity: Not on file       SURGICAL HISTORY  patient denies any surgical history    CURRENT MEDICATIONS  Home Medications     Reviewed by Mihaela Nguyen R.N. (Registered Nurse) on 04/22/21  "at 1549  Med List Status: Not Addressed   Medication Last Dose Status   ALPRAZolam (XANAX) 1 MG Tab  Active   clonazepam (KLONOPIN) 2 MG tablet  Active   ferrous sulfate 325 (65 Fe) MG EC tablet  Active   ipratropium-albuterol (DUONEB) 0.5-2.5 (3) MG/3ML nebulizer solution  Active   metroNIDAZOLE (FLAGYL) 500 MG Tab  Active   sertraline (ZOLOFT) 50 MG Tab  Active                ALLERGIES  No Known Allergies    PHYSICAL EXAM  VITAL SIGNS: /63   Pulse 78   Temp 36.2 °C (97.2 °F) (Oral)   Resp 18   Ht 1.549 m (5' 1\")   Wt 69.6 kg (153 lb 7 oz)   LMP 04/14/2021 (Approximate)   SpO2 93%   BMI 28.99 kg/m²    Pulse ox interpretation: I interpret this pulse ox as normal.  Constitutional: Alert in mild distress and tearful  HENT: Normocephalic atraumatic, MMM  Eyes: PER, Conjunctiva normal, Non-icteric.   Neck: Normal range of motion, No tenderness, Supple, No stridor.   Cardiovascular: Regular rate and rhythm, no murmurs.   Thorax & Lungs: Normal breath sounds, No respiratory distress, No wheezing, No chest tenderness.   Abdomen: Bowel sounds normal, Soft, suprapubic and bilateral abdominal tenderness without rebound or guarding no pulsatile masses. No peritoneal signs.  Skin: Warm, Dry, No erythema, No rash.   Back: No bony tenderness, No CVA tenderness.   Extremities/MSK: Intact equal distal pulses, No edema, No tenderness, No cyanosis, no major deformities noted  Neurologic: Alert and oriented x3, No focal deficits noted.       DIFFERENTIAL DIAGNOSIS AND WORK UP PLAN    This is a 25 y.o. female who presents with recent diagnosis of bacterial vaginosis with a normal white blood cell count, she is continuing to have discomfort we will perform a pelvic ultrasound she states this pain is just abnormal for her, could be PID although she had negative chlamydia and gonorrhea as she does not have any signs or symptoms of throat indicate toxic shock her vital signs are stable she is not febrile and definitely not " septic.  She will be treated with some pain management repeat pelvic examination and pelvic ultrasound    DIAGNOSTIC STUDIES / PROCEDURES    EKG      LABS  Pertinent Lab Findings  CBC within normal limits she does have a mild anemia, CMP within normal limits urinalysis without signs of infection  Wet prep can consistent with Gardnerella      RADIOLOGY  US-PELVIC COMPLETE (TRANSABDOMINAL/TRANSVAGINAL) (COMBO)   Final Result      Normal transvaginal appearance of the pelvis.        The radiologist's interpretation of all radiological studies have been reviewed by me.      COURSE & MEDICAL DECISION MAKING  Pertinent Labs & Imaging studies reviewed. (See chart for details)    7:35 PM  Reassessed patient at the bedside performed her pelvic examination, I do see a thin white discharge no foul odor no cervical friability no large discharge from the cervix she does have mild cervical motion tenderness with bilateral pelvic discomfort although no ovarian tenderness    /9:55 PM  Patient required further pain management but urinalysis that signs of infection she does have bacterial vaginosis we will continue the treatment for up to 10 days and give her stronger pain meds for the next 1 to 2 days with some strict return precautions for severe worsening symptoms vomiting and fevers.  She states that she has insurance and she wants to follow-up with her primary OB/GYN    In prescribing controlled substances to this patient, I certify that I have obtained and reviewed the medical history of Sadia Troncoso. I have also made a good devin effort to obtain applicable records from other providers who have treated the patient and records did not demonstrate any increased risk of substance abuse that would prevent me from prescribing controlled substances.     I have conducted a physical exam and documented it. I have reviewed Ms. Troncoso’s prescription history as maintained by the [place] Prescription Monitoring Program.     I have assessed  "the patient’s risk for abuse, dependency, and addiction using the validated Opioid Risk Tool available at https://www.mdcalc.com/fdmffz-jgfo-dedz-ort-narcotic-abuse. 1    Given the above, I believe the benefits of controlled substance therapy outweigh the risks. The reasons for prescribing controlled substances include non-narcotic, oral analgesic alternatives have been inadequate for pain control. Accordingly, I have discussed the risk and benefits, treatment plan, and alternative therapies with the patient.     BP (!) 93/58   Pulse 80   Temp 36.1 °C (97 °F) (Temporal)   Resp 18   Ht 1.549 m (5' 1\")   Wt 69.6 kg (153 lb 7 oz)   LMP 04/14/2021 (Approximate)   SpO2 95%   BMI 28.99 kg/m²         I verified that the patient was wearing a mask and I was wearing appropriate PPE every time I entered the room. The patient's mask was on the patient at all times during my encounter except for a brief view of the oropharynx.      The patient will return for new or worsening symptoms and is stable at the time of discharge.    The patient is referred to a primary physician for blood pressure management, diabetic screening, and for all other preventative health concerns.    DISPOSITION:  Patient will be discharged home in stable condition.    FOLLOW UP:  Kishan Alanis M.D.  [address] [place] [id number]  [phone]    Schedule an appointment as soon as possible for a visit       [facility], Emergency Dept  [address] [facility] [address] [phone]    If symptoms worsen      OUTPATIENT MEDICATIONS:  Discharge Medication List as of 4/22/2021  9:59 PM      START taking these medications    Details   !! metroNIDAZOLE (FLAGYL) 500 MG Tab Take 1 tablet by mouth 2 times a day for 3 days., Disp-6 tablet, R-0, Normal      HYDROcodone-acetaminophen (NORCO) 5-325 MG Tab per tablet Take 1 tablet by mouth every 6 hours as needed for up to 2 days., Disp-5 tablet, R-0, Normal       !! - " Potential duplicate medications found. Please discuss with provider.            FINAL IMPRESSION  1. Pelvic pain  HYDROcodone-acetaminophen (NORCO) 5-325 MG Tab per tablet   2. Bacterial vaginosis             Electronically signed by: Margaret Damian M.D., 4/22/2021 4:48 PM    This dictation has been created using voice recognition software and/or scribes. The accuracy of the dictation is limited by the abilities of the software and the expertise of the scribes. I expect there may be some errors of grammar and possibly content. I made every attempt to manually correct the errors within my dictation. However, errors related to voice recognition software and/or scribes may still exist and should be interpreted within the appropriate context.

## 2021-04-22 NOTE — ED NOTES
"Charge RN notified that pt \"passed out\" in the lobby. Pt was assisted onto a gurney and brought back to a room very tearful.   Pt states she thinks she has toxic shock syndrome.   "

## 2021-04-23 NOTE — ED NOTES
Pt resting in gurney, respirations even and unlabored. NAD noted, updated on POC. Pt  Driss called, updated on pt, per pt request.

## 2021-04-23 NOTE — DISCHARGE INSTRUCTIONS
Do not drive, operate any machinery, or partake in dangerous activities that require maximum physical and mental performance while taking the prescribed pain killer. Do not take tylenol or tylenol containing products in addition to the pain killer that was prescibed, as the excess tylenol can cause life threatening liver problems. Do not combine this drug with alcohol or other sedatives or narcotics. Follow up with your primary care doctor in regards to the future management of this medication.    Take the flagyl for 10 days - complete the original 7 day course and continue for another 3 days, the rest of th emeds were sent to the pharmacy

## 2021-04-23 NOTE — ED NOTES
"Pt discharged home,  was called for pt ride. Pt A&Ox4, steady gait, educated on abx use and narcotic use and worsening symptoms, pt verbalized understanding. pt in possession of belongings. Pt provided discharge education and information pertaining to medications and follow up appointments. Pt received copy of discharge instructions and verbalized understanding. BP (!) 93/58   Pulse 80   Temp 36.1 °C (97 °F) (Temporal)   Resp 18   Ht 1.549 m (5' 1\")   Wt 69.6 kg (153 lb 7 oz)   LMP 04/14/2021 (Approximate)   SpO2 95%   BMI 28.99 kg/m²   "

## 2021-04-27 ENCOUNTER — APPOINTMENT (OUTPATIENT)
Dept: RADIOLOGY | Facility: MEDICAL CENTER | Age: 26
End: 2021-04-27
Attending: EMERGENCY MEDICINE
Payer: COMMERCIAL

## 2021-04-27 ENCOUNTER — HOSPITAL ENCOUNTER (EMERGENCY)
Facility: MEDICAL CENTER | Age: 26
End: 2021-04-28
Attending: EMERGENCY MEDICINE
Payer: COMMERCIAL

## 2021-04-27 DIAGNOSIS — R10.10 PAIN OF UPPER ABDOMEN: ICD-10-CM

## 2021-04-27 DIAGNOSIS — K76.9 LIVER LESION: ICD-10-CM

## 2021-04-27 LAB
ALBUMIN SERPL BCP-MCNC: 3.9 G/DL (ref 3.2–4.9)
ALBUMIN/GLOB SERPL: 1.1 G/DL
ALP SERPL-CCNC: 81 U/L (ref 30–99)
ALT SERPL-CCNC: 16 U/L (ref 2–50)
ANION GAP SERPL CALC-SCNC: 11 MMOL/L (ref 7–16)
AST SERPL-CCNC: 24 U/L (ref 12–45)
BASOPHILS # BLD AUTO: 0.4 % (ref 0–1.8)
BASOPHILS # BLD: 0.03 K/UL (ref 0–0.12)
BILIRUB SERPL-MCNC: <0.2 MG/DL (ref 0.1–1.5)
BUN SERPL-MCNC: 11 MG/DL (ref 8–22)
CALCIUM SERPL-MCNC: 9.2 MG/DL (ref 8.5–10.5)
CHLORIDE SERPL-SCNC: 108 MMOL/L (ref 96–112)
CO2 SERPL-SCNC: 20 MMOL/L (ref 20–33)
CREAT SERPL-MCNC: 0.77 MG/DL (ref 0.5–1.4)
EOSINOPHIL # BLD AUTO: 0.12 K/UL (ref 0–0.51)
EOSINOPHIL NFR BLD: 1.6 % (ref 0–6.9)
ERYTHROCYTE [DISTWIDTH] IN BLOOD BY AUTOMATED COUNT: 47.2 FL (ref 35.9–50)
GLOBULIN SER CALC-MCNC: 3.5 G/DL (ref 1.9–3.5)
GLUCOSE SERPL-MCNC: 93 MG/DL (ref 65–99)
HCG SERPL QL: NEGATIVE
HCT VFR BLD AUTO: 35.3 % (ref 37–47)
HGB BLD-MCNC: 11.4 G/DL (ref 12–16)
IMM GRANULOCYTES # BLD AUTO: 0.01 K/UL (ref 0–0.11)
IMM GRANULOCYTES NFR BLD AUTO: 0.1 % (ref 0–0.9)
LIPASE SERPL-CCNC: 44 U/L (ref 11–82)
LYMPHOCYTES # BLD AUTO: 3.25 K/UL (ref 1–4.8)
LYMPHOCYTES NFR BLD: 44.3 % (ref 22–41)
MCH RBC QN AUTO: 26.8 PG (ref 27–33)
MCHC RBC AUTO-ENTMCNC: 32.3 G/DL (ref 33.6–35)
MCV RBC AUTO: 83.1 FL (ref 81.4–97.8)
MONOCYTES # BLD AUTO: 0.55 K/UL (ref 0–0.85)
MONOCYTES NFR BLD AUTO: 7.5 % (ref 0–13.4)
NEUTROPHILS # BLD AUTO: 3.37 K/UL (ref 2–7.15)
NEUTROPHILS NFR BLD: 46.1 % (ref 44–72)
NRBC # BLD AUTO: 0 K/UL
NRBC BLD-RTO: 0 /100 WBC
PLATELET # BLD AUTO: 390 K/UL (ref 164–446)
PMV BLD AUTO: 11.3 FL (ref 9–12.9)
POTASSIUM SERPL-SCNC: 3.9 MMOL/L (ref 3.6–5.5)
PROT SERPL-MCNC: 7.4 G/DL (ref 6–8.2)
RBC # BLD AUTO: 4.25 M/UL (ref 4.2–5.4)
SODIUM SERPL-SCNC: 139 MMOL/L (ref 135–145)
TROPONIN T SERPL-MCNC: <6 NG/L (ref 6–19)
WBC # BLD AUTO: 7.3 K/UL (ref 4.8–10.8)

## 2021-04-27 PROCEDURE — 700117 HCHG RX CONTRAST REV CODE 255: Performed by: EMERGENCY MEDICINE

## 2021-04-27 PROCEDURE — 84484 ASSAY OF TROPONIN QUANT: CPT

## 2021-04-27 PROCEDURE — 71045 X-RAY EXAM CHEST 1 VIEW: CPT

## 2021-04-27 PROCEDURE — 700102 HCHG RX REV CODE 250 W/ 637 OVERRIDE(OP): Performed by: EMERGENCY MEDICINE

## 2021-04-27 PROCEDURE — 96374 THER/PROPH/DIAG INJ IV PUSH: CPT

## 2021-04-27 PROCEDURE — A9270 NON-COVERED ITEM OR SERVICE: HCPCS | Performed by: EMERGENCY MEDICINE

## 2021-04-27 PROCEDURE — 93005 ELECTROCARDIOGRAM TRACING: CPT

## 2021-04-27 PROCEDURE — 99285 EMERGENCY DEPT VISIT HI MDM: CPT

## 2021-04-27 PROCEDURE — 36415 COLL VENOUS BLD VENIPUNCTURE: CPT

## 2021-04-27 PROCEDURE — 85025 COMPLETE CBC W/AUTO DIFF WBC: CPT

## 2021-04-27 PROCEDURE — 700111 HCHG RX REV CODE 636 W/ 250 OVERRIDE (IP): Performed by: EMERGENCY MEDICINE

## 2021-04-27 PROCEDURE — 80053 COMPREHEN METABOLIC PANEL: CPT

## 2021-04-27 PROCEDURE — 84703 CHORIONIC GONADOTROPIN ASSAY: CPT

## 2021-04-27 PROCEDURE — 83690 ASSAY OF LIPASE: CPT

## 2021-04-27 PROCEDURE — 93005 ELECTROCARDIOGRAM TRACING: CPT | Performed by: EMERGENCY MEDICINE

## 2021-04-27 RX ADMIN — LIDOCAINE HYDROCHLORIDE 30 ML: 20 SOLUTION OROPHARYNGEAL at 23:03

## 2021-04-27 RX ADMIN — FENTANYL CITRATE 50 MCG: 50 INJECTION, SOLUTION INTRAMUSCULAR; INTRAVENOUS at 23:03

## 2021-04-27 ASSESSMENT — FIBROSIS 4 INDEX: FIB4 SCORE: 0.42

## 2021-04-28 VITALS
RESPIRATION RATE: 16 BRPM | BODY MASS INDEX: 28.32 KG/M2 | OXYGEN SATURATION: 98 % | TEMPERATURE: 98.2 F | SYSTOLIC BLOOD PRESSURE: 104 MMHG | DIASTOLIC BLOOD PRESSURE: 62 MMHG | HEART RATE: 78 BPM | WEIGHT: 150 LBS | HEIGHT: 61 IN

## 2021-04-28 LAB — EKG IMPRESSION: NORMAL

## 2021-04-28 PROCEDURE — 700111 HCHG RX REV CODE 636 W/ 250 OVERRIDE (IP): Performed by: EMERGENCY MEDICINE

## 2021-04-28 PROCEDURE — 700102 HCHG RX REV CODE 250 W/ 637 OVERRIDE(OP): Performed by: EMERGENCY MEDICINE

## 2021-04-28 PROCEDURE — A9270 NON-COVERED ITEM OR SERVICE: HCPCS | Performed by: EMERGENCY MEDICINE

## 2021-04-28 PROCEDURE — 74177 CT ABD & PELVIS W/CONTRAST: CPT

## 2021-04-28 PROCEDURE — 700117 HCHG RX CONTRAST REV CODE 255: Performed by: EMERGENCY MEDICINE

## 2021-04-28 PROCEDURE — 96376 TX/PRO/DX INJ SAME DRUG ADON: CPT

## 2021-04-28 RX ORDER — FAMOTIDINE 20 MG/1
20 TABLET, FILM COATED ORAL ONCE
Status: COMPLETED | OUTPATIENT
Start: 2021-04-28 | End: 2021-04-28

## 2021-04-28 RX ADMIN — FENTANYL CITRATE 50 MCG: 50 INJECTION, SOLUTION INTRAMUSCULAR; INTRAVENOUS at 01:24

## 2021-04-28 RX ADMIN — FAMOTIDINE 20 MG: 20 TABLET ORAL at 01:24

## 2021-04-28 RX ADMIN — IOHEXOL 80 ML: 350 INJECTION, SOLUTION INTRAVENOUS at 00:25

## 2021-04-28 NOTE — ED TRIAGE NOTES
Sadia Troncoso    Chief Complaint   Patient presents with   • Abdominal Pain     Diffuse abd pain. started approx. 2 days ago.      Ipon arrival PT started complaining of L sided weakness and numbness. PT has +pulses in upper and lower extremities, +babinski reflexes in bilateral lower extremities.   PT is appears anxious at this time.       Vitals:    04/27/21 2230   BP: 109/67   Pulse: 98   Resp: 16   Temp: 36.8 °C (98.2 °F)   SpO2: 99%

## 2021-04-28 NOTE — ED PROVIDER NOTES
"ED Provider Note    CHIEF COMPLAINT  Chief Complaint   Patient presents with    Abdominal Pain     Diffuse abd pain. started approx. 2 days ago.        HPI  Sadia Troncoso is a 25 y.o. female who presents to the emergency department complaining of mid epigastric abdominal discomfort radiating into chest. Past medical history significant for borderline personality, anxiety, depression as well as seizure disorder. Chronic tobacco smoker. Also social drinker and recreational marijuana use. She notes that she has had ongoing and progressively worsening pain as noted above. Also has associated nausea and dry he's. No diarrhea urinary symptoms. She does that she was recently here in the emergency room for retaining tampon. She notes that the pelvic exam was otherwise benign workup was negative other than bacterial vaginosis and she finished her antibiotic course for this. Now with upper abdominal discomfort.    REVIEW OF SYSTEMS  See HPI for further details. All other systems are negative.     PAST MEDICAL HISTORY   has a past medical history of Anxiety, Depression, and Seizure disorder (HCC).    SOCIAL HISTORY  Social History     Tobacco Use    Smoking status: Current Every Day Smoker     Packs/day: 0.25     Years: 2.00     Pack years: 0.50     Types: Cigarettes    Smokeless tobacco: Never Used   Substance and Sexual Activity    Alcohol use: Not Currently     Alcohol/week: 1.8 oz     Types: 3 Cans of beer per week     Comment: 3 alcoholic seltzers 1 day a week    Drug use: Yes     Types: Inhaled     Comment: THC CBD    Sexual activity: Not on file       SURGICAL HISTORY  patient denies any surgical history    CURRENT MEDICATIONS  Home Medications    **Home medications have not yet been reviewed for this encounter**         ALLERGIES  No Known Allergies    PHYSICAL EXAM  VITAL SIGNS: /62   Pulse 78   Temp 36.8 °C (98.2 °F) (Temporal)   Resp 16   Ht 1.549 m (5' 1\")   Wt 68 kg (150 lb)   LMP 04/14/2021 " (Approximate)   SpO2 98%   BMI 28.34 kg/m²  @RITESH[[id number]::@   Pulse ox interpretation: I interpret this pulse ox as normal.  Constitutional: anxious  HENT: No signs of trauma, Bilateral external ears normal, Nose normal.   Eyes: Pupils are equal and reactive  Neck: Normal range of motion, No tenderness, Supple  Cardiovascular: Regular rate and rhythm, no murmurs.   Thorax & Lungs: Normal breath sounds  Abdomen: Bowel sounds normal, Soft, mid epigastric tenderness  Skin: Warm, Dry, No erythema, No rash.   Extremities: Intact distal pulses  Musculoskeletal: Good range of motion in all major joints. No tenderness to palpation or major deformities noted.   Neurologic: Alert , Normal motor function, Normal sensory function, No focal deficits noted.   Psychiatric: anxious and hyperventilating      DIAGNOSTIC STUDIES / PROCEDURES      LABS  Results for orders placed or performed during the hospital encounter of 04/27/21   HCG Qual Serum   Result Value Ref Range    Beta-Hcg Qualitative Serum Negative Negative   CBC WITH DIFFERENTIAL   Result Value Ref Range    WBC 7.3 4.8 - 10.8 K/uL    RBC 4.25 4.20 - 5.40 M/uL    Hemoglobin 11.4 (L) 12.0 - 16.0 g/dL    Hematocrit 35.3 (L) 37.0 - 47.0 %    MCV 83.1 81.4 - 97.8 fL    MCH 26.8 (L) 27.0 - 33.0 pg    MCHC 32.3 (L) 33.6 - 35.0 g/dL    RDW 47.2 35.9 - 50.0 fL    Platelet Count 390 164 - 446 K/uL    MPV 11.3 9.0 - 12.9 fL    Neutrophils-Polys 46.10 44.00 - 72.00 %    Lymphocytes 44.30 (H) 22.00 - 41.00 %    Monocytes 7.50 0.00 - 13.40 %    Eosinophils 1.60 0.00 - 6.90 %    Basophils 0.40 0.00 - 1.80 %    Immature Granulocytes 0.10 0.00 - 0.90 %    Nucleated RBC 0.00 /100 WBC    Neutrophils (Absolute) 3.37 2.00 - 7.15 K/uL    Lymphs (Absolute) 3.25 1.00 - 4.80 K/uL    Monos (Absolute) 0.55 0.00 - 0.85 K/uL    Eos (Absolute) 0.12 0.00 - 0.51 K/uL    Baso (Absolute) 0.03 0.00 - 0.12 K/uL    Immature Granulocytes (abs) 0.01 0.00 - 0.11 K/uL    NRBC (Absolute) 0.00 K/uL   COMP  METABOLIC PANEL   Result Value Ref Range    Sodium 139 135 - 145 mmol/L    Potassium 3.9 3.6 - 5.5 mmol/L    Chloride 108 96 - 112 mmol/L    Co2 20 20 - 33 mmol/L    Anion Gap 11.0 7.0 - 16.0    Glucose 93 65 - 99 mg/dL    Bun 11 8 - 22 mg/dL    Creatinine 0.77 0.50 - 1.40 mg/dL    Calcium 9.2 8.5 - 10.5 mg/dL    AST(SGOT) 24 12 - 45 U/L    ALT(SGPT) 16 2 - 50 U/L    Alkaline Phosphatase 81 30 - 99 U/L    Total Bilirubin <0.2 0.1 - 1.5 mg/dL    Albumin 3.9 3.2 - 4.9 g/dL    Total Protein 7.4 6.0 - 8.2 g/dL    Globulin 3.5 1.9 - 3.5 g/dL    A-G Ratio 1.1 g/dL   LIPASE   Result Value Ref Range    Lipase 44 11 - 82 U/L   TROPONIN   Result Value Ref Range    Troponin T <6 6 - 19 ng/L   ESTIMATED GFR   Result Value Ref Range    GFR If African American >60 >60 mL/min/1.73 m 2    GFR If Non African American >60 >60 mL/min/1.73 m 2   EKG   Result Value Ref Range    Report       [facility] Emergency Dept.    Test Date:  2021  Pt Name:    JONATHAN DICKERSON                 Department: ER  MRN:        [id number]                      Room:       [facility]  Gender:     Female                       Technician: [id number]  :        1995                   Requested By:ER TRIAGE PROTOCOL  Order #:    [id number]                    Reading MD: Jono Mesa    Measurements  Intervals                                Axis  Rate:       79                           P:          13  LA:         184                          QRS:        57  QRSD:       88                           T:          52  QT:         384  QTc:        441    Interpretive Statements  SINUS RHYTHM  Compared to ECG 10/20/2020 02:34:58  ST (T wave) deviation no longer present  Possible ischemia no longer present  Electronically Signed On 2021 1:11:49 PDT by Jono Mesa           RADIOLOGY  CT-ABDOMEN-PELVIS WITH   Final Result         1.  No acute abnormality.   2.  Low-density lesions in the liver, indeterminate, recommend follow-up or MRI of the liver  for further characterization.      DX-CHEST-LIMITED (1 VIEW)   Final Result         1.  No acute cardiopulmonary disease.              COURSE & MEDICAL DECISION MAKING  Pertinent Labs & Imaging studies reviewed. (See chart for details)  25-year-old female presented to the emergency room with mid epigastric abdominal pain. Past medical history as documented below most significant for the fact that she was recently in the emergency room for retaining tampon. She was found to have BV at that time and did take a course of antibiotics for treatment of this. She additionally has a history of C. diff. This is her primary concern of hers today given the fact that she has had slightly loose stool after taking her antibiotic. She however is unable to produce stool here although I have agreed to ordering C. diff testing should she be able to produce stool. Overall from vital signs standpoint the patient appears quite well. Initial workup with lab, chest x-ray and CT imaging all negative. She is not pregnant. No leukocytosis. No anemia platelet deviation. No electrolyte or renal abnormalities. No abnormalities and LFTs. Very low risk from an ACS standpoint and low heart score but troponin, EKG and chest x-ray all negative from this standpoint. I do believe she may have a component of esophagitis gastritis or ulcerative disease especially given her recent antibiotic and medication use. She is additionally concerned about H. pylori given the fact that her significant other has had this remotely. This point believe that the patient will be best served by outpatient followed by gastroenterology. I have offered further ultrasound imaging of the liver and gallbladder despite otherwise negative workup as far as the patient continues to complain of pain. Patient was given significant doses of pain medications and said it is from EMS and has continued to get medications here in the ER for myself without any significant improvement. She  has become quite verbally abusive to staff. She is additionally escalated tone and sentiment with me. I have had bedside conversation with significant other as well as mother over the phone. At this point they understanding of our accomplishments and findings are at this point. Given the patient's continued pain despite exact etiology other than mentioned above I have offered to discuss possibilities of inpatient stay for pain management should she feel that is appropriate. At this point future decision-making she wishes to be discharged home and is understanding she has a follow-up referral to her primary care physician and gastroenterology as referred today. Additionally she is understanding return precautions should she have any changes, worsening or persistence of her symptoms thus far parents.    The patient will return for worsening symptoms and is stable at the time of discharge. The patient verbalizes understanding and will comply.    FINAL IMPRESSION  1. Pain of upper abdomen    2. Liver lesion            Electronically signed by: Jono Mesa M.D., 4/27/2021 11:02 PM

## 2021-05-10 ENCOUNTER — HOSPITAL ENCOUNTER (OUTPATIENT)
Dept: LAB | Facility: MEDICAL CENTER | Age: 26
End: 2021-05-10
Attending: SPECIALIST
Payer: COMMERCIAL

## 2021-05-10 PROCEDURE — 36415 COLL VENOUS BLD VENIPUNCTURE: CPT

## 2021-05-10 PROCEDURE — 84702 CHORIONIC GONADOTROPIN TEST: CPT

## 2021-05-11 LAB — B-HCG SERPL-ACNC: <1 MIU/ML (ref 0–5)

## 2021-11-01 ENCOUNTER — APPOINTMENT (OUTPATIENT)
Dept: RADIOLOGY | Facility: MEDICAL CENTER | Age: 26
End: 2021-11-01
Attending: EMERGENCY MEDICINE
Payer: COMMERCIAL

## 2021-11-01 ENCOUNTER — HOSPITAL ENCOUNTER (EMERGENCY)
Facility: MEDICAL CENTER | Age: 26
End: 2021-11-01
Attending: EMERGENCY MEDICINE
Payer: COMMERCIAL

## 2021-11-01 VITALS
OXYGEN SATURATION: 97 % | WEIGHT: 167.77 LBS | HEIGHT: 61 IN | HEART RATE: 84 BPM | RESPIRATION RATE: 18 BRPM | BODY MASS INDEX: 31.68 KG/M2 | DIASTOLIC BLOOD PRESSURE: 72 MMHG | SYSTOLIC BLOOD PRESSURE: 120 MMHG | TEMPERATURE: 97.5 F

## 2021-11-01 DIAGNOSIS — R05.9 COUGH: ICD-10-CM

## 2021-11-01 DIAGNOSIS — J06.9 VIRAL URI: ICD-10-CM

## 2021-11-01 LAB — EKG IMPRESSION: NORMAL

## 2021-11-01 PROCEDURE — 700102 HCHG RX REV CODE 250 W/ 637 OVERRIDE(OP): Performed by: EMERGENCY MEDICINE

## 2021-11-01 PROCEDURE — 71046 X-RAY EXAM CHEST 2 VIEWS: CPT

## 2021-11-01 PROCEDURE — 93005 ELECTROCARDIOGRAM TRACING: CPT

## 2021-11-01 PROCEDURE — 93005 ELECTROCARDIOGRAM TRACING: CPT | Performed by: EMERGENCY MEDICINE

## 2021-11-01 PROCEDURE — 99283 EMERGENCY DEPT VISIT LOW MDM: CPT

## 2021-11-01 PROCEDURE — U0005 INFEC AGEN DETEC AMPLI PROBE: HCPCS

## 2021-11-01 PROCEDURE — U0003 INFECTIOUS AGENT DETECTION BY NUCLEIC ACID (DNA OR RNA); SEVERE ACUTE RESPIRATORY SYNDROME CORONAVIRUS 2 (SARS-COV-2) (CORONAVIRUS DISEASE [COVID-19]), AMPLIFIED PROBE TECHNIQUE, MAKING USE OF HIGH THROUGHPUT TECHNOLOGIES AS DESCRIBED BY CMS-2020-01-R: HCPCS

## 2021-11-01 PROCEDURE — A9270 NON-COVERED ITEM OR SERVICE: HCPCS | Performed by: EMERGENCY MEDICINE

## 2021-11-01 RX ORDER — HYDROCODONE BITARTRATE AND ACETAMINOPHEN 5; 325 MG/1; MG/1
1 TABLET ORAL EVERY 4 HOURS PRN
Qty: 8 TABLET | Refills: 0 | Status: SHIPPED | OUTPATIENT
Start: 2021-11-01 | End: 2021-11-04

## 2021-11-01 RX ORDER — HYDROCODONE BITARTRATE AND ACETAMINOPHEN 5; 325 MG/1; MG/1
1 TABLET ORAL ONCE
Status: COMPLETED | OUTPATIENT
Start: 2021-11-01 | End: 2021-11-01

## 2021-11-01 RX ORDER — IBUPROFEN 600 MG/1
600 TABLET ORAL ONCE
Status: COMPLETED | OUTPATIENT
Start: 2021-11-01 | End: 2021-11-01

## 2021-11-01 RX ADMIN — IBUPROFEN 600 MG: 600 TABLET, FILM COATED ORAL at 16:32

## 2021-11-01 RX ADMIN — HYDROCODONE BITARTRATE AND ACETAMINOPHEN 1 TABLET: 5; 325 TABLET ORAL at 18:00

## 2021-11-01 ASSESSMENT — LIFESTYLE VARIABLES: DO YOU DRINK ALCOHOL: NO

## 2021-11-01 ASSESSMENT — FIBROSIS 4 INDEX: FIB4 SCORE: .4153846153846153846

## 2021-11-01 NOTE — ED PROVIDER NOTES
ED Provider Note    Scribed for Bess Brooks M.D. by Tay Latif. 11/1/2021  4:01 PM    Means of arrival: Walk-in  History obtained from: Patient  History limited by: None      CHIEF COMPLAINT  Chief Complaint   Patient presents with   • Cough     pt c/o cough, sob and pain with inspiration x 4 days    • Shortness of Breath   • Chest Wall Pain       HPI  Sadia Troncoso is a 25 y.o. female who presents to the Emergency Department for a cough onset six days ago. She notes that she was seen by her primary care physician for her cough and was prescribed codeine cough syrup, she ran out of the medication yesterday and has been in pain since. She has associated symptoms of generalized aching pain, shortness of breath and sharp chest pains with breathing, and diarrhea. She denies any mucous when coughing, fever, swelling in her legs, or vomiting. She took ibuprofen and tylenol a few hours ago to alleviate her symptoms, to no alleviation. She denies there being any chance she is pregnant or any other major medical problems. She is vaccinated for COVID-19, she received her second shot three weeks ago.     REVIEW OF SYSTEMS  Pertinent positive include cough, generalized aching pain, shortness of breath and sharp pains with breathing, and diarrhea. Pertinent negative include no mucous when coughing, fever, swelling in her legs, or vomiting. All other systems reviewed and are negative.      PAST MEDICAL HISTORY   has a past medical history of Anxiety, Depression, and Seizure disorder (HCC).    SOCIAL HISTORY  Social History     Tobacco Use   • Smoking status: Current Every Day Smoker     Packs/day: 0.25     Years: 2.00     Pack years: 0.50     Types: Cigarettes   • Smokeless tobacco: Never Used   Vaping Use   • Vaping Use: Never used   Substance and Sexual Activity   • Alcohol use: Not Currently     Alcohol/week: 1.8 oz     Types: 3 Cans of beer per week     Comment: 3 alcoholic seltzers 1 day a week   • Drug use: Yes  "    Types: Inhaled     Comment: THC CBD       SURGICAL HISTORY  patient denies any surgical history    CURRENT MEDICATIONS  No current outpatient medications    ALLERGIES  No Known Allergies    PHYSICAL EXAM   VITAL SIGNS: /70   Pulse 86   Temp 36.2 °C (97.2 °F) (Temporal)   Resp 16   Ht 1.549 m (5' 1\")   Wt 76.1 kg (167 lb 12.3 oz)   LMP 2021   SpO2 96%   BMI 31.70 kg/m²    Constitutional: Nontoxic appearing young female, Alert in no apparent distress.  HENT: Normocephalic, Atraumatic. Bilateral external ears normal. Nose normal.  Moist mucous membranes.  Oropharynx clear.  Eyes: Pupils are equal and reactive. Conjunctiva normal.   Neck: Supple, full range of motion  Heart: Regular rate and rhythm.  No murmurs.    Lungs: No respiratory distress, normal work of breathing. Lungs clear to auscultation bilaterally.  Abdomen Soft, no distention.  No tenderness to palpation.  Musculoskeletal: Atraumatic. No obvious deformities noted.  No lower extremity edema.  Skin: Warm, Dry.  No erythema, No rash.   Neurologic: Alert and oriented x3. Moving all extremities spontaneously without focal deficits.  Psychiatric: Affect normal, Mood normal, Appears appropriate and not intoxicated.      DIAGNOSTIC STUDIES    EKG  Results for orders placed or performed during the hospital encounter of 21   EKG   Result Value Ref Range    Report       [facility] Emergency Dept.    Test Date:  2021  Pt Name:    JONATHAN DICKERSON                 Department: ER  MRN:        [id number]                      Room:  Gender:     Female                       Technician: [id number]  :        1995                   Requested By:ER TRIAGE PROTOCOL  Order #:    [id number]                    Reading MD: Bess Brooks MD    Measurements  Intervals                                Axis  Rate:       83                           P:          12  WV:         164                          QRS:        57  QRSD:       86  " "                         T:          49  QT:         380  QTc:        447    Interpretive Statements  SINUS RHYTHM  Normal axis and intervals  No ectopy  No ST or T wave change  Compared to ECG 04/27/2021 22:35:52  No significant changes  Electronically Signed On 11-1-2021 15:58:04 PDT by Bess Brooks MD         LABS  Personally reviewed by me  Labs Reviewed   SARS-COV-2, PCR (IN-HOUSE)    Narrative:     Have you been in close contact with a person who is suspected  or known to be positive for COVID-19 within the last 30 days  (e.g. last seen that person < 30 days ago)->Unknown           RADIOLOGY  Personally reviewed by me  DX-CHEST-2 VIEWS   Final Result      No active disease.              ED COURSE  Vitals:    11/01/21 1446 11/01/21 1447 11/01/21 1800   BP: 121/70  120/72   Pulse: 86  84   Resp: 16  18   Temp: 36.2 °C (97.2 °F)  36.4 °C (97.5 °F)   TempSrc: Temporal  Temporal   SpO2: 96%  97%   Weight:  76.1 kg (167 lb 12.3 oz)    Height: 1.549 m (5' 1\")           Medications administered:  Medications   ibuprofen (MOTRIN) tablet 600 mg (600 mg Oral Given 11/1/21 1632)   HYDROcodone-acetaminophen (NORCO) 5-325 MG per tablet 1 Tablet (1 Tablet Oral Given 11/1/21 1800)         4:01 PM Patient seen and examined at bedside. The patient presents with a cough. Ordered for DX-Chest, EKG, and SARS-CoV-2 PCR to evaluate. Patient will be treated with motrin 600 mg for her symptoms.     MEDICAL DECISION MAKING  Young otherwise healthy patient presents with chest pain in the setting of viral upper respiratory infection.  She is afebrile with normal vital signs on arrival however uncomfortable appearing.  Her EKG does not show evidence of ischemia or arrhythmia.  Clinically doubt ACS or pulmonary embolism.  Her chest x-ray does not show pneumonia, pneumothorax, pulmonary edema.  Covid test is pending at this time.  Patient will be discharged home with symptomatic care for possibly chest muscle strain from coughing as well " as continued care for viral illness.    6:01 PM - Upon reassessment, patient is resting comfortably with normal vital signs.  No new complaints at this time.  Discussed results with patient and/or family as well as importance of primary care follow up.  Patient understands plan of care and strict return precautions for new or changing symptoms.         DISPOSITION:  Patient will be discharged home in stable condition.    FOLLOW UP:  Kishan Alanis M.D.  [address] [place] [id number]  [phone]    Schedule an appointment as soon as possible for a visit       [facility], Emergency Dept  [address] [facility] [place] [address] [phone]    If symptoms worsen          IMPRESSION  (J06.9) Viral URI  (R05.9) Cough    Results, diagnoses, and treatment options were discussed with the patient and/or family. Patient verbalized understanding of plan of care.    Discharge Medication List as of 11/1/2021  6:01 PM      START taking these medications    Details   HYDROcodone-acetaminophen (NORCO) 5-325 MG Tab per tablet Take 1 Tablet by mouth every four hours as needed for up to 3 days., Disp-8 Tablet, R-0, Normal             In prescribing controlled substances to this patient, I certify that I have obtained and reviewed the medical history of Sadia Troncoso. I have also made a good devin effort to obtain applicable records from other providers who have treated the patient and records did not demonstrate any increased risk of substance abuse that would prevent me from prescribing controlled substances.     I have conducted a physical exam and documented it. I have reviewed Ms. Troncoso’s prescription history as maintained by the [place] Prescription Monitoring Program.     I have assessed the patient’s risk for abuse, dependency, and addiction using the validated Opioid Risk Tool available at https://www.mdcalc.com/mpgkne-gklv-szfb-ort-narcotic-abuse.     Given the above, I believe the  benefits of controlled substance therapy outweigh the risks. The reasons for prescribing controlled substances include non-narcotic, oral analgesic alternatives have been inadequate for pain control. Accordingly, I have discussed the risk and benefits, treatment plan, and alternative therapies with the patient.        Tay GONCALVES (Maribeth), am scribing for, and in the presence of, Bess Brooks M.D..    Electronically signed by: Tay Latif (Maribeth), 11/1/2021    Bess GONCALVES M.D. personally performed the services described in this documentation, as scribed by Tay Latif in my presence, and it is both accurate and complete. C.    The note accurately reflects work and decisions made by me.  Bess Brooks M.D.  11/2/2021  12:17 AM

## 2021-11-01 NOTE — ED TRIAGE NOTES
Pt to triage .  Chief Complaint   Patient presents with   • Cough     pt c/o cough, sob and pain with inspiration x 4 days    • Shortness of Breath   • Chest Wall Pain

## 2021-11-02 LAB
SARS-COV-2 RNA RESP QL NAA+PROBE: NOTDETECTED
SPECIMEN SOURCE: NORMAL

## 2021-11-02 NOTE — ED NOTES
Pt discharged to home. Pt was given follow up instructions and prescriptions for norco. Pt verbalized understanding of all instructions for discharge and is ambulatory out of ED with steady gait. AOx4

## 2021-11-02 NOTE — DISCHARGE INSTRUCTIONS
You were seen in the Emergency Department for viral illness.    Please use tylenol or ibuprofen every 6 hours as needed for pain/fever.  Encourage fluid intake.    Please follow up with your primary care physician.    Return to the Emergency Department with persistent fevers greater than 100.4 for greater than 4-5 days, trouble breathing, persistent vomiting, decreased urine output, or other concerns.    You likely have a viral illness and may have COVID-19.   Testing is pending at this time and may take 1-2 days to return.  Please act as if these results are positive and self isolate until you receive the results. Make sure you still wear a mask, social distance and practice good hand hygiene no matter the result.     Please logon to Pact for results. You can login or create an account for Visi[employer]ate at [employer]Visum.     Therefore, COVID-19 is not ruled out and you will need to remain in home quarantine until all three of the following are true:  You are 10 days from symptom onset  your symptoms are essentially resolved   you have been fever free for at least 72hours.    If you develop significant shortness of breath, meaning that it is difficult for you to walk even short distances without having to stop and catch your breath, or you become severely dizzy and this is persistent then please return to the emergency department.    For a more objective approach you can buy a pulse oximeter online. [employer] has multiple to chose from. If your oxygen saturation in these devices is persistently below 90% please return to the ER.

## 2021-11-13 ENCOUNTER — OFFICE VISIT (OUTPATIENT)
Dept: URGENT CARE | Facility: CLINIC | Age: 26
End: 2021-11-13
Payer: COMMERCIAL

## 2021-11-13 VITALS
SYSTOLIC BLOOD PRESSURE: 118 MMHG | WEIGHT: 162.4 LBS | BODY MASS INDEX: 30.66 KG/M2 | HEIGHT: 61 IN | TEMPERATURE: 97.5 F | RESPIRATION RATE: 18 BRPM | OXYGEN SATURATION: 95 % | HEART RATE: 106 BPM | DIASTOLIC BLOOD PRESSURE: 78 MMHG
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
## 2021-11-13 PROCEDURE — 99213 OFFICE O/P EST LOW 20 MIN: CPT | Performed by: FAMILY MEDICINE

## 2021-11-13 RX ORDER — AMOXICILLIN AND CLAVULANATE POTASSIUM 875; 125 MG/1; MG/1
1 TABLET, FILM COATED ORAL 2 TIMES DAILY
Qty: 14 TABLET | Refills: 0 | Status: SHIPPED | OUTPATIENT
Start: 2021-11-13 | End: 2021-11-20

## 2021-11-13 ASSESSMENT — FIBROSIS 4 INDEX: FIB4 SCORE: .4153846153846153846

## 2021-11-13 NOTE — PROGRESS NOTES
"Subjective:      Chief Complaint   Patient presents with   • Animal Bite     Cat bite                                  S/p  Cat bite to  Right  Forearm 3 hr PTA     pain is minor.     Cat was unknown to pt (stray)    TDaP is UTD    Social History     Tobacco Use   • Smoking status: Current Every Day Smoker     Packs/day: 0.25     Years: 2.00     Pack years: 0.50     Types: Cigarettes   • Smokeless tobacco: Never Used   Vaping Use   • Vaping Use: Never used   Substance Use Topics   • Alcohol use: Not Currently     Alcohol/week: 1.8 oz     Types: 3 Cans of beer per week     Comment: 3 alcoholic seltzers 1 day a week   • Drug use: Yes     Types: Inhaled     Comment: THC CBD           No current outpatient medications on file prior to visit.     No current facility-administered medications on file prior to visit.          Past Medical History:   Diagnosis Date   • Anxiety    • Depression    • Seizure disorder (HCC)               Review of Systems   Constitutional: Negative for fever.   Genitourinary: Negative for hematuria.   Neurological: Negative for tingling and loss of consciousness.          Objective:     /78 (BP Location: Right arm, Patient Position: Sitting, BP Cuff Size: Adult)   Pulse (!) 106   Temp 36.4 °C (97.5 °F) (Temporal)   Resp 18   Ht 1.549 m (5' 1\")   Wt 73.7 kg (162 lb 6.4 oz)   SpO2 95%       Physical Exam   Constitutional: She is oriented to person, place, and time. Pt appears well-developed and well-nourished. No distress.   HENT:   Head: Normocephalic and atraumatic.   Eyes: Conjunctivae are normal.   Cardiovascular: Normal rate, regular rhythm and normal heart sounds.    Pulmonary/Chest: Effort normal and breath sounds normal. No respiratory distress. Pt has no wheezes.   Musculoskeletal:   Rt forearm:   There is one 4 cm linear scratch and 2 smaller, approximately 1-2 cm very superficial bite wounds noted.    There is no active bleeding.    + minor TTP  Neurological: pt is alert and " oriented to person, place, and time. No cranial nerve deficit.   Skin: Skin is warm. Pt is not diaphoretic. No erythema.   Nursing note and vitals reviewed.              Assessment/Plan:     1. Cat bite, initial encounter     - amoxicillin-clavulanate (AUGMENTIN) 875-125 MG Tab; Take 1 Tablet by mouth 2 times a day for 7 days.  Dispense: 14 Tablet; Refill: 0       Follow up in one week if no improvement, sooner if symptoms worsen.

## 2021-11-18 PROBLEM — F33.2 SEVERE RECURRENT MAJOR DEPRESSION (HCC): Status: ACTIVE | Noted: 2021-08-18

## 2022-01-02 ENCOUNTER — HOSPITAL ENCOUNTER (EMERGENCY)
Facility: MEDICAL CENTER | Age: 27
End: 2022-01-02
Attending: EMERGENCY MEDICINE
Payer: COMMERCIAL

## 2022-01-02 VITALS
WEIGHT: 167.77 LBS | SYSTOLIC BLOOD PRESSURE: 106 MMHG | HEIGHT: 62 IN | TEMPERATURE: 97.8 F | RESPIRATION RATE: 18 BRPM | BODY MASS INDEX: 30.87 KG/M2 | OXYGEN SATURATION: 97 % | HEART RATE: 68 BPM | DIASTOLIC BLOOD PRESSURE: 62 MMHG

## 2022-01-02 DIAGNOSIS — J06.9 UPPER RESPIRATORY TRACT INFECTION, UNSPECIFIED TYPE: ICD-10-CM

## 2022-01-02 LAB
FLUAV RNA SPEC QL NAA+PROBE: NEGATIVE
FLUBV RNA SPEC QL NAA+PROBE: NEGATIVE
RSV RNA SPEC QL NAA+PROBE: NEGATIVE
SARS-COV-2 RNA RESP QL NAA+PROBE: NOTDETECTED
SPECIMEN SOURCE: NORMAL

## 2022-01-02 PROCEDURE — 96372 THER/PROPH/DIAG INJ SC/IM: CPT

## 2022-01-02 PROCEDURE — 700111 HCHG RX REV CODE 636 W/ 250 OVERRIDE (IP): Performed by: EMERGENCY MEDICINE

## 2022-01-02 PROCEDURE — C9803 HOPD COVID-19 SPEC COLLECT: HCPCS | Performed by: EMERGENCY MEDICINE

## 2022-01-02 PROCEDURE — 99283 EMERGENCY DEPT VISIT LOW MDM: CPT

## 2022-01-02 PROCEDURE — 0241U HCHG SARS-COV-2 COVID-19 NFCT DS RESP RNA 4 TRGT MIC: CPT

## 2022-01-02 RX ORDER — KETOROLAC TROMETHAMINE 30 MG/ML
60 INJECTION, SOLUTION INTRAMUSCULAR; INTRAVENOUS ONCE
Status: COMPLETED | OUTPATIENT
Start: 2022-01-02 | End: 2022-01-02

## 2022-01-02 RX ORDER — TRIAMCINOLONE ACETONIDE 55 UG/1
2 SPRAY, METERED NASAL DAILY
Qty: 1 EACH | Refills: 0 | Status: SHIPPED | OUTPATIENT
Start: 2022-01-02 | End: 2022-01-20

## 2022-01-02 RX ADMIN — KETOROLAC TROMETHAMINE 60 MG: 30 INJECTION, SOLUTION INTRAMUSCULAR; INTRAVENOUS at 13:55

## 2022-01-02 ASSESSMENT — FIBROSIS 4 INDEX: FIB4 SCORE: .4307692307692307692

## 2022-01-02 NOTE — ED PROVIDER NOTES
CHIEF COMPLAINT  Chief Complaint   Patient presents with   • Headache   • Sore Throat   • Generalized Body Aches       HPI  Sadia Troncoso is a 26 y.o. female who presents with cough congestion sore throat headache and sinus pain.  This has been going on for about 5 days.  The patient is unvaccinated.  She notes no fever.  No significant shortness of breath.  No chest pain.  No nausea vomiting or diarrhea.  No abdominal pain.  No burning with urination.    REVIEW OF SYSTEMS  All other systems are negative.     PAST MEDICAL HISTORY  Past Medical History:   Diagnosis Date   • Anxiety    • Depression    • Seizure disorder (HCC)        FAMILY HISTORY  Family History   Problem Relation Age of Onset   • Seizures Maternal Grandmother    • Seizures Maternal Grandfather    • Seizures Paternal Grandmother    • Seizures Paternal Grandfather        SOCIAL HISTORY  Social History     Socioeconomic History   • Marital status:      Spouse name: Not on file   • Number of children: 2   • Years of education: Not on file   • Highest education level: Not on file   Occupational History   • Occupation:      Employer: [employer]   Tobacco Use   • Smoking status: Current Every Day Smoker     Packs/day: 0.25     Years: 2.00     Pack years: 0.50     Types: Cigarettes   • Smokeless tobacco: Never Used   Vaping Use   • Vaping Use: Never used   Substance and Sexual Activity   • Alcohol use: Not Currently     Alcohol/week: 1.8 oz     Types: 3 Cans of beer per week     Comment: 3 alcoholic seltzers 1 day a week   • Drug use: Yes     Types: Inhaled     Comment: THC CBD   • Sexual activity: Not on file   Other Topics Concern   • Not on file   Social History Narrative   • Not on file     Social Determinants of Health     Financial Resource Strain:    • Difficulty of Paying Living Expenses: Not on file   Food Insecurity:    • Worried About Running Out of Food in the Last Year: Not on file   • Ran Out of Food in the  "Last Year: Not on file   Transportation Needs:    • Lack of Transportation (Medical): Not on file   • Lack of Transportation (Non-Medical): Not on file   Physical Activity:    • Days of Exercise per Week: Not on file   • Minutes of Exercise per Session: Not on file   Stress:    • Feeling of Stress : Not on file   Social Connections:    • Frequency of Communication with Friends and Family: Not on file   • Frequency of Social Gatherings with Friends and Family: Not on file   • Attends Christianity Services: Not on file   • Active Member of Clubs or Organizations: Not on file   • Attends Club or Organization Meetings: Not on file   • Marital Status: Not on file   Intimate Partner Violence:    • Fear of Current or Ex-Partner: Not on file   • Emotionally Abused: Not on file   • Physically Abused: Not on file   • Sexually Abused: Not on file   Housing Stability:    • Unable to Pay for Housing in the Last Year: Not on file   • Number of Places Lived in the Last Year: Not on file   • Unstable Housing in the Last Year: Not on file       SURGICAL HISTORY  Past Surgical History:   Procedure Laterality Date   • APPENDECTOMY         CURRENT MEDICATIONS  Home Medications    **Home medications have not yet been reviewed for this encounter**         ALLERGIES  No Known Allergies    PHYSICAL EXAM  VITAL SIGNS: /71   Pulse 82   Temp 36.7 °C (98 °F) (Temporal)   Resp 20   Ht 1.575 m (5' 2\")   Wt 76.1 kg (167 lb 12.3 oz)   LMP 12/26/2021   SpO2 95%   BMI 30.69 kg/m²      Constitutional: Well developed, Well nourished, No acute distress, Non-toxic appearance.   HENT: Normocephalic, Atraumatic, TMs normal, mucous membranes moist, no erythema, exudates, swelling, or masses, nares mildly hyperemic  Eyes: nonicteric  Neck: Supple, no meningismus  Lymphatic: No lymphadenopathy noted.   Cardiovascular: Regular rate and rhythm, no gallops rubs or murmurs  Lungs: Clear bilaterally   Abdomen: Soft and nontender  Skin: Warm, Dry, no " rash  Genitalia: Deferred  Rectal: Deferred  Extremities: No edema  Neurologic: Alert, appropriate, follows commands, moving all extremities, normal speech   Psychiatric: Affect normal    COURSE & MEDICAL DECISION MAKING  Pertinent Labs & Imaging studies reviewed. (See chart for details)  This is a 26-year-old female who presents with what appears to be a upper respiratory infection.  COVID-19 is negative.  At this time I do not have a strong suspicion for pneumonia.  Patient will be treated with a nasal decongestant.  I do not have a strong suspicion for bacterial sinusitis.  I do not have a strong suspicion for strep pharyngitis.    FINAL IMPRESSION  1.  URI  2.   3.         Electronically signed by: Shankar Mackenzie M.D., 1/2/2022 1:37 PM

## 2022-01-02 NOTE — ED NOTES
COVID swab done and sent to lab  IM med given per order  Pt is now wanting medication if she is positive for COVID  MD aware  Pt aware of wait time for results of swab  Comfort measures given  In NAD at present

## 2022-01-02 NOTE — ED TRIAGE NOTES
Pt presents by self from home for headache, sore throat, runny nose, and chest congestion that started 5 days ago. Using OTC meds without relief. A&Ox4 and ambulatory.     Has this patient been vaccinated for COVID no

## 2022-01-02 NOTE — ED NOTES
MD re-evaluated pt and she is now cleared for d/c  dischg instructions given to pt  Verbally understands  D/c'ed to home in NAD   Aware that Rx nasacort was sent to listed pharmacy  She is instructed to  and use as prescribed to f/u w/ PCP as needed

## 2022-01-20 ENCOUNTER — TELEMEDICINE (OUTPATIENT)
Dept: MEDICAL GROUP | Facility: LAB | Age: 27
End: 2022-01-20
Payer: COMMERCIAL

## 2022-01-20 VITALS — BODY MASS INDEX: 32.1 KG/M2 | HEIGHT: 61 IN | WEIGHT: 170 LBS

## 2022-01-20 DIAGNOSIS — Z20.822 CLOSE EXPOSURE TO COVID-19 VIRUS: ICD-10-CM

## 2022-01-20 DIAGNOSIS — K76.9 LIVER DISEASE: ICD-10-CM

## 2022-01-20 DIAGNOSIS — R53.82 CHRONIC FATIGUE: Primary | ICD-10-CM

## 2022-01-20 DIAGNOSIS — D64.9 ANEMIA, UNSPECIFIED TYPE: ICD-10-CM

## 2022-01-20 DIAGNOSIS — E87.1 HYPONATREMIA: ICD-10-CM

## 2022-01-20 DIAGNOSIS — N92.1 MENORRHAGIA WITH IRREGULAR CYCLE: ICD-10-CM

## 2022-01-20 PROCEDURE — 99204 OFFICE O/P NEW MOD 45 MIN: CPT | Performed by: PHYSICIAN ASSISTANT

## 2022-01-20 RX ORDER — CLONAZEPAM 1 MG/1
TABLET ORAL
COMMUNITY
Start: 2021-12-05 | End: 2022-02-18 | Stop reason: SDUPTHER

## 2022-01-20 ASSESSMENT — FIBROSIS 4 INDEX: FIB4 SCORE: .4307692307692307692

## 2022-01-20 NOTE — ASSESSMENT & PLAN NOTE
New to me; Pt reports that she has had symptoms x2 days - that includes headache, body aches.   She does not have a cough.   Trying to stay well hydrated.

## 2022-01-23 PROBLEM — K76.9 LIVER DISEASE: Status: ACTIVE | Noted: 2022-01-23

## 2022-01-23 NOTE — ASSESSMENT & PLAN NOTE
"New to me; chronic and per pt - quite severe.   Reports that she is \"bed ridden\"  Does have children and reports that it is very difficult to care for them due to severe and intense fatigue symptoms.   She has been diagnosed with depression and psychogenic seizures, though she reports the depression is stemming as a result of the extreme fatigue she is feeling.   Pt does not currently using any medication or supplements besides intermittent use of Clonazepam.     She has a know history of anemia from the past that has not been rechecked recently.   Does endorse heavy and irregular menses.  Has not had pelvic imaging recently.   "

## 2022-01-23 NOTE — PROGRESS NOTES
"This evaluation was conducted via Zoom using secure and encrypted videoconferencing technology. The patient was in a private location in the state [facility].    The patient's identity was confirmed and verbal consent was obtained for this virtual visit.    Subjective:     CC: Establish care, severe fatigue   Sadia Troncoso is a 26 y.o. female presenting to establish care and to discuss the evaluation and management of severe fatigue, COVID exposure     Close exposure to COVID-19 virus  New to me; Pt reports that she has had symptoms x2 days - that includes headache, body aches.   She does not have a cough.   Trying to stay well hydrated.         Chronic fatigue  New to me; chronic and per pt - quite severe.   Reports that she is \"bed ridden\"  Does have children and reports that it is very difficult to care for them due to severe and intense fatigue symptoms.   She has been diagnosed with depression and psychogenic seizures, though she reports the depression is stemming as a result of the extreme fatigue she is feeling.   Pt does not currently using any medication or supplements besides intermittent use of Clonazepam.     She has a know history of anemia from the past that has not been rechecked recently.   Does endorse heavy and irregular menses.  Has not had pelvic imaging recently.     Liver disease  New to me; in need for Abdominal MRI for further evaluation of liver lesions seen on CT abdomen from 4/2021  IMPRESSION:  1.  No acute abnormality.  2.  Low-density lesions in the liver, indeterminate, recommend follow-up or MRI of the liver for further characterization.      ROS  Constitutional: Negative for fever, chills and +severe fatigue  HENT: Negative for congestion.    Eyes: Negative for pain.   Respiratory: Negative for cough and shortness of breath.    Cardiovascular: Negative for leg swelling.   Gastrointestinal: Negative for nausea, vomiting, abdominal pain and diarrhea.   Genitourinary: Negative for " "dysuria and hematuria.   Skin: Negative for rash.   Endo/Heme/Allergies: Does not bruise/bleed easily.   Psychiatric/Behavioral: +depression +anxiety symptoms     No Known Allergies    Current medicines (including changes today)  Current Outpatient Medications   Medication Sig Dispense Refill   • clonazePAM (KLONOPIN) 1 MG Tab        No current facility-administered medications for this visit.       She  has a past medical history of Anxiety, Depression, and Seizure disorder (HCC).  She  has a past surgical history that includes appendectomy.      Family History   Problem Relation Age of Onset   • Seizures Maternal Grandmother    • Seizures Maternal Grandfather    • Seizures Paternal Grandmother    • Seizures Paternal Grandfather      Family Status   Relation Name Status   • MGMo  (Not Specified)   • MGFa  (Not Specified)   • PGMo  (Not Specified)   • PGFa  (Not Specified)       Patient Active Problem List    Diagnosis Date Noted   • Liver disease 01/23/2022   • Close exposure to COVID-19 virus 01/20/2022   • Severe recurrent major depression (HCC) 08/18/2021   • Chronic fatigue 10/01/2020   • Hypokalemia 09/02/2020   • Anemia 09/02/2020   • Psychiatric pseudoseizure 07/16/2020   • Lethargy 07/16/2020   • Intractable vomiting with nausea 07/16/2020   • Loss of sensation 07/16/2020   • Weakness 07/16/2020   • Migraine 07/16/2020   • Lumbar radiculopathy 09/13/2018   • Acute post-hemorrhagic anemia 09/10/2018          Objective:   Ht 1.549 m (5' 1\")   Wt 77.1 kg (170 lb)   LMP 12/26/2021   BMI 32.12 kg/m²     Physical Exam:  Constitutional: Alert, no distress, well-groomed.  Skin: No rashes in visible areas.  Eye: Round. Conjunctiva clear, lids normal. No icterus.   ENMT: Lips pink without lesions, good dentition, moist mucous membranes. Phonation normal.  Neck: No masses, no thyromegaly. Moves freely without pain.  Respiratory: Unlabored respiratory effort, no cough or audible wheeze  Psych: Alert and oriented x3, " normal affect and mood.       Assessment and Plan:   The following treatment plan was discussed:     1. Close exposure to COVID-19 virus    2. Chronic fatigue  New to me; Fatigue symptoms seem quite severe per patient.   Due for updated blood work to include evaluation of anemia, iron def as well as autoimmune/inflammatory conditions.   Once labs have been reviewed would prefer patient to be seen in clinic for further assessment and discussion - which she agrees to.   I have also ordered a pelvic US for further eval of heavy menses. We did discuss possible endometriosis given severe pain and heavy bleeding during menses? - unsure if this could be cause of severe fatigue.   - CBC WITH DIFFERENTIAL; Future  - TSH; Future  - FREE THYROXINE; Future  - T3 FREE; Future  - VITAMIN D,25 HYDROXY; Future  - HELIO ANTIBODY WITH REFLEX; Future  - Sed Rate; Future  - CRP QUANTITIVE (NON-CARDIAC); Future  - IRON/TOTAL IRON BIND; Future  - FERRITIN; Future    3. Hyponatremia  - Comp Metabolic Panel; Future    4. Anemia, unspecified type  - CBC WITH DIFFERENTIAL; Future  - IRON/TOTAL IRON BIND; Future  - FERRITIN; Future  - US-PELVIC COMPLETE (TRANSABDOMINAL/TRANSVAGINAL) (COMBO); Future    5. Menorrhagia with irregular cycle  - US-PELVIC COMPLETE (TRANSABDOMINAL/TRANSVAGINAL) (COMBO); Future    6. Liver disease  Follow up from CT abdomen.   - MR-ABDOMEN-WITH & W/O; Future    Other orders  - clonazePAM (KLONOPIN) 1 MG Tab        Follow-up: Return in about 4 weeks (around 2/17/2022), or if symptoms worsen or fail to improve.    SATNAM Caruso.-C.  Supervising MD: Dr. David Gonzalez MD  01/23/22

## 2022-01-24 NOTE — TELEPHONE ENCOUNTER
1. Caller Name: Sadia Troncoso                          Call Back Number: [phone] (home)         How would the patient prefer to be contacted with a response:     Pt has MRI scheduled for 2/4  Pt asking for a medication to help her relax during this procedure.

## 2022-01-25 ENCOUNTER — HOSPITAL ENCOUNTER (OUTPATIENT)
Dept: RADIOLOGY | Facility: MEDICAL CENTER | Age: 27
End: 2022-01-25
Attending: PHYSICIAN ASSISTANT
Payer: COMMERCIAL

## 2022-01-25 DIAGNOSIS — N92.1 MENORRHAGIA WITH IRREGULAR CYCLE: ICD-10-CM

## 2022-01-25 DIAGNOSIS — D64.9 ANEMIA, UNSPECIFIED TYPE: ICD-10-CM

## 2022-01-25 PROCEDURE — 76830 TRANSVAGINAL US NON-OB: CPT

## 2022-01-25 NOTE — TELEPHONE ENCOUNTER
Patient mentioned needing refill in the message    Received request via: Patient    Was the patient seen in the last year in this department? Yes  1/20/2022  Does the patient have an active prescription (recently filled or refills available) for medication(s) requested? No

## 2022-01-27 RX ORDER — CLONAZEPAM 1 MG/1
1 TABLET ORAL
Qty: 30 TABLET | Refills: 0 | OUTPATIENT
Start: 2022-01-27 | End: 2022-02-26

## 2022-01-28 ENCOUNTER — TELEMEDICINE (OUTPATIENT)
Dept: MEDICAL GROUP | Facility: LAB | Age: 27
End: 2022-01-28
Payer: COMMERCIAL

## 2022-01-28 ENCOUNTER — TELEPHONE (OUTPATIENT)
Dept: MEDICAL GROUP | Facility: LAB | Age: 27
End: 2022-01-28

## 2022-01-28 VITALS — HEIGHT: 61 IN | BODY MASS INDEX: 32.12 KG/M2

## 2022-01-28 DIAGNOSIS — U07.1 COVID-19: ICD-10-CM

## 2022-01-28 PROCEDURE — 99213 OFFICE O/P EST LOW 20 MIN: CPT | Performed by: FAMILY MEDICINE

## 2022-01-28 RX ORDER — CODEINE PHOSPHATE AND GUAIFENESIN 10; 100 MG/5ML; MG/5ML
5 SOLUTION ORAL EVERY 4 HOURS PRN
Qty: 180 ML | Refills: 0 | Status: SHIPPED | OUTPATIENT
Start: 2022-01-28 | End: 2022-02-02

## 2022-01-28 RX ORDER — PREDNISONE 20 MG/1
40 TABLET ORAL DAILY
Qty: 10 TABLET | Refills: 0 | Status: SHIPPED | OUTPATIENT
Start: 2022-01-28 | End: 2022-02-02

## 2022-01-28 NOTE — TELEPHONE ENCOUNTER
1. Caller Name: Sadia Troncoso                          Call Back Number: [phone] (home)         How would the patient prefer to be contacted with a response:     Pt still has a cough. She has been taking Robitussin and tylenol. She was asking if you can send her in some medication she feels she is not getting better.

## 2022-01-28 NOTE — PROGRESS NOTES
Virtual Visit: Established Patient   This visit was conducted via Zoom using secure and encrypted videoconferencing technology.   The patient was in a private location in the state of [place].    The patient's identity was confirmed and verbal consent was obtained for this virtual visit.    Subjective:   CC:   Chief Complaint   Patient presents with   • Coronavirus Screening       Sadia Troncoso is a 26 y.o. female presenting for evaluation and management of:   COVID positive at home yesterday, symptoms started 3 days ago. Kids had it last week. Some SOB but mostly with coughing. Phlegmy cough as well as dry.     No history of asthma. Has had inhaler at some point.   Doesn't have one right now.   Robitussin, mucinex cold and flu, sudafed, cough drops, tylenol and ibuprofen.   Is getting lots of fluids in.   Trying to stay isolated from household.   Coughing more with lying down.   Some chest tightness as well, especially with lying down and with coughing.         ROS   See above.     Current medicines (including changes today)  Current Outpatient Medications   Medication Sig Dispense Refill   • guaifenesin-codeine (ROBITUSSIN AC) Solution oral solution Take 5 mL by mouth every four hours as needed for Cough for up to 5 days. 180 mL 0   • predniSONE (DELTASONE) 20 MG Tab Take 2 Tablets by mouth every day for 5 days. 10 Tablet 0   • clonazePAM (KLONOPIN) 1 MG Tab        No current facility-administered medications for this visit.       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  Skin: No rashes in visible areas.  Eye: Round. Conjunctiva clear, lids normal. No icterus.   ENMT: Lips pink without lesions, good dentition, moist mucous membranes. Phonation normal.  Neck: No masses, no thyromegaly. Moves freely without pain.  Respiratory: Unlabored respiratory effort, no cough or audible wheeze  Psych: Alert and oriented x3, normal affect and mood.     Assessment and Plan:   The following treatment plan was discussed:     1. COVID-19  - guaifenesin-codeine (ROBITUSSIN AC) Solution oral solution; Take 5 mL by mouth every four hours as needed for Cough for up to 5 days.  Dispense: 180 mL; Refill: 0  - predniSONE (DELTASONE) 20 MG Tab; Take 2 Tablets by mouth every day for 5 days.  Dispense: 10 Tablet; Refill: 0  Discussed use of meds, not driving on cough syrup, and being seen urgently if SOB gets worse, or she is passing out, or unable to care for herself and her children.     Follow-up: No follow-ups on file.         "

## 2022-01-31 ENCOUNTER — HOSPITAL ENCOUNTER (EMERGENCY)
Facility: MEDICAL CENTER | Age: 27
End: 2022-01-31
Attending: EMERGENCY MEDICINE
Payer: COMMERCIAL

## 2022-01-31 ENCOUNTER — APPOINTMENT (OUTPATIENT)
Dept: RADIOLOGY | Facility: MEDICAL CENTER | Age: 27
End: 2022-01-31
Attending: EMERGENCY MEDICINE
Payer: COMMERCIAL

## 2022-01-31 VITALS
WEIGHT: 169.97 LBS | HEIGHT: 61 IN | BODY MASS INDEX: 32.09 KG/M2 | SYSTOLIC BLOOD PRESSURE: 100 MMHG | OXYGEN SATURATION: 95 % | RESPIRATION RATE: 16 BRPM | DIASTOLIC BLOOD PRESSURE: 74 MMHG | TEMPERATURE: 97.9 F | HEART RATE: 80 BPM

## 2022-01-31 DIAGNOSIS — U07.1 COVID: ICD-10-CM

## 2022-01-31 PROCEDURE — 700102 HCHG RX REV CODE 250 W/ 637 OVERRIDE(OP): Performed by: EMERGENCY MEDICINE

## 2022-01-31 PROCEDURE — 71045 X-RAY EXAM CHEST 1 VIEW: CPT

## 2022-01-31 PROCEDURE — 99284 EMERGENCY DEPT VISIT MOD MDM: CPT

## 2022-01-31 PROCEDURE — A9270 NON-COVERED ITEM OR SERVICE: HCPCS | Performed by: EMERGENCY MEDICINE

## 2022-01-31 RX ORDER — OXYCODONE HYDROCHLORIDE AND ACETAMINOPHEN 5; 325 MG/1; MG/1
1 TABLET ORAL EVERY 6 HOURS PRN
Qty: 15 TABLET | Refills: 0 | Status: SHIPPED | OUTPATIENT
Start: 2022-01-31 | End: 2022-02-03

## 2022-01-31 RX ORDER — OXYCODONE HYDROCHLORIDE AND ACETAMINOPHEN 5; 325 MG/1; MG/1
2 TABLET ORAL ONCE
Status: COMPLETED | OUTPATIENT
Start: 2022-01-31 | End: 2022-01-31

## 2022-01-31 RX ADMIN — OXYCODONE AND ACETAMINOPHEN 2 TABLET: 5; 325 TABLET ORAL at 15:27

## 2022-01-31 ASSESSMENT — FIBROSIS 4 INDEX: FIB4 SCORE: .4307692307692307692

## 2022-01-31 NOTE — ED NOTES
Patient state symptoms started 5 days ago. Positive for COVID 4 days ago at home test. Symptoms have just getting worse, patient states have been on steroids. Steroids started on Friday, tomorrow supposedly to be last day. SOB, cough, ears clogged, sinus pressure, sore throat, nausea, constipation, joint pain, pain everywhere.     Patient requesting something stronger for pain, will talk to erp.

## 2022-01-31 NOTE — ED NOTES
Reviewed discharge paperwork with patient, no questions at this time. Understands importance to  medication at pharmacy. Patient ambulated to husbands car, waiting at ER entrance.

## 2022-01-31 NOTE — ED PROVIDER NOTES
ED Provider Note    CHIEF COMPLAINT  Chief Complaint   Patient presents with   • Coronavirus Screening     States she was dx with covid on Thursday and prescibed medications. States she doesn't feel it is gettting any better       HPI  Sadia Troncoso is a 26 y.o. female who presents for evaluation of body aches fever cough not feeling well fatigue and myalgias.  The patient started having symptoms around 4 days ago.  She took a home positive Covid test.  She had a telemedicine visit late last week and the provider provided a prednisone prescription as well as some prescription cough syrup.  The patient has only had 1 banana vaccine around 3 weeks ago.  No report of any severe headache rash or myalgias.  Patient denies pregnancy    REVIEW OF SYSTEMS  See HPI for further details.  Positive for cough body aches fatigue all other systems are negative.     PAST MEDICAL HISTORY  Past Medical History:   Diagnosis Date   • Anxiety    • Depression    • Seizure disorder (HCC)        FAMILY HISTORY  Noncontributory    SOCIAL HISTORY  Social History     Socioeconomic History   • Marital status:      Spouse name: Not on file   • Number of children: 2   • Years of education: Not on file   • Highest education level: Not on file   Occupational History   • Occupation:      Employer: [employer]   Tobacco Use   • Smoking status: Former Smoker     Packs/day: 0.25     Years: 2.00     Pack years: 0.50     Types: Cigarettes   • Smokeless tobacco: Never Used   Vaping Use   • Vaping Use: Never used   Substance and Sexual Activity   • Alcohol use: Not Currently     Alcohol/week: 1.8 oz     Types: 3 Cans of beer per week     Comment: 3 alcoholic seltzers 1 day a week   • Drug use: Yes     Types: Inhaled     Comment: THC CBD   • Sexual activity: Not on file   Other Topics Concern   • Not on file   Social History Narrative   • Not on file     Social Determinants of Health     Financial Resource Strain:    •  "Difficulty of Paying Living Expenses: Not on file   Food Insecurity:    • Worried About Running Out of Food in the Last Year: Not on file   • Ran Out of Food in the Last Year: Not on file   Transportation Needs:    • Lack of Transportation (Medical): Not on file   • Lack of Transportation (Non-Medical): Not on file   Physical Activity:    • Days of Exercise per Week: Not on file   • Minutes of Exercise per Session: Not on file   Stress:    • Feeling of Stress : Not on file   Social Connections:    • Frequency of Communication with Friends and Family: Not on file   • Frequency of Social Gatherings with Friends and Family: Not on file   • Attends [employer] Services: Not on file   • Active Member of Clubs or Organizations: Not on file   • Attends Club or Organization Meetings: Not on file   • Marital Status: Not on file   Intimate Partner Violence:    • Fear of Current or Ex-Partner: Not on file   • Emotionally Abused: Not on file   • Physically Abused: Not on file   • Sexually Abused: Not on file   Housing Stability:    • Unable to Pay for Housing in the Last Year: Not on file   • Number of Places Lived in the Last Year: Not on file   • Unstable Housing in the Last Year: Not on file     Denies IV drug  SURGICAL HISTORY  Past Surgical History:   Procedure Laterality Date   • APPENDECTOMY         CURRENT MEDICATIONS  Home Medications     Reviewed by Roman Rosales R.N. (Registered Nurse) on 01/31/22 at 1427  Med List Status: Not Addressed   Medication Last Dose Status   clonazePAM (KLONOPIN) 1 MG Tab  Active   guaifenesin-codeine (ROBITUSSIN AC) Solution oral solution  Active   predniSONE (DELTASONE) 20 MG Tab  Active                ALLERGIES  No Known Allergies    PHYSICAL EXAM  VITAL SIGNS: /73   Pulse 94   Temp 36.6 °C (97.8 °F) (Temporal)   Resp 18   Ht 1.549 m (5' 1\")   Wt 77.1 kg (169 lb 15.6 oz)   SpO2 96%   BMI 32.12 kg/m²       Constitutional: Patient appears mildly ill  HENT: Normocephalic, " Atraumatic, Bilateral external ears normal, Oropharynx moist, No oral exudates, Nose normal.   Eyes: PERRLA, EOMI, Conjunctiva normal, No discharge.   Neck: Normal range of motion, No tenderness, Supple, No stridor.   Cardiovascular: Normal heart rate, Normal rhythm, No murmurs, No rubs, No gallops.   Thorax & Lungs: Bibasilar rhonchi , No chest tenderness.   Abdomen: Bowel sounds normal, Soft, No tenderness, No masses, No pulsatile masses.   Skin: Warm, Dry, No erythema, No rash.   Back: No tenderness, No CVA tenderness.   Extremities: Intact distal pulses, No edema, No tenderness, No cyanosis, No clubbing.   Neurologic: Alert & oriented x 3, Normal motor function, Normal sensory function, No focal deficits noted.   Psychiatric: Anxious    DX-CHEST-PORTABLE (1 VIEW)   Final Result      No radiographic evidence of acute cardiopulmonary process.            COURSE & MEDICAL DECISION MAKING  Pertinent Labs & Imaging studies reviewed. (See chart for details)  Patient continues to have viral syndrome including body aches cough myalgias.  She has no high fever or hypoxia.  Chest x-ray is clear.  She does not meet any criteria for emergency use authorization of oral antivirals or monoclonal antibody therapy.  She continues to have cough I think I can send her home with a small prescription of Percocet for some breakthrough pain.  She is already on steroids and prescribed cough syrup.  I reviewed her profile in the  website.  She has not had any clear history of opioid misuse or addiction.  She will be consented for brief opioid therapy    FINAL IMPRESSION  1.  COVID-19 without complication    Electronically signed by: Jono Narayan M.D., 1/31/2022 2:59 PM

## 2022-02-04 ENCOUNTER — APPOINTMENT (OUTPATIENT)
Dept: RADIOLOGY | Facility: MEDICAL CENTER | Age: 27
End: 2022-02-04
Attending: PHYSICIAN ASSISTANT
Payer: COMMERCIAL

## 2022-02-04 ENCOUNTER — HOSPITAL ENCOUNTER (EMERGENCY)
Facility: MEDICAL CENTER | Age: 27
End: 2022-02-05
Attending: EMERGENCY MEDICINE
Payer: COMMERCIAL

## 2022-02-04 DIAGNOSIS — S09.90XA CLOSED HEAD INJURY, INITIAL ENCOUNTER: ICD-10-CM

## 2022-02-04 DIAGNOSIS — U07.1 LAB TEST POSITIVE FOR DETECTION OF COVID-19 VIRUS: ICD-10-CM
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
## 2022-02-04 DIAGNOSIS — S01.01XA SCALP LACERATION, INITIAL ENCOUNTER: ICD-10-CM

## 2022-02-04 DIAGNOSIS — R55 SYNCOPE, UNSPECIFIED SYNCOPE TYPE: ICD-10-CM

## 2022-02-04 PROCEDURE — 93005 ELECTROCARDIOGRAM TRACING: CPT | Performed by: EMERGENCY MEDICINE

## 2022-02-04 PROCEDURE — 99285 EMERGENCY DEPT VISIT HI MDM: CPT

## 2022-02-04 RX ORDER — ACETAMINOPHEN 500 MG
1000 TABLET ORAL ONCE
Status: COMPLETED | OUTPATIENT
Start: 2022-02-05 | End: 2022-02-05

## 2022-02-04 ASSESSMENT — FIBROSIS 4 INDEX: FIB4 SCORE: .4307692307692307692

## 2022-02-05 ENCOUNTER — APPOINTMENT (OUTPATIENT)
Dept: RADIOLOGY | Facility: MEDICAL CENTER | Age: 27
End: 2022-02-05
Attending: EMERGENCY MEDICINE
Payer: COMMERCIAL

## 2022-02-05 VITALS
DIASTOLIC BLOOD PRESSURE: 73 MMHG | HEART RATE: 74 BPM | SYSTOLIC BLOOD PRESSURE: 104 MMHG | OXYGEN SATURATION: 97 % | RESPIRATION RATE: 18 BRPM | WEIGHT: 169.97 LBS | HEIGHT: 61 IN | BODY MASS INDEX: 32.09 KG/M2 | TEMPERATURE: 97.5 F

## 2022-02-05 LAB
ALBUMIN SERPL BCP-MCNC: 4.6 G/DL (ref 3.2–4.9)
ALBUMIN/GLOB SERPL: 1.4 G/DL
ALP SERPL-CCNC: 81 U/L (ref 30–99)
ALT SERPL-CCNC: 45 U/L (ref 2–50)
ANION GAP SERPL CALC-SCNC: 14 MMOL/L (ref 7–16)
AST SERPL-CCNC: 30 U/L (ref 12–45)
BASOPHILS # BLD AUTO: 0.7 % (ref 0–1.8)
BASOPHILS # BLD: 0.05 K/UL (ref 0–0.12)
BILIRUB SERPL-MCNC: 0.3 MG/DL (ref 0.1–1.5)
BUN SERPL-MCNC: 12 MG/DL (ref 8–22)
CALCIUM SERPL-MCNC: 9.3 MG/DL (ref 8.4–10.2)
CHLORIDE SERPL-SCNC: 99 MMOL/L (ref 96–112)
CO2 SERPL-SCNC: 26 MMOL/L (ref 20–33)
CREAT SERPL-MCNC: 0.84 MG/DL (ref 0.5–1.4)
EKG IMPRESSION: NORMAL
EOSINOPHIL # BLD AUTO: 0.13 K/UL (ref 0–0.51)
EOSINOPHIL NFR BLD: 1.8 % (ref 0–6.9)
ERYTHROCYTE [DISTWIDTH] IN BLOOD BY AUTOMATED COUNT: 42.5 FL (ref 35.9–50)
GLOBULIN SER CALC-MCNC: 3.4 G/DL (ref 1.9–3.5)
GLUCOSE SERPL-MCNC: 97 MG/DL (ref 65–99)
HCG SERPL QL: NEGATIVE
HCT VFR BLD AUTO: 41.2 % (ref 37–47)
HGB BLD-MCNC: 13 G/DL (ref 12–16)
IMM GRANULOCYTES # BLD AUTO: 0.02 K/UL (ref 0–0.11)
IMM GRANULOCYTES NFR BLD AUTO: 0.3 % (ref 0–0.9)
LYMPHOCYTES # BLD AUTO: 3.01 K/UL (ref 1–4.8)
LYMPHOCYTES NFR BLD: 42.2 % (ref 22–41)
MCH RBC QN AUTO: 26.7 PG (ref 27–33)
MCHC RBC AUTO-ENTMCNC: 31.6 G/DL (ref 33.6–35)
MCV RBC AUTO: 84.6 FL (ref 81.4–97.8)
MONOCYTES # BLD AUTO: 0.4 K/UL (ref 0–0.85)
MONOCYTES NFR BLD AUTO: 5.6 % (ref 0–13.4)
NEUTROPHILS # BLD AUTO: 3.52 K/UL (ref 2–7.15)
NEUTROPHILS NFR BLD: 49.4 % (ref 44–72)
NRBC # BLD AUTO: 0 K/UL
NRBC BLD-RTO: 0 /100 WBC
PLATELET # BLD AUTO: 403 K/UL (ref 164–446)
PMV BLD AUTO: 10.1 FL (ref 9–12.9)
POTASSIUM SERPL-SCNC: 4.6 MMOL/L (ref 3.6–5.5)
PROT SERPL-MCNC: 8 G/DL (ref 6–8.2)
RBC # BLD AUTO: 4.87 M/UL (ref 4.2–5.4)
SODIUM SERPL-SCNC: 139 MMOL/L (ref 135–145)
TROPONIN T SERPL-MCNC: <6 NG/L (ref 6–19)
WBC # BLD AUTO: 7.1 K/UL (ref 4.8–10.8)

## 2022-02-05 PROCEDURE — 80053 COMPREHEN METABOLIC PANEL: CPT

## 2022-02-05 PROCEDURE — 84703 CHORIONIC GONADOTROPIN ASSAY: CPT

## 2022-02-05 PROCEDURE — A9270 NON-COVERED ITEM OR SERVICE: HCPCS | Performed by: EMERGENCY MEDICINE

## 2022-02-05 PROCEDURE — 304217 HCHG IRRIGATION SYSTEM

## 2022-02-05 PROCEDURE — 305308 HCHG STAPLER,SKIN,DISP.

## 2022-02-05 PROCEDURE — 700111 HCHG RX REV CODE 636 W/ 250 OVERRIDE (IP): Performed by: EMERGENCY MEDICINE

## 2022-02-05 PROCEDURE — 96374 THER/PROPH/DIAG INJ IV PUSH: CPT

## 2022-02-05 PROCEDURE — 84484 ASSAY OF TROPONIN QUANT: CPT

## 2022-02-05 PROCEDURE — 700102 HCHG RX REV CODE 250 W/ 637 OVERRIDE(OP): Performed by: EMERGENCY MEDICINE

## 2022-02-05 PROCEDURE — 304999 HCHG REPAIR-SIMPLE/INTERMED LEVEL 1

## 2022-02-05 PROCEDURE — 70450 CT HEAD/BRAIN W/O DYE: CPT

## 2022-02-05 PROCEDURE — 85025 COMPLETE CBC W/AUTO DIFF WBC: CPT

## 2022-02-05 RX ORDER — ONDANSETRON 2 MG/ML
4 INJECTION INTRAMUSCULAR; INTRAVENOUS ONCE
Status: COMPLETED | OUTPATIENT
Start: 2022-02-05 | End: 2022-02-05

## 2022-02-05 RX ORDER — ONDANSETRON 4 MG/1
4 TABLET, ORALLY DISINTEGRATING ORAL ONCE
Status: DISCONTINUED | OUTPATIENT
Start: 2022-02-05 | End: 2022-02-05

## 2022-02-05 RX ADMIN — ONDANSETRON 4 MG: 2 INJECTION INTRAMUSCULAR; INTRAVENOUS at 01:04

## 2022-02-05 RX ADMIN — ACETAMINOPHEN 1000 MG: 500 TABLET, FILM COATED ORAL at 00:18

## 2022-02-05 RX ADMIN — IBUPROFEN 800 MG: 600 TABLET ORAL at 00:18

## 2022-02-05 NOTE — DISCHARGE INSTRUCTIONS
Please keep the laceration covered for the first 24 hours to allow to heal. After that you can wash gently with warm soapy water. The staple should come out in 10-14 days  and you can see your PCP, an urgent care or return to the emergency department for this. Signs of infection include pus, swelling, warmth or redness from the wound and if any of these things occur or you develop a fever, you should return the ED for further evaluation and treatment. Thank you for coming in today.

## 2022-02-05 NOTE — ED PROVIDER NOTES
ED Provider Note  ED Provider Note    Scribed for David Kilpatrick by David Kilpatrick. 2/4/2022  11:58 PM    Primary care provider: Joan King P.A.-C.  Means of arrival: Private vehicle  History obtained from: Patient  History limited by: None    CHIEF COMPLAINT  Chief Complaint   Patient presents with   • T-5000 GLF     Pt c/o laceration to head; Pt stated that she was laying and got up, felt dizzy and passed out - Pt doesn't remember passing out; Pt also stated that she is COVID +; -* blood thinner; - N/V;   • Syncope       HPI  Sadia Troncoso is a 26 y.o. female who presents to the Emergency Department who was recently diagnosed with COVID-19, she is on day 7 of her diagnosis.  She has had persistent symptoms.  Tonight she got up from bed very quickly, felt lightheaded, passed out and hit her head on the coffee table and then hit the floor.  She does not think she was out for more than a few seconds but she had significant blood loss she stated from a scalp laceration on the right head.  This happened approximately an hour or 2 prior to arrival to the emergency department.  She has had no nausea or vomiting.  She says that she has a remote history of seizure disorder but she has been free of medications for the past 10 months and seizure-free as well.  Does not think she is pregnant.  Denies any presyncope symptoms such as chest pain, shortness of breath, palpitations.  Denies a history of syncope.    REVIEW OF SYSTEMS  As above, all other systems reviewed and are negative.   See HPI for further details.     PAST MEDICAL HISTORY   has a past medical history of Anxiety, Depression, and Seizure disorder (HCC).  SURGICAL HISTORY   has a past surgical history that includes appendectomy.  SOCIAL HISTORY  Social History     Tobacco Use   • Smoking status: Former Smoker     Packs/day: 0.25     Years: 2.00     Pack years: 0.50     Types: Cigarettes   • Smokeless tobacco: Never Used   Vaping Use   •  "Vaping Use: Never used   Substance Use Topics   • Alcohol use: Not Currently     Alcohol/week: 1.8 oz     Types: 3 Cans of beer per week     Comment: 3 alcoholic seltzers 1 day a week   • Drug use: Yes     Types: Inhaled     Comment: THC CBD      Social History     Substance and Sexual Activity   Drug Use Yes   • Types: Inhaled    Comment: THC CBD     FAMILY HISTORY  Family History   Problem Relation Age of Onset   • Seizures Maternal Grandmother    • Seizures Maternal Grandfather    • Seizures Paternal Grandmother    • Seizures Paternal Grandfather      CURRENT MEDICATIONS  Home Medications     Reviewed by David Barney R.N. (Registered Nurse) on 02/04/22 at 2356  Med List Status: Partial   Medication Last Dose Status   clonazePAM (KLONOPIN) 1 MG Tab  Active              ALLERGIES  No Known Allergies  PHYSICAL EXAM  VITAL SIGNS:   Vitals:    02/04/22 2307   BP: 106/69   Pulse: (!) 114   Resp: 16   Temp: 36.2 °C (97.1 °F)   TempSrc: Oral   SpO2: 98%   Weight: 77.1 kg (169 lb 15.6 oz)   Height: 1.549 m (5' 1\")       Vitals: My interpretation: normotensive, tachycardic, afebrile, not hypoxic    Reinterpretation of vitals: Unchanged    PE:   Gen: sitting comfortably, speaking clearly, appears in no acute distress   ENT: Mucous membranes moist, posterior pharynx clear, uvula midline, nares patent bilaterally   Neck: Supple, FROM  Head: Small 1 cm scalp laceration to the right parietal scalp, hemostatic,  Pulmonary: Lungs are clear to auscultation bilaterally. No tachypnea  CV:  RRR, no murmur appreciated, pulses 2+ in both upper and lower extremities  Abdomen: soft, NT/ND; no rebound/guarding  : no CVA or suprapubic tenderness   Neuro: A&Ox4 (person, place, time, situation), speech fluent, gait steady, no focal deficits appreciated  Skin: No rash or lesions.  No pallor or jaundice.  No cyanosis.  Warm and dry.     DIAGNOSTIC STUDIES / PROCEDURES    LABS  Results for orders placed or performed during the hospital " encounter of 02/04/22   CBC WITH DIFFERENTIAL   Result Value Ref Range    WBC 7.1 4.8 - 10.8 K/uL    RBC 4.87 4.20 - 5.40 M/uL    Hemoglobin 13.0 12.0 - 16.0 g/dL    Hematocrit 41.2 37.0 - 47.0 %    MCV 84.6 81.4 - 97.8 fL    MCH 26.7 (L) 27.0 - 33.0 pg    MCHC 31.6 (L) 33.6 - 35.0 g/dL    RDW 42.5 35.9 - 50.0 fL    Platelet Count 403 164 - 446 K/uL    MPV 10.1 9.0 - 12.9 fL    Neutrophils-Polys 49.40 44.00 - 72.00 %    Lymphocytes 42.20 (H) 22.00 - 41.00 %    Monocytes 5.60 0.00 - 13.40 %    Eosinophils 1.80 0.00 - 6.90 %    Basophils 0.70 0.00 - 1.80 %    Immature Granulocytes 0.30 0.00 - 0.90 %    Nucleated RBC 0.00 /100 WBC    Neutrophils (Absolute) 3.52 2.00 - 7.15 K/uL    Lymphs (Absolute) 3.01 1.00 - 4.80 K/uL    Monos (Absolute) 0.40 0.00 - 0.85 K/uL    Eos (Absolute) 0.13 0.00 - 0.51 K/uL    Baso (Absolute) 0.05 0.00 - 0.12 K/uL    Immature Granulocytes (abs) 0.02 0.00 - 0.11 K/uL    NRBC (Absolute) 0.00 K/uL   COMP METABOLIC PANEL   Result Value Ref Range    Sodium 139 135 - 145 mmol/L    Potassium 4.6 3.6 - 5.5 mmol/L    Chloride 99 96 - 112 mmol/L    Co2 26 20 - 33 mmol/L    Anion Gap 14.0 7.0 - 16.0    Glucose 97 65 - 99 mg/dL    Bun 12 8 - 22 mg/dL    Creatinine 0.84 0.50 - 1.40 mg/dL    Calcium 9.3 8.4 - 10.2 mg/dL    AST(SGOT) 30 12 - 45 U/L    ALT(SGPT) 45 2 - 50 U/L    Alkaline Phosphatase 81 30 - 99 U/L    Total Bilirubin 0.3 0.1 - 1.5 mg/dL    Albumin 4.6 3.2 - 4.9 g/dL    Total Protein 8.0 6.0 - 8.2 g/dL    Globulin 3.4 1.9 - 3.5 g/dL    A-G Ratio 1.4 g/dL   BETA-HCG QUALITATIVE SERUM   Result Value Ref Range    Beta-Hcg Qualitative Serum Negative Negative   TROPONIN   Result Value Ref Range    Troponin T <6 6 - 19 ng/L   ESTIMATED GFR   Result Value Ref Range    GFR If African American >60 >60 mL/min/1.73 m 2    GFR If Non African American >60 >60 mL/min/1.73 m 2   EKG   Result Value Ref Range    Report       [facility] Emergency Dept.    Test Date:  2022-02-05  Pt  Name:    JONATHAN DICKERSON                 Department: [facility]  MRN:        [id number]                      Room:       [facility]  Gender:     Female                       Technician: KAITLYNN  :        1995                   Requested By:CLAYTON ROMERO  Order #:    [id number]                    Reading MD: Clayton Romero    Measurements  Intervals                                Axis  Rate:       73                           P:          33  HI:         159                          QRS:        48  QRSD:       91                           T:          41  QT:         393  QTc:        433    Interpretive Statements  Sinus rhythm  Compared to ECG 2021 14:38:46  No significant changes  Electronically Signed On 2022 1:49:01 PST by Clayton Romero        All labs reviewed by me. Significant for no axis, no anemia, normal electrolytes, normal renal function, normal liver enzymes, normal bilirubin, troponin negative    RADIOLOGY  CT-HEAD W/O   Final Result         1.  No acute intracranial abnormality.           The radiologist's interpretation of all radiological studies have been reviewed by me.    COURSE & MEDICAL DECISION MAKING  Nursing notes, VS, PMSFHx, labs, imaging, EKG reviewed in chart.    Heart Score: Low risk    MDM: 11:58 PM Jonathan Dickerson is a 26 y.o. female who presented with small scalp laceration after syncopal event.  Patient is Covid positive it is felt rundown recently from her illness.  Set up to quickly from bed, felt lightheaded, passed out and hit her head on coffee table.  No nausea or vomiting since the event.  Small laceration, 1 cm on the scalp which was repaired with a single staple after thorough irrigation, see procedure note.  She had basics labs were run including CBC, CMP, troponin and EKG which were all unremarkable.  Her [place] syncope score is low risk.  EKG unremarkable.  Recommended return precautions, staple removal in 10 days and outpatient follow-up as needed.   Patient verbalized understanding plan.  She is given Tylenol Motrin for discomfort in the ED. while in the emergency department she did develop some nausea and vomiting and still had pounding headache, so CT head was done which was negative for any intracranial abnormality.  She is ambulatory, discharge, no acute distress and verbalized understanding strict return precautions.    Laceration Repair Procedure Note    Indication: Laceration    Procedure: The patient was placed in the appropriate position and anesthesia around the laceration was obtained by infiltration using None. The area was then irrigated with normal saline and the skin adjacent to the wound was cleansed with Betadine. The laceration was closed with staples. A total of 1 staples were placed. The wound area was then dressed with a sterile dressing.      Total repaired wound length: 1 cm.     FINAL IMPRESSION  1. Closed head injury, initial encounter Acute   2. Fall, initial encounter Acute   3. Syncope, unspecified syncope type Acute   4. Scalp laceration, initial encounter Acute   5. Lab test positive for detection of COVID-19 virus Acute      The note accurately reflects work and decisions made by me.  David Kilpatrick  2/4/2022  11:58 PM

## 2022-02-08 ENCOUNTER — APPOINTMENT (OUTPATIENT)
Dept: RADIOLOGY | Facility: MEDICAL CENTER | Age: 27
End: 2022-02-08
Payer: COMMERCIAL

## 2022-02-08 ENCOUNTER — HOSPITAL ENCOUNTER (EMERGENCY)
Facility: MEDICAL CENTER | Age: 27
End: 2022-02-08
Attending: STUDENT IN AN ORGANIZED HEALTH CARE EDUCATION/TRAINING PROGRAM
Payer: COMMERCIAL

## 2022-02-08 VITALS
DIASTOLIC BLOOD PRESSURE: 59 MMHG | BODY MASS INDEX: 32.09 KG/M2 | TEMPERATURE: 98.7 F | WEIGHT: 169.97 LBS | HEIGHT: 61 IN | SYSTOLIC BLOOD PRESSURE: 101 MMHG | RESPIRATION RATE: 18 BRPM | HEART RATE: 90 BPM | OXYGEN SATURATION: 98 %

## 2022-02-08 DIAGNOSIS — S01.01XA LACERATION OF SCALP, INITIAL ENCOUNTER: Primary | ICD-10-CM

## 2022-02-08 DIAGNOSIS — S06.0X1A CONCUSSION WITH LOSS OF CONSCIOUSNESS OF 30 MINUTES OR LESS, INITIAL ENCOUNTER: ICD-10-CM

## 2022-02-08 LAB
ANION GAP SERPL CALC-SCNC: 11 MMOL/L (ref 7–16)
BASOPHILS # BLD AUTO: 0.5 % (ref 0–1.8)
BASOPHILS # BLD: 0.04 K/UL (ref 0–0.12)
BUN SERPL-MCNC: 11 MG/DL (ref 8–22)
CALCIUM SERPL-MCNC: 9.3 MG/DL (ref 8.4–10.2)
CHLORIDE SERPL-SCNC: 107 MMOL/L (ref 96–112)
CO2 SERPL-SCNC: 20 MMOL/L (ref 20–33)
CREAT SERPL-MCNC: 0.57 MG/DL (ref 0.5–1.4)
EKG IMPRESSION: NORMAL
EOSINOPHIL # BLD AUTO: 0.1 K/UL (ref 0–0.51)
EOSINOPHIL NFR BLD: 1.3 % (ref 0–6.9)
ERYTHROCYTE [DISTWIDTH] IN BLOOD BY AUTOMATED COUNT: 43.7 FL (ref 35.9–50)
GLUCOSE SERPL-MCNC: 119 MG/DL (ref 65–99)
HCG SERPL QL: NEGATIVE
HCT VFR BLD AUTO: 37.7 % (ref 37–47)
HGB BLD-MCNC: 12.1 G/DL (ref 12–16)
IMM GRANULOCYTES # BLD AUTO: 0.02 K/UL (ref 0–0.11)
IMM GRANULOCYTES NFR BLD AUTO: 0.3 % (ref 0–0.9)
LIPASE SERPL-CCNC: 37 U/L (ref 7–58)
LYMPHOCYTES # BLD AUTO: 3.14 K/UL (ref 1–4.8)
LYMPHOCYTES NFR BLD: 40.7 % (ref 22–41)
MCH RBC QN AUTO: 26.9 PG (ref 27–33)
MCHC RBC AUTO-ENTMCNC: 32.1 G/DL (ref 33.6–35)
MCV RBC AUTO: 84 FL (ref 81.4–97.8)
MONOCYTES # BLD AUTO: 0.36 K/UL (ref 0–0.85)
MONOCYTES NFR BLD AUTO: 4.7 % (ref 0–13.4)
NEUTROPHILS # BLD AUTO: 4.05 K/UL (ref 2–7.15)
NEUTROPHILS NFR BLD: 52.5 % (ref 44–72)
NRBC # BLD AUTO: 0 K/UL
NRBC BLD-RTO: 0 /100 WBC
PLATELET # BLD AUTO: 374 K/UL (ref 164–446)
PMV BLD AUTO: 10.2 FL (ref 9–12.9)
POTASSIUM SERPL-SCNC: 3.6 MMOL/L (ref 3.6–5.5)
RBC # BLD AUTO: 4.49 M/UL (ref 4.2–5.4)
SODIUM SERPL-SCNC: 138 MMOL/L (ref 135–145)
WBC # BLD AUTO: 7.7 K/UL (ref 4.8–10.8)

## 2022-02-08 PROCEDURE — 83690 ASSAY OF LIPASE: CPT

## 2022-02-08 PROCEDURE — 305308 HCHG STAPLER,SKIN,DISP.

## 2022-02-08 PROCEDURE — 99284 EMERGENCY DEPT VISIT MOD MDM: CPT

## 2022-02-08 PROCEDURE — 700105 HCHG RX REV CODE 258: Performed by: STUDENT IN AN ORGANIZED HEALTH CARE EDUCATION/TRAINING PROGRAM

## 2022-02-08 PROCEDURE — 304999 HCHG REPAIR-SIMPLE/INTERMED LEVEL 1

## 2022-02-08 PROCEDURE — 93005 ELECTROCARDIOGRAM TRACING: CPT

## 2022-02-08 PROCEDURE — 80048 BASIC METABOLIC PNL TOTAL CA: CPT

## 2022-02-08 PROCEDURE — 700101 HCHG RX REV CODE 250: Performed by: STUDENT IN AN ORGANIZED HEALTH CARE EDUCATION/TRAINING PROGRAM

## 2022-02-08 PROCEDURE — 85025 COMPLETE CBC W/AUTO DIFF WBC: CPT

## 2022-02-08 PROCEDURE — 70450 CT HEAD/BRAIN W/O DYE: CPT

## 2022-02-08 PROCEDURE — 700111 HCHG RX REV CODE 636 W/ 250 OVERRIDE (IP): Performed by: STUDENT IN AN ORGANIZED HEALTH CARE EDUCATION/TRAINING PROGRAM

## 2022-02-08 PROCEDURE — 84703 CHORIONIC GONADOTROPIN ASSAY: CPT

## 2022-02-08 PROCEDURE — 96374 THER/PROPH/DIAG INJ IV PUSH: CPT | Mod: XU

## 2022-02-08 PROCEDURE — 304217 HCHG IRRIGATION SYSTEM

## 2022-02-08 PROCEDURE — 96375 TX/PRO/DX INJ NEW DRUG ADDON: CPT | Mod: XU

## 2022-02-08 PROCEDURE — 93005 ELECTROCARDIOGRAM TRACING: CPT | Performed by: STUDENT IN AN ORGANIZED HEALTH CARE EDUCATION/TRAINING PROGRAM

## 2022-02-08 RX ORDER — SODIUM CHLORIDE 9 MG/ML
1000 INJECTION, SOLUTION INTRAVENOUS ONCE
Status: COMPLETED | OUTPATIENT
Start: 2022-02-08 | End: 2022-02-08

## 2022-02-08 RX ORDER — ONDANSETRON 2 MG/ML
8 INJECTION INTRAMUSCULAR; INTRAVENOUS ONCE
Status: COMPLETED | OUTPATIENT
Start: 2022-02-08 | End: 2022-02-08

## 2022-02-08 RX ORDER — PROCHLORPERAZINE EDISYLATE 5 MG/ML
10 INJECTION INTRAMUSCULAR; INTRAVENOUS ONCE
Status: COMPLETED | OUTPATIENT
Start: 2022-02-08 | End: 2022-02-08

## 2022-02-08 RX ORDER — GINSENG 100 MG
1 CAPSULE ORAL 2 TIMES DAILY
Qty: 1 PACKET | Refills: 0 | Status: SHIPPED | OUTPATIENT
Start: 2022-02-08 | End: 2022-02-15

## 2022-02-08 RX ORDER — KETOROLAC TROMETHAMINE 30 MG/ML
15 INJECTION, SOLUTION INTRAMUSCULAR; INTRAVENOUS ONCE
Status: COMPLETED | OUTPATIENT
Start: 2022-02-08 | End: 2022-02-08

## 2022-02-08 RX ADMIN — SODIUM CHLORIDE 1000 ML: 9 INJECTION, SOLUTION INTRAVENOUS at 02:30

## 2022-02-08 RX ADMIN — ONDANSETRON 8 MG: 2 INJECTION INTRAMUSCULAR; INTRAVENOUS at 02:17

## 2022-02-08 RX ADMIN — PROCHLORPERAZINE EDISYLATE 10 MG: 5 INJECTION INTRAMUSCULAR; INTRAVENOUS at 04:30

## 2022-02-08 RX ADMIN — KETOROLAC TROMETHAMINE 15 MG: 30 INJECTION, SOLUTION INTRAMUSCULAR at 05:14

## 2022-02-08 RX ADMIN — LIDOCAINE HYDROCHLORIDE 10 ML: 10 INJECTION, SOLUTION INFILTRATION; PERINEURAL at 05:15

## 2022-02-08 RX ADMIN — FENTANYL CITRATE 50 MCG: 50 INJECTION, SOLUTION INTRAMUSCULAR; INTRAVENOUS at 03:42

## 2022-02-08 ASSESSMENT — ENCOUNTER SYMPTOMS
HEADACHES: 1
FALLS: 1
CHILLS: 0
FEVER: 0
SORE THROAT: 0
BLURRED VISION: 0
LOSS OF CONSCIOUSNESS: 1
NAUSEA: 1
DOUBLE VISION: 0
ABDOMINAL PAIN: 0
SHORTNESS OF BREATH: 0
VOMITING: 1
COUGH: 0
NECK PAIN: 1

## 2022-02-08 ASSESSMENT — PAIN DESCRIPTION - PAIN TYPE
TYPE: ACUTE PAIN

## 2022-02-08 ASSESSMENT — FIBROSIS 4 INDEX: FIB4 SCORE: 0.31

## 2022-02-08 NOTE — ED PROVIDER NOTES
ED Provider Note    Chief Complaint:   Head trauma    HPI:  Sadia Troncoso is a very pleasant 26-year-old female with past medical history of anxiety, depression, seizures, insomnia recent COVID-19 infection who presents after being found down with head trauma and bleeding.  Patient presented similarly 4 days ago and was discharged after receiving staples on his scalp laceration.  Patient reports increasing her dose of clonazepam due to difficulty sleeping after COVID-19 infection.  Patient unremarkable work-up at that time.  Patient reports she woke up on the ground in her bathroom with blood on the floor.  Patient does not remember the episode.  Patient endorses 3 episodes of vomiting with nausea.  Patient endorses head throbbing and dizziness.    Review of Systems:  Review of Systems   Constitutional: Negative for chills and fever.   HENT: Negative for congestion and sore throat.    Eyes: Negative for blurred vision and double vision.   Respiratory: Negative for cough and shortness of breath.    Cardiovascular: Negative for chest pain and leg swelling.   Gastrointestinal: Positive for nausea and vomiting. Negative for abdominal pain.   Genitourinary: Negative for dysuria and hematuria.   Musculoskeletal: Positive for falls and neck pain.   Skin: Negative for itching and rash.   Neurological: Positive for loss of consciousness and headaches.       Past Medical History:   has a past medical history of Anxiety, Depression, and Seizure disorder (HCC).    Social History:  Social History     Tobacco Use   • Smoking status: Former Smoker     Packs/day: 0.25     Years: 2.00     Pack years: 0.50     Types: Cigarettes   • Smokeless tobacco: Never Used   Vaping Use   • Vaping Use: Never used   Substance and Sexual Activity   • Alcohol use: Not Currently     Alcohol/week: 1.8 oz     Types: 3 Cans of beer per week     Comment: 3 alcoholic seltzers 1 day a week   • Drug use: Yes     Types: Inhaled     Comment: THC CBD   •  "Sexual activity: Not on file       Surgical History:   has a past surgical history that includes appendectomy.    Allergies:  No Known Allergies    Physical Exam:  Vital Signs: /59   Pulse 88   Temp 37.1 °C (98.8 °F) (Temporal)   Resp 18   Ht 1.549 m (5' 1\")   Wt 77.1 kg (169 lb 15.6 oz)   SpO2 98%   BMI 32.12 kg/m²   Physical Exam  Vitals and nursing note reviewed.   Constitutional:       Comments: Patient is sitting up in bed, actively vomiting, uncomfortable appearing   HENT:      Head: Normocephalic and atraumatic.   Eyes:      Extraocular Movements: Extraocular movements intact.      Conjunctiva/sclera: Conjunctivae normal.      Pupils: Pupils are equal, round, and reactive to light.   Neck:      Comments: Midline C-spine tenderness to palpation is present without step-offs or deformities  Cardiovascular:      Pulses: Normal pulses.      Comments: HR 98  Pulmonary:      Effort: Pulmonary effort is normal. No respiratory distress.   Abdominal:      Comments: Abdomen is soft, non-tender, non-distended, non-rigid, no rebound, guarding, masses   Musculoskeletal:         General: No swelling. Normal range of motion.      Cervical back: Normal range of motion. No rigidity.   Skin:     General: Skin is warm and dry.      Capillary Refill: Capillary refill takes less than 2 seconds.   Neurological:      Mental Status: She is alert.      Comments: A&O x3, moving all extremities, sensation to light touch is grossly intact in the bilateral upper and lower extremities         Medical records reviewed for continuity of care.     Results for orders placed or performed during the hospital encounter of 02/08/22   CBC WITH DIFFERENTIAL   Result Value Ref Range    WBC 7.7 4.8 - 10.8 K/uL    RBC 4.49 4.20 - 5.40 M/uL    Hemoglobin 12.1 12.0 - 16.0 g/dL    Hematocrit 37.7 37.0 - 47.0 %    MCV 84.0 81.4 - 97.8 fL    MCH 26.9 (L) 27.0 - 33.0 pg    MCHC 32.1 (L) 33.6 - 35.0 g/dL    RDW 43.7 35.9 - 50.0 fL    Platelet " Count 374 164 - 446 K/uL    MPV 10.2 9.0 - 12.9 fL    Neutrophils-Polys 52.50 44.00 - 72.00 %    Lymphocytes 40.70 22.00 - 41.00 %    Monocytes 4.70 0.00 - 13.40 %    Eosinophils 1.30 0.00 - 6.90 %    Basophils 0.50 0.00 - 1.80 %    Immature Granulocytes 0.30 0.00 - 0.90 %    Nucleated RBC 0.00 /100 WBC    Neutrophils (Absolute) 4.05 2.00 - 7.15 K/uL    Lymphs (Absolute) 3.14 1.00 - 4.80 K/uL    Monos (Absolute) 0.36 0.00 - 0.85 K/uL    Eos (Absolute) 0.10 0.00 - 0.51 K/uL    Baso (Absolute) 0.04 0.00 - 0.12 K/uL    Immature Granulocytes (abs) 0.02 0.00 - 0.11 K/uL    NRBC (Absolute) 0.00 K/uL   Basic Metabolic Panel   Result Value Ref Range    Sodium 138 135 - 145 mmol/L    Potassium 3.6 3.6 - 5.5 mmol/L    Chloride 107 96 - 112 mmol/L    Co2 20 20 - 33 mmol/L    Glucose 119 (H) 65 - 99 mg/dL    Bun 11 8 - 22 mg/dL    Creatinine 0.57 0.50 - 1.40 mg/dL    Calcium 9.3 8.4 - 10.2 mg/dL    Anion Gap 11.0 7.0 - 16.0   LIPASE   Result Value Ref Range    Lipase 37 7 - 58 U/L   HCG QUAL SERUM   Result Value Ref Range    Beta-Hcg Qualitative Serum Negative Negative   ESTIMATED GFR   Result Value Ref Range    GFR If African American >60 >60 mL/min/1.73 m 2    GFR If Non African American >60 >60 mL/min/1.73 m 2   EKG   Result Value Ref Range    Report       [facility] Emergency Dept.    Test Date:  2022  Pt Name:    JONATHAN DICKERSON                 Department: [facility]  MRN:        [id number]                      Room:  Gender:     Female                       Technician: JOSE  :        1995                   Requested By:ER TRIAGE PROTOCOL  Order #:    [id number]                    Reading MD: Cortes Yu MD    Measurements  Intervals                                Axis  Rate:       93                           P:          41  MI:         144                          QRS:        44  QRSD:       84                           T:          62  QT:         340  QTc:        423    Interpretive  Statements  SINUS RHYTHM  No ST elevations or ST depressions, no T wave inversions, normal axis, QTC  within normal limits at 423  Electronically Signed On 2-8-2022 3:54:33 PST by Cortes Yu MD         Radiology:  CT-HEAD W/O   Final Result         1.  No acute intracranial abnormality.              MDM:  CT brain to evaluate for intracranial hemorrhage versus skull fracture, the patient's persistent vomiting is concerning for acute intracranial process.  EKG demonstrates no evidence of ischemic changes, patient is not high risk for ACS.CMP demonstrates no evidence of acute kidney injury, acute electrolyte abnormality, acute liver failure, CBC demonstrates no evidence of acute anemia or leukocytosis.  Lipase is within normal limits, pancreatitis is inconsistent with patient presentation at this time.  Zofran given for nausea 8 mg however patient is still actively vomiting.  Etiology of syncope is likely secondary to excess benzodiazepine use.  Patient had an orthostatic syncopal episode that occurred 4 days ago with my colleague and I do believe the episode today is a similar ideology.  Compazine given for persistent nausea vomiting.  Disposition pending work-up.    Electronically signed by: Cortes Yu M.D., 2/8/2022, 3:50 AM    CT brain demonstrates no intracranial hemorrhage, no acute intracranial abnormality, no skull fracture. Patient has no neurologic deficits at this time. Nausea has been well controlled with Compazine. Patient be discharged with Zofran. Patient counseled on discontinuing sleep medication and benzodiazepine medication due to concern for repeat head trauma. Patient counseled on postconcussive syndrome. Patient counseled to return the emergency department in 7 days for staple removal.    This dictation has been created using voice recognition software. I am continuously working with the software to minimize the number of voice recognition errors and I have made every attempt  to manually correct the errors within my dictation. However errors  related to this voice recognition software may still exist and should be interpreted within the appropriate context.     Electronically signed by: Cortes Yu M.D., 2/8/2022 5:46 AM    LACERATION REPAIR PROCEDURE NOTE  The patient's identification was confirmed and consent was obtained.  This procedure was performed by Dr. Yu at 5:30 AM.  Site: Posterior scalp  Sterile procedures observed  Anesthetic used (type and amt): 5 cc 1% lidocaine  Suture type/size: Staple  Length: 3 cm  # of staples: 4  Complexity: Simple  Tetanus UTD  Site anesthetized, irrigated with NS, explored without evidence of foreign body, wound well approximated, site covered with dry, sterile dressing. Patient tolerated procedure well without complications. Instructions for care discussed verbally and patient provided with additional written instructions for homecare and f/u.      Disposition:  Home    Final Impression:  1. Laceration of scalp, initial encounter    2. Concussion with loss of consciousness of 30 minutes or less, initial encounter

## 2022-02-08 NOTE — ED NOTES
Discharge instructions reviewed with patient. AVS signed by patient. PIV removed by patient without consult from staff. Prescription electronically sent to pharmacy of choice. Patient understands need for follow-up appointment with healthcare team and to return to ED for worsening symptoms and staple removal. All questions answered at this time. Patient ambulated to exit with belongings & visitor. Patient in stable condition with no signs of distress. Patient agreeable to discharge instructions.

## 2022-02-08 NOTE — ED NOTES
MD Yu at bedside for lidocaine administration to scalp.    Patient's head laceration irrigated with 1L NS.

## 2022-02-11 ENCOUNTER — TELEPHONE (OUTPATIENT)
Dept: MEDICAL GROUP | Facility: LAB | Age: 27
End: 2022-02-11

## 2022-02-11 ENCOUNTER — APPOINTMENT (OUTPATIENT)
Dept: RADIOLOGY | Facility: MEDICAL CENTER | Age: 27
End: 2022-02-11
Attending: EMERGENCY MEDICINE
Payer: COMMERCIAL

## 2022-02-11 ENCOUNTER — HOSPITAL ENCOUNTER (EMERGENCY)
Facility: MEDICAL CENTER | Age: 27
End: 2022-02-11
Attending: EMERGENCY MEDICINE
Payer: COMMERCIAL

## 2022-02-11 VITALS
RESPIRATION RATE: 16 BRPM | SYSTOLIC BLOOD PRESSURE: 109 MMHG | WEIGHT: 176.15 LBS | DIASTOLIC BLOOD PRESSURE: 65 MMHG | HEIGHT: 61 IN | HEART RATE: 77 BPM | BODY MASS INDEX: 33.26 KG/M2 | TEMPERATURE: 98.4 F | OXYGEN SATURATION: 97 %

## 2022-02-11 DIAGNOSIS — R14.0 ABDOMINAL DISTENSION: ICD-10-CM

## 2022-02-11 DIAGNOSIS — R60.0 PEDAL EDEMA: ICD-10-CM

## 2022-02-11 DIAGNOSIS — F41.8 SITUATIONAL ANXIETY: ICD-10-CM

## 2022-02-11 LAB
ALBUMIN SERPL BCP-MCNC: 4.2 G/DL (ref 3.2–4.9)
ALBUMIN/GLOB SERPL: 1.3 G/DL
ALP SERPL-CCNC: 72 U/L (ref 30–99)
ALT SERPL-CCNC: 41 U/L (ref 2–50)
ANION GAP SERPL CALC-SCNC: 9 MMOL/L (ref 7–16)
APPEARANCE UR: CLEAR
AST SERPL-CCNC: 28 U/L (ref 12–45)
BACTERIA #/AREA URNS HPF: ABNORMAL /HPF
BASOPHILS # BLD AUTO: 0.3 % (ref 0–1.8)
BASOPHILS # BLD: 0.02 K/UL (ref 0–0.12)
BILIRUB SERPL-MCNC: 0.2 MG/DL (ref 0.1–1.5)
BILIRUB UR QL STRIP.AUTO: NEGATIVE
BUN SERPL-MCNC: 10 MG/DL (ref 8–22)
CALCIUM SERPL-MCNC: 9.2 MG/DL (ref 8.4–10.2)
CHLORIDE SERPL-SCNC: 104 MMOL/L (ref 96–112)
CO2 SERPL-SCNC: 25 MMOL/L (ref 20–33)
COLOR UR: YELLOW
CREAT SERPL-MCNC: 0.68 MG/DL (ref 0.5–1.4)
EOSINOPHIL # BLD AUTO: 0.08 K/UL (ref 0–0.51)
EOSINOPHIL NFR BLD: 1.2 % (ref 0–6.9)
EPI CELLS #/AREA URNS HPF: ABNORMAL /HPF
ERYTHROCYTE [DISTWIDTH] IN BLOOD BY AUTOMATED COUNT: 46.1 FL (ref 35.9–50)
GLOBULIN SER CALC-MCNC: 3.2 G/DL (ref 1.9–3.5)
GLUCOSE SERPL-MCNC: 77 MG/DL (ref 65–99)
GLUCOSE UR STRIP.AUTO-MCNC: NEGATIVE MG/DL
HCG SERPL QL: NEGATIVE
HCT VFR BLD AUTO: 35.2 % (ref 37–47)
HGB BLD-MCNC: 11.1 G/DL (ref 12–16)
IMM GRANULOCYTES # BLD AUTO: 0.02 K/UL (ref 0–0.11)
IMM GRANULOCYTES NFR BLD AUTO: 0.3 % (ref 0–0.9)
KETONES UR STRIP.AUTO-MCNC: NEGATIVE MG/DL
LEUKOCYTE ESTERASE UR QL STRIP.AUTO: ABNORMAL
LIPASE SERPL-CCNC: 40 U/L (ref 7–58)
LYMPHOCYTES # BLD AUTO: 2.32 K/UL (ref 1–4.8)
LYMPHOCYTES NFR BLD: 34.4 % (ref 22–41)
MCH RBC QN AUTO: 26.9 PG (ref 27–33)
MCHC RBC AUTO-ENTMCNC: 31.5 G/DL (ref 33.6–35)
MCV RBC AUTO: 85.2 FL (ref 81.4–97.8)
MICRO URNS: ABNORMAL
MONOCYTES # BLD AUTO: 0.6 K/UL (ref 0–0.85)
MONOCYTES NFR BLD AUTO: 8.9 % (ref 0–13.4)
MUCOUS THREADS #/AREA URNS HPF: ABNORMAL /HPF
NEUTROPHILS # BLD AUTO: 3.71 K/UL (ref 2–7.15)
NEUTROPHILS NFR BLD: 54.9 % (ref 44–72)
NITRITE UR QL STRIP.AUTO: NEGATIVE
NRBC # BLD AUTO: 0 K/UL
NRBC BLD-RTO: 0 /100 WBC
PH UR STRIP.AUTO: 7 [PH] (ref 5–8)
PLATELET # BLD AUTO: 296 K/UL (ref 164–446)
PMV BLD AUTO: 10.7 FL (ref 9–12.9)
POTASSIUM SERPL-SCNC: 3.9 MMOL/L (ref 3.6–5.5)
PROT SERPL-MCNC: 7.4 G/DL (ref 6–8.2)
PROT UR QL STRIP: NEGATIVE MG/DL
RBC # BLD AUTO: 4.13 M/UL (ref 4.2–5.4)
RBC # URNS HPF: ABNORMAL /HPF
RBC UR QL AUTO: NEGATIVE
SODIUM SERPL-SCNC: 138 MMOL/L (ref 135–145)
SP GR UR STRIP.AUTO: 1.01
UNIDENT CRYS URNS QL MICRO: ABNORMAL /HPF
WBC # BLD AUTO: 6.8 K/UL (ref 4.8–10.8)
WBC #/AREA URNS HPF: ABNORMAL /HPF

## 2022-02-11 PROCEDURE — 84703 CHORIONIC GONADOTROPIN ASSAY: CPT

## 2022-02-11 PROCEDURE — 99284 EMERGENCY DEPT VISIT MOD MDM: CPT

## 2022-02-11 PROCEDURE — 700111 HCHG RX REV CODE 636 W/ 250 OVERRIDE (IP): Performed by: EMERGENCY MEDICINE

## 2022-02-11 PROCEDURE — 700117 HCHG RX CONTRAST REV CODE 255: Performed by: EMERGENCY MEDICINE

## 2022-02-11 PROCEDURE — 96374 THER/PROPH/DIAG INJ IV PUSH: CPT

## 2022-02-11 PROCEDURE — 83690 ASSAY OF LIPASE: CPT

## 2022-02-11 PROCEDURE — 85025 COMPLETE CBC W/AUTO DIFF WBC: CPT

## 2022-02-11 PROCEDURE — 80053 COMPREHEN METABOLIC PANEL: CPT

## 2022-02-11 PROCEDURE — 74177 CT ABD & PELVIS W/CONTRAST: CPT

## 2022-02-11 PROCEDURE — 96375 TX/PRO/DX INJ NEW DRUG ADDON: CPT

## 2022-02-11 PROCEDURE — 36415 COLL VENOUS BLD VENIPUNCTURE: CPT

## 2022-02-11 PROCEDURE — 81001 URINALYSIS AUTO W/SCOPE: CPT

## 2022-02-11 RX ORDER — HYDROMORPHONE HYDROCHLORIDE 1 MG/ML
0.5 INJECTION, SOLUTION INTRAMUSCULAR; INTRAVENOUS; SUBCUTANEOUS ONCE
Status: COMPLETED | OUTPATIENT
Start: 2022-02-11 | End: 2022-02-11

## 2022-02-11 RX ORDER — ONDANSETRON 2 MG/ML
4 INJECTION INTRAMUSCULAR; INTRAVENOUS ONCE
Status: COMPLETED | OUTPATIENT
Start: 2022-02-11 | End: 2022-02-11

## 2022-02-11 RX ORDER — DIAZEPAM 5 MG/1
5 TABLET ORAL
Qty: 1 TABLET | Refills: 0 | Status: SHIPPED | OUTPATIENT
Start: 2022-02-11 | End: 2023-07-20 | Stop reason: SDUPTHER

## 2022-02-11 RX ORDER — DIAZEPAM 5 MG/1
5 TABLET ORAL
Qty: 1 TABLET | Refills: 0 | Status: CANCELLED | OUTPATIENT
Start: 2022-02-11 | End: 2022-02-11

## 2022-02-11 RX ADMIN — HYDROMORPHONE HYDROCHLORIDE 0.5 MG: 1 INJECTION, SOLUTION INTRAMUSCULAR; INTRAVENOUS; SUBCUTANEOUS at 20:23

## 2022-02-11 RX ADMIN — ONDANSETRON 4 MG: 2 INJECTION INTRAMUSCULAR; INTRAVENOUS at 20:24

## 2022-02-11 RX ADMIN — IOHEXOL 100 ML: 350 INJECTION, SOLUTION INTRAVENOUS at 20:01

## 2022-02-11 ASSESSMENT — PAIN DESCRIPTION - PAIN TYPE: TYPE: ACUTE PAIN

## 2022-02-11 ASSESSMENT — FIBROSIS 4 INDEX: FIB4 SCORE: 0.33

## 2022-02-11 NOTE — TELEPHONE ENCOUNTER
1. Caller Name: Sadia Troncoso                          Call Back Number: [phone] (home)         How would the patient prefer to be contacted with a response:     Pt has her MRI appt set for Sunday. She is already starting to have anxiety. Asking if you can send in at least one pill for this appt. She said you mentioned to take clonazepam but she out.

## 2022-02-12 NOTE — ED NOTES
Patient discharged. All discharged instructions reviewed. Patient verbalizes understanding. PIV removed. Patient feels safe going home. Advised to come back for new or worsening symptoms.

## 2022-02-12 NOTE — ED NOTES
"Patient report throbbing in bilateral lower legs and states \"I feel like the swelling is worse.\" pt put in wheelchair and propped her legs up.   "

## 2022-02-12 NOTE — ED PROVIDER NOTES
ED Provider Note    CHIEF COMPLAINT  Chief Complaint   Patient presents with   • Abdominal Pain     Diffuse x 4 d    • Other     Abd distention and foul smelling urine x 4 d   • Peripheral Edema     BLE x 4 d   • Syncope     Passed out 1 wk ago Seen [facility] and moo'd sutures to head Now c/o H/A and blurred vision       HPI  Sadia Troncoso is a 26 y.o. female who presents for numerous complaints including reported bilateral leg swelling lightheadedness dizziness abdominal pain distention foul-smelling urine.  The patient has extensive ER visit history here.  She had a syncopal episode with head injury this monthhas already had 2 CT scans of her head which were negative.  She did require scalp staples.  She denies possibility of pregnancy but does report foul-smelling urine but no dysuria vaginal bleeding or discharge or flank pain or nausea and vomiting.  She denies any other symptoms such as hematemesis hematochezia high fever or productive cough.  No other symptoms reported    REVIEW OF SYSTEMS  See HPI for further details.  No night sweats weight loss numbness tingling weakness rash all other systems are negative.     PAST MEDICAL HISTORY  Past Medical History:   Diagnosis Date   • Anxiety    • Depression    • Liver disease     liver lesions Dx end 2021   • Seizure disorder (HCC)        FAMILY HISTORY  Noncontributory    SOCIAL HISTORY  Social History     Socioeconomic History   • Marital status:      Spouse name: Not on file   • Number of children: 2   • Years of education: Not on file   • Highest education level: Not on file   Occupational History   • Occupation:      Employer: [employer]   Tobacco Use   • Smoking status: Former Smoker     Packs/day: 0.25     Years: 2.00     Pack years: 0.50     Types: Cigarettes   • Smokeless tobacco: Never Used   Vaping Use   • Vaping Use: Never used   Substance and Sexual Activity   • Alcohol use: Not Currently     Alcohol/week: 1.8 oz     Types:  3 Cans of beer per week     Comment: 3 alcoholic seltzers 1 day a week   • Drug use: Yes     Types: Inhaled     Comment: THC CBD   • Sexual activity: Not on file   Other Topics Concern   • Not on file   Social History Narrative   • Not on file     Social Determinants of Health     Financial Resource Strain:    • Difficulty of Paying Living Expenses: Not on file   Food Insecurity:    • Worried About Running Out of Food in the Last Year: Not on file   • Ran Out of Food in the Last Year: Not on file   Transportation Needs:    • Lack of Transportation (Medical): Not on file   • Lack of Transportation (Non-Medical): Not on file   Physical Activity:    • Days of Exercise per Week: Not on file   • Minutes of Exercise per Session: Not on file   Stress:    • Feeling of Stress : Not on file   Social Connections:    • Frequency of Communication with Friends and Family: Not on file   • Frequency of Social Gatherings with Friends and Family: Not on file   • Attends Confucianism Services: Not on file   • Active Member of Clubs or Organizations: Not on file   • Attends Club or Organization Meetings: Not on file   • Marital Status: Not on file   Intimate Partner Violence:    • Fear of Current or Ex-Partner: Not on file   • Emotionally Abused: Not on file   • Physically Abused: Not on file   • Sexually Abused: Not on file   Housing Stability:    • Unable to Pay for Housing in the Last Year: Not on file   • Number of Places Lived in the Last Year: Not on file   • Unstable Housing in the Last Year: Not on file       SURGICAL HISTORY  Past Surgical History:   Procedure Laterality Date   • APPENDECTOMY         CURRENT MEDICATIONS  No current facility-administered medications for this encounter.    Current Outpatient Medications:   •  diazePAM (VALIUM) 5 MG Tab, Take 1 Tablet by mouth one time during MRI for 1 dose. 30min prior to MRI, Disp: 1 Tablet, Rfl: 0  •  bacitracin 500 UNIT/GM ointment, Apply 1 Each topically 2 times a day for 7  "days., Disp: 1 Packet, Rfl: 0  •  clonazePAM (KLONOPIN) 1 MG Tab, , Disp: , Rfl:       ALLERGIES  No Known Allergies    PHYSICAL EXAM  VITAL SIGNS: /65   Pulse 77   Temp 37 °C (98.6 °F) (Temporal)   Resp 15   Ht 1.549 m (5' 1\")   Wt 79.9 kg (176 lb 2.4 oz)   LMP 12/28/2021 (Exact Date)   SpO2 97%   BMI 33.28 kg/m²       Constitutional: Well developed, Well nourished, No acute distress, Non-toxic appearance.   HENT: Normocephalic, Atraumatic, Bilateral external ears normal, Oropharynx moist, No oral exudates, Nose normal.   Eyes: PERRLA, EOMI, Conjunctiva normal, No discharge.   Neck: Normal range of motion, No tenderness, Supple, No stridor. .   Cardiovascular: Normal heart rate, Normal rhythm, No murmurs, No rubs, No gallops.   Thorax & Lungs: Normal breath sounds, No respiratory distress, No wheezing, No chest tenderness.   Abdomen: Bowel sounds normal, Soft, No tenderness, No masses, No pulsatile masses.   Skin: Warm, Dry, No erythema, No rash.   Back: No midline bony tenderness, No CVA tenderness.   Moderate abdominal distention no palpable mass no rebound guarding or rigidity.  No fluid wave extremities: Intact distal pulses, 2+ bilateral pedal edema, No tenderness, No cyanosis, No clubbing.   Musculoskeletal: Good range of motion in all major joints. No tenderness to palpation or major deformities noted.   Neurologic: Alert & oriented x 3, Normal motor function, Normal sensory function, No focal deficits noted.   Psychiatric: Anxious    Results for orders placed or performed during the hospital encounter of 02/11/22   CBC WITH DIFFERENTIAL   Result Value Ref Range    WBC 6.8 4.8 - 10.8 K/uL    RBC 4.13 (L) 4.20 - 5.40 M/uL    Hemoglobin 11.1 (L) 12.0 - 16.0 g/dL    Hematocrit 35.2 (L) 37.0 - 47.0 %    MCV 85.2 81.4 - 97.8 fL    MCH 26.9 (L) 27.0 - 33.0 pg    MCHC 31.5 (L) 33.6 - 35.0 g/dL    RDW 46.1 35.9 - 50.0 fL    Platelet Count 296 164 - 446 K/uL    MPV 10.7 9.0 - 12.9 fL    " Neutrophils-Polys 54.90 44.00 - 72.00 %    Lymphocytes 34.40 22.00 - 41.00 %    Monocytes 8.90 0.00 - 13.40 %    Eosinophils 1.20 0.00 - 6.90 %    Basophils 0.30 0.00 - 1.80 %    Immature Granulocytes 0.30 0.00 - 0.90 %    Nucleated RBC 0.00 /100 WBC    Neutrophils (Absolute) 3.71 2.00 - 7.15 K/uL    Lymphs (Absolute) 2.32 1.00 - 4.80 K/uL    Monos (Absolute) 0.60 0.00 - 0.85 K/uL    Eos (Absolute) 0.08 0.00 - 0.51 K/uL    Baso (Absolute) 0.02 0.00 - 0.12 K/uL    Immature Granulocytes (abs) 0.02 0.00 - 0.11 K/uL    NRBC (Absolute) 0.00 K/uL   COMP METABOLIC PANEL   Result Value Ref Range    Sodium 138 135 - 145 mmol/L    Potassium 3.9 3.6 - 5.5 mmol/L    Chloride 104 96 - 112 mmol/L    Co2 25 20 - 33 mmol/L    Anion Gap 9.0 7.0 - 16.0    Glucose 77 65 - 99 mg/dL    Bun 10 8 - 22 mg/dL    Creatinine 0.68 0.50 - 1.40 mg/dL    Calcium 9.2 8.4 - 10.2 mg/dL    AST(SGOT) 28 12 - 45 U/L    ALT(SGPT) 41 2 - 50 U/L    Alkaline Phosphatase 72 30 - 99 U/L    Total Bilirubin 0.2 0.1 - 1.5 mg/dL    Albumin 4.2 3.2 - 4.9 g/dL    Total Protein 7.4 6.0 - 8.2 g/dL    Globulin 3.2 1.9 - 3.5 g/dL    A-G Ratio 1.3 g/dL   LIPASE   Result Value Ref Range    Lipase 40 7 - 58 U/L   URINALYSIS,CULTURE IF INDICATED    Specimen: Urine, Clean Catch   Result Value Ref Range    Color Yellow     Character Clear     Specific Gravity 1.015 <1.035    Ph 7.0 5.0 - 8.0    Glucose Negative Negative mg/dL    Ketones Negative Negative mg/dL    Protein Negative Negative mg/dL    Bilirubin Negative Negative    Nitrite Negative Negative    Leukocyte Esterase Trace (A) Negative    Occult Blood Negative Negative    Micro Urine Req Microscopic    URINE MICROSCOPIC (W/UA)   Result Value Ref Range    WBC 0-2 /hpf    RBC 0-2 /hpf    Bacteria Few (A) None /hpf    Epithelial Cells Few Few /hpf    Mucous Threads Few /hpf    Urine Crystals Few Amorphous /hpf   HCG QUAL SERUM   Result Value Ref Range    Beta-Hcg Qualitative Serum Negative Negative   ESTIMATED GFR    Result Value Ref Range    GFR If African American >60 >60 mL/min/1.73 m 2    GFR If Non African American >60 >60 mL/min/1.73 m 2      CT-ABDOMEN-PELVIS WITH   Final Result         1.  Trace bilateral pleural effusions   2.  Low-density right hepatic lobe lesions, appearance suggests small cyst or hemangioma, otherwise indeterminate. Stable since prior study.   3.  Sclerotic and slightly lytic changes of the bilateral SI joints, appearance favors changes of sacroiliitis which has a broad differential. Additional workup as clinically appropriate.          COURSE & MEDICAL DECISION MAKING  Pertinent Labs & Imaging studies reviewed. (See chart for details)  An IV was established.  Reviewed the patient's visit history.  She has extensive visit history here in the ER.  Here she appears clinically nontoxic.  She has no high fever tachycardia or hypotension.  She has had several ER visits for a syncopal event.  Here her CBC is unremarkable other than mild anemia but this does appear to be stable.  Is no bandemia.  Metabolic panel does not demonstrate any metabolic acidosis electrolyte derangement or renal or hepatic insufficiency.  Pregnancy is negative and urinalysis does not clearly suggest UTI.  Subsequent CT scan of the abdomen pelvis demonstrates several likely incidental findings including bilateral trace effusions and a small hepatic lesion and some lytic changes in the sacroiliac joints but nothing acute such as ascites tumor, bowel obstruction etc.  I counseled the patient that she needs to follow-up with her PCP for ongoing management.    FINAL IMPRESSION  1.  Abdominal distention  2.  Pedal edema         Electronically signed by: Jono Narayan M.D., 2/11/2022 7:38 PM

## 2022-02-12 NOTE — ED NOTES
Pt instructed to have someone to drive home after narcotic administration. Pt agreed on not driving after receiving narcotics. Pt medicated as directed by ERP.

## 2022-02-13 ENCOUNTER — HOSPITAL ENCOUNTER (OUTPATIENT)
Dept: RADIOLOGY | Facility: MEDICAL CENTER | Age: 27
End: 2022-02-13
Attending: PHYSICIAN ASSISTANT
Payer: COMMERCIAL

## 2022-02-13 DIAGNOSIS — K76.9 LIVER DISEASE: ICD-10-CM

## 2022-02-13 PROCEDURE — 700117 HCHG RX CONTRAST REV CODE 255: Performed by: PHYSICIAN ASSISTANT

## 2022-02-13 PROCEDURE — 74183 MRI ABD W/O CNTR FLWD CNTR: CPT

## 2022-02-13 PROCEDURE — A9581 GADOXETATE DISODIUM INJ: HCPCS | Performed by: PHYSICIAN ASSISTANT

## 2022-02-13 RX ADMIN — GADOXETATE DISODIUM 10 ML: 181.43 INJECTION, SOLUTION INTRAVENOUS at 13:04

## 2022-02-16 ENCOUNTER — APPOINTMENT (OUTPATIENT)
Dept: MEDICAL GROUP | Facility: LAB | Age: 27
End: 2022-02-16
Payer: COMMERCIAL

## 2022-02-16 ENCOUNTER — TELEPHONE (OUTPATIENT)
Dept: MEDICAL GROUP | Facility: LAB | Age: 27
End: 2022-02-16

## 2022-02-16 DIAGNOSIS — R16.0 LIVER MASSES: ICD-10-CM

## 2022-02-16 NOTE — PROGRESS NOTES
Subjective:     CC: ***    HPI:   Coyle here today with ***    No problems updated.      ROS:  Gen: no fevers/chills, no changes in weight  Eyes: no changes in vision  ENT: no sore throat, no hearing loss, no bloody nose  Pulm: no sob, no cough  CV: no chest pain, no palpitations  GI: no nausea/vomiting, no diarrhea  : no dysuria  MSk: no myalgias  Skin: no rash  Neuro: no headaches, no numbness/tingling  Heme/Lymph: no easy bruising    Current Outpatient Medications Ordered in Epic   Medication Sig Dispense Refill   • clonazePAM (KLONOPIN) 1 MG Tab        No current Epic-ordered facility-administered medications on file.       Health Maintenance: {COMPLETED:207023}    Objective:     Exam:  There were no vitals taken for this visit. There is no height or weight on file to calculate BMI.    Gen: Alert and oriented, No apparent distress.  Neck: Neck is supple without lymphadenopathy.  Lungs: Normal effort, CTA bilaterally, no wheezes, rhonchi, or rales  CV: Regular rate and rhythm. No murmurs, rubs, or gallops.  Ext: No clubbing, cyanosis, edema.    A chaperone was offered to the patient during today's exam. {CHAPERONE:23381}    Labs: ***    Assessment & Plan:     26 y.o. female with the following -     Problem List Items Addressed This Visit    None         I spent a total of *** minutes with record review, exam, communication with the patient, communication with other providers, and documentation of this encounter.      No follow-ups on file.    Please note that this dictation was created using voice recognition software. I have made every reasonable attempt to correct obvious errors, but there may be errors of grammar and possibly content that I did not discover before finalizing the note.

## 2022-02-16 NOTE — TELEPHONE ENCOUNTER
----- Message from Sadia Troncoso sent at 2/15/2022  7:32 PM PST -----  Regarding: Symptom List  Kelly Franco said to let her know if I have any questions in regards to my MRI results so I hope it’s okay I am messaging my questions in response to her message. I also do want a GI referral and put the preferred doctor for that below.      Very concerned about the growth rate and how many new growths there are from the original two that were found. As well as swelling of my spleen. I’m sure you have seen all the latest ER records in regards to swelling and abdominal pain. I’m really struggling. I also am concerned because I did have some high inflammation indicators in blood work. It all just doesn’t seem like these growths benign or not are good. I feel a biopsy should maybe be done? Since I’m getting so many of these masses in such a short period of time. I can’t imagine going on for another 6 months and nothing proactive to stop that from happening plus all these other symptoms. I do have a appointment with you next Friday so we can go over everything further. I also would please like to be referred to Hanh Aponte MD at [facility].  [place] [facility] [address] [place] [address] Phone: [phone] Fax: [phone]    Thank you so much for your help.

## 2022-02-18 ENCOUNTER — HOSPITAL ENCOUNTER (OUTPATIENT)
Dept: LAB | Facility: MEDICAL CENTER | Age: 27
End: 2022-02-18
Attending: PHYSICIAN ASSISTANT
Payer: COMMERCIAL

## 2022-02-18 ENCOUNTER — OFFICE VISIT (OUTPATIENT)
Dept: MEDICAL GROUP | Facility: LAB | Age: 27
End: 2022-02-18
Payer: COMMERCIAL

## 2022-02-18 VITALS
BODY MASS INDEX: 32.27 KG/M2 | WEIGHT: 170.9 LBS | TEMPERATURE: 97.9 F | HEIGHT: 61 IN | HEART RATE: 75 BPM | OXYGEN SATURATION: 97 % | SYSTOLIC BLOOD PRESSURE: 114 MMHG | RESPIRATION RATE: 16 BRPM | DIASTOLIC BLOOD PRESSURE: 64 MMHG

## 2022-02-18 DIAGNOSIS — R53.82 CHRONIC FATIGUE: ICD-10-CM

## 2022-02-18 DIAGNOSIS — E07.9 SWELLING OF THYROID GLAND: ICD-10-CM

## 2022-02-18 DIAGNOSIS — E87.1 HYPONATREMIA: ICD-10-CM

## 2022-02-18 DIAGNOSIS — D18.03 HEMANGIOMA OF LIVER: ICD-10-CM

## 2022-02-18 DIAGNOSIS — G47.00 INSOMNIA, UNSPECIFIED TYPE: ICD-10-CM

## 2022-02-18 DIAGNOSIS — D64.9 ANEMIA, UNSPECIFIED TYPE: ICD-10-CM

## 2022-02-18 LAB
25(OH)D3 SERPL-MCNC: 23 NG/ML (ref 30–100)
ALBUMIN SERPL BCP-MCNC: 4.6 G/DL (ref 3.2–4.9)
ALBUMIN/GLOB SERPL: 1.4 G/DL
ALP SERPL-CCNC: 81 U/L (ref 30–99)
ALT SERPL-CCNC: 31 U/L (ref 2–50)
ANION GAP SERPL CALC-SCNC: 12 MMOL/L (ref 7–16)
AST SERPL-CCNC: 25 U/L (ref 12–45)
BILIRUB SERPL-MCNC: 0.3 MG/DL (ref 0.1–1.5)
BUN SERPL-MCNC: 11 MG/DL (ref 8–22)
CALCIUM SERPL-MCNC: 9.5 MG/DL (ref 8.5–10.5)
CHLORIDE SERPL-SCNC: 103 MMOL/L (ref 96–112)
CO2 SERPL-SCNC: 23 MMOL/L (ref 20–33)
CREAT SERPL-MCNC: 0.7 MG/DL (ref 0.5–1.4)
CRP SERPL HS-MCNC: 0.67 MG/DL (ref 0–0.75)
FERRITIN SERPL-MCNC: 14.9 NG/ML (ref 10–291)
GLOBULIN SER CALC-MCNC: 3.2 G/DL (ref 1.9–3.5)
GLUCOSE SERPL-MCNC: 86 MG/DL (ref 65–99)
IRON SATN MFR SERPL: 11 % (ref 15–55)
IRON SERPL-MCNC: 41 UG/DL (ref 40–170)
POTASSIUM SERPL-SCNC: 3.9 MMOL/L (ref 3.6–5.5)
PROT SERPL-MCNC: 7.8 G/DL (ref 6–8.2)
SODIUM SERPL-SCNC: 138 MMOL/L (ref 135–145)
T3FREE SERPL-MCNC: 3.34 PG/ML (ref 2–4.4)
T4 FREE SERPL-MCNC: 1.04 NG/DL (ref 0.93–1.7)
TIBC SERPL-MCNC: 384 UG/DL (ref 250–450)
TSH SERPL DL<=0.005 MIU/L-ACNC: 2.39 UIU/ML (ref 0.38–5.33)
UIBC SERPL-MCNC: 343 UG/DL (ref 110–370)

## 2022-02-18 PROCEDURE — 85025 COMPLETE CBC W/AUTO DIFF WBC: CPT

## 2022-02-18 PROCEDURE — 86038 ANTINUCLEAR ANTIBODIES: CPT

## 2022-02-18 PROCEDURE — 86140 C-REACTIVE PROTEIN: CPT

## 2022-02-18 PROCEDURE — 83550 IRON BINDING TEST: CPT

## 2022-02-18 PROCEDURE — 85652 RBC SED RATE AUTOMATED: CPT

## 2022-02-18 PROCEDURE — 99214 OFFICE O/P EST MOD 30 MIN: CPT | Performed by: PHYSICIAN ASSISTANT

## 2022-02-18 PROCEDURE — 84439 ASSAY OF FREE THYROXINE: CPT

## 2022-02-18 PROCEDURE — 80053 COMPREHEN METABOLIC PANEL: CPT

## 2022-02-18 PROCEDURE — 83540 ASSAY OF IRON: CPT

## 2022-02-18 PROCEDURE — 84443 ASSAY THYROID STIM HORMONE: CPT

## 2022-02-18 PROCEDURE — 84481 FREE ASSAY (FT-3): CPT

## 2022-02-18 PROCEDURE — 82728 ASSAY OF FERRITIN: CPT

## 2022-02-18 PROCEDURE — 36415 COLL VENOUS BLD VENIPUNCTURE: CPT

## 2022-02-18 PROCEDURE — 82306 VITAMIN D 25 HYDROXY: CPT

## 2022-02-18 RX ORDER — CLONAZEPAM 1 MG/1
1 TABLET ORAL NIGHTLY PRN
Qty: 30 TABLET | Refills: 0 | Status: SHIPPED | OUTPATIENT
Start: 2022-02-18 | End: 2022-03-20

## 2022-02-18 ASSESSMENT — FIBROSIS 4 INDEX: FIB4 SCORE: 0.41

## 2022-02-19 PROBLEM — D18.03 HEMANGIOMA OF LIVER: Status: ACTIVE | Noted: 2022-02-19

## 2022-02-19 LAB
BASOPHILS # BLD AUTO: 0.5 % (ref 0–1.8)
BASOPHILS # BLD: 0.03 K/UL (ref 0–0.12)
EOSINOPHIL # BLD AUTO: 0.07 K/UL (ref 0–0.51)
EOSINOPHIL NFR BLD: 1.2 % (ref 0–6.9)
ERYTHROCYTE [DISTWIDTH] IN BLOOD BY AUTOMATED COUNT: 46.7 FL (ref 35.9–50)
ERYTHROCYTE [SEDIMENTATION RATE] IN BLOOD BY WESTERGREN METHOD: 23 MM/HOUR (ref 0–25)
HCT VFR BLD AUTO: 37 % (ref 37–47)
HGB BLD-MCNC: 11.8 G/DL (ref 12–16)
IMM GRANULOCYTES # BLD AUTO: 0.02 K/UL (ref 0–0.11)
IMM GRANULOCYTES NFR BLD AUTO: 0.3 % (ref 0–0.9)
LYMPHOCYTES # BLD AUTO: 2.13 K/UL (ref 1–4.8)
LYMPHOCYTES NFR BLD: 36.2 % (ref 22–41)
MCH RBC QN AUTO: 27.3 PG (ref 27–33)
MCHC RBC AUTO-ENTMCNC: 31.9 G/DL (ref 33.6–35)
MCV RBC AUTO: 85.5 FL (ref 81.4–97.8)
MONOCYTES # BLD AUTO: 0.36 K/UL (ref 0–0.85)
MONOCYTES NFR BLD AUTO: 6.1 % (ref 0–13.4)
NEUTROPHILS # BLD AUTO: 3.28 K/UL (ref 2–7.15)
NEUTROPHILS NFR BLD: 55.7 % (ref 44–72)
NRBC # BLD AUTO: 0 K/UL
NRBC BLD-RTO: 0 /100 WBC
PLATELET # BLD AUTO: 410 K/UL (ref 164–446)
PMV BLD AUTO: 11.9 FL (ref 9–12.9)
RBC # BLD AUTO: 4.33 M/UL (ref 4.2–5.4)
WBC # BLD AUTO: 5.9 K/UL (ref 4.8–10.8)

## 2022-02-20 NOTE — ASSESSMENT & PLAN NOTE
New to me; follow up from recent MRI:  IMPRESSION:  1.  Multiple hepatic masses. Signal and enhancement characteristics are most suggestive of atypical hemangiomata. Consider follow-up MRI in approximately 6 months.  2.  Borderline splenomegaly  3.  Trace bilateral pleural effusions    *Pt is scheduled to see GI next week, considering possible biopsy?

## 2022-02-20 NOTE — PROGRESS NOTES
"Subjective:   CC: Sadia Troncoso is a 26 y.o. female here today for imaging follow up     HPI:  Hemangioma of liver  New to me; follow up from recent MRI:  IMPRESSION:  1.  Multiple hepatic masses. Signal and enhancement characteristics are most suggestive of atypical hemangiomata. Consider follow-up MRI in approximately 6 months.  2.  Borderline splenomegaly  3.  Trace bilateral pleural effusions    *Pt is scheduled to see GI next week, considering possible biopsy?         Current medicines (including changes today)  Current Outpatient Medications   Medication Sig Dispense Refill   • clonazePAM (KLONOPIN) 1 MG Tab Take 1 Tablet by mouth at bedtime as needed for up to 30 days. 30 Tablet 0     No current facility-administered medications for this visit.     She  has a past medical history of Anxiety, Depression, Liver disease, and Seizure disorder (HCC).    ROS   +severe fatigue  No chest pain, no shortness of breath, + abdominal pain  +depression and anxiety  +generalized body aches        Objective:     /64 (BP Location: Left arm, Patient Position: Sitting, BP Cuff Size: Adult)   Pulse 75   Temp 36.6 °C (97.9 °F) (Temporal)   Resp 16   Ht 1.549 m (5' 1\")   Wt 77.5 kg (170 lb 14.4 oz)   SpO2 97%  Body mass index is 32.29 kg/m².   Physical Exam:  Constitutional: Alert, no distress.  Skin: Warm, dry, good turgor, no rashes in visible areas.  Eye: Equal, round, conjunctiva clear, lids normal.  Neck: Trachea midline, no masses, symmetric thyromegaly. No cervical or supraclavicular lymphadenopathy  Respiratory: Unlabored respiratory effort, lungs clear to auscultation, no wheezes, no ronchi.  Cardiovascular: Normal S1, S2, no murmur, no edema.  Psych: Alert and oriented x3, normal affect and mood.    Assessment and Plan:   The following treatment plan was discussed    1. Swelling of thyroid gland  - US-THYROID; Future    2. Insomnia, unspecified type  Follow up; chronic and stable on regimen.   Refill as " written.   Pt was educated on use and SEs of medication.  She is aware that these are habit forming and of course can cause drowsiness. She is aware that these should not be used in addition to any other sedating substance.   F/u in 4 weeks; sooner if needed.   - clonazePAM (KLONOPIN) 1 MG Tab; Take 1 Tablet by mouth at bedtime as needed for up to 30 days.  Dispense: 30 Tablet; Refill: 0    3. Hemangioma of liver  Reviewed MRI with the patient today  She has an appt with GI scheduled for next week.   ?Consider Biopsy?    My total time spent caring for the patient on the day of the encounter was 30 minutes.   This does not include time spent on separately billable procedures/tests.    Followup: Return in about 4 weeks (around 3/18/2022), or if symptoms worsen or fail to improve.       JOE CarusoATRAN.  Supervising MD: Dr. David Gonzalez MD  02/19/22

## 2022-02-22 LAB — NUCLEAR IGG SER QL IA: NORMAL

## 2022-02-24 ENCOUNTER — TELEPHONE (OUTPATIENT)
Dept: MEDICAL GROUP | Facility: LAB | Age: 27
End: 2022-02-24
Payer: COMMERCIAL

## 2022-02-24 DIAGNOSIS — J90 PLEURAL EFFUSION: ICD-10-CM

## 2022-02-24 NOTE — TELEPHONE ENCOUNTER
Patient called stating she has had a fever of 100 for a day, she is taking ibuprofen and tylenol. She is also feeling very weak. She is not sure what she needs to do next.

## 2022-02-26 ENCOUNTER — HOSPITAL ENCOUNTER (OUTPATIENT)
Dept: RADIOLOGY | Facility: MEDICAL CENTER | Age: 27
End: 2022-02-26
Attending: PHYSICIAN ASSISTANT
Payer: COMMERCIAL

## 2022-02-26 DIAGNOSIS — E07.9 SWELLING OF THYROID GLAND: ICD-10-CM

## 2022-02-26 DIAGNOSIS — J90 PLEURAL EFFUSION: ICD-10-CM

## 2022-02-26 PROCEDURE — 76536 US EXAM OF HEAD AND NECK: CPT

## 2022-02-26 PROCEDURE — 71046 X-RAY EXAM CHEST 2 VIEWS: CPT

## 2022-02-28 ENCOUNTER — PATIENT MESSAGE (OUTPATIENT)
Dept: MEDICAL GROUP | Facility: LAB | Age: 27
End: 2022-02-28
Payer: COMMERCIAL

## 2022-02-28 DIAGNOSIS — E07.9 SWELLING OF THYROID GLAND: ICD-10-CM

## 2022-02-28 DIAGNOSIS — E04.1 THYROID NODULE: ICD-10-CM

## 2022-02-28 NOTE — PROGRESS NOTES
1. Swelling of thyroid gland  Referral to Endocrinology   2. Thyroid nodule  Referral to Endocrinology     Joan King P.A.-C.

## 2022-02-28 NOTE — TELEPHONE ENCOUNTER
----- Message from Sadia Troncoso sent at 2/28/2022  9:56 AM PST -----  Regarding: Follow Up  Yes, please refer me to that specialist for my thyroid.     Thank you.

## 2022-03-01 ENCOUNTER — HOSPITAL ENCOUNTER (OUTPATIENT)
Dept: LAB | Facility: MEDICAL CENTER | Age: 27
End: 2022-03-01
Attending: INTERNAL MEDICINE
Payer: COMMERCIAL

## 2022-03-01 LAB
FERRITIN SERPL-MCNC: 17.7 NG/ML (ref 10–291)
HBV SURFACE AB SERPL IA-ACNC: 38.1 MIU/ML (ref 0–10)
HBV SURFACE AG SER QL: NORMAL
HCV AB SER QL: NORMAL
IRON SATN MFR SERPL: 9 % (ref 15–55)
IRON SERPL-MCNC: 35 UG/DL (ref 40–170)
TIBC SERPL-MCNC: 372 UG/DL (ref 250–450)
UIBC SERPL-MCNC: 337 UG/DL (ref 110–370)

## 2022-03-01 PROCEDURE — 83550 IRON BINDING TEST: CPT

## 2022-03-01 PROCEDURE — 86038 ANTINUCLEAR ANTIBODIES: CPT

## 2022-03-01 PROCEDURE — 83516 IMMUNOASSAY NONANTIBODY: CPT

## 2022-03-01 PROCEDURE — 86706 HEP B SURFACE ANTIBODY: CPT

## 2022-03-01 PROCEDURE — 82390 ASSAY OF CERULOPLASMIN: CPT

## 2022-03-01 PROCEDURE — 83540 ASSAY OF IRON: CPT

## 2022-03-01 PROCEDURE — 82728 ASSAY OF FERRITIN: CPT

## 2022-03-01 PROCEDURE — 86015 ACTIN ANTIBODY EACH: CPT

## 2022-03-01 PROCEDURE — 86803 HEPATITIS C AB TEST: CPT

## 2022-03-01 PROCEDURE — 87340 HEPATITIS B SURFACE AG IA: CPT

## 2022-03-01 PROCEDURE — 36415 COLL VENOUS BLD VENIPUNCTURE: CPT

## 2022-03-03 LAB — NUCLEAR IGG SER QL IA: NORMAL

## 2022-03-04 LAB
GLIADIN PEPTIDE+TTG IGA+IGG SER QL IA: 8 UNITS (ref 0–19)
SMA IGG SER-ACNC: 8 UNITS (ref 0–19)

## 2022-03-07 LAB — CERULOPLASMIN SERPL-MCNC: 33 MG/DL (ref 16–45)

## 2022-03-23 ENCOUNTER — TELEMEDICINE (OUTPATIENT)
Dept: MEDICAL GROUP | Facility: LAB | Age: 27
End: 2022-03-23
Payer: COMMERCIAL

## 2022-03-23 VITALS — HEIGHT: 61 IN | WEIGHT: 170 LBS | BODY MASS INDEX: 32.1 KG/M2

## 2022-03-23 DIAGNOSIS — F41.9 ANXIETY: ICD-10-CM

## 2022-03-23 PROCEDURE — 99213 OFFICE O/P EST LOW 20 MIN: CPT | Mod: 95 | Performed by: PHYSICIAN ASSISTANT

## 2022-03-23 RX ORDER — CLONAZEPAM 1 MG/1
1 TABLET ORAL DAILY
Qty: 30 TABLET | Refills: 0 | Status: SHIPPED | OUTPATIENT
Start: 2022-03-23 | End: 2022-03-29

## 2022-03-23 ASSESSMENT — FIBROSIS 4 INDEX: FIB4 SCORE: 0.31

## 2022-03-23 NOTE — ASSESSMENT & PLAN NOTE
Follow-up, chronic condition.  Ongoing anxiety regarding health.  This is been improving a little bit since being able to finally establish with several specialists.  Requesting refill of clonazepam.

## 2022-03-23 NOTE — PROGRESS NOTES
This evaluation was conducted via Zoom using secure and encrypted videoconferencing technology. The patient was in their home in the [facility].    The patient's identity was confirmed and verbal consent was obtained for this virtual visit.    Subjective:     CC: Medication refill   Sadia Troncoso is a 26 y.o. female presenting to discuss the evaluation and management of anxiety     Anxiety  Follow-up, chronic condition.  Ongoing anxiety regarding health.  This is been improving a little bit since being able to finally establish with several specialists.  Requesting refill of clonazepam.        ROS  See HPI  Constitutional: Negative for fever, chills and malaise/fatigue.   Respiratory: Negative for cough and shortness of breath.    Cardiovascular: Negative for leg swelling.   Skin: Negative for rash.   Psychiatric/Behavioral: +anxiety    No Known Allergies    Current medicines (including changes today)  Current Outpatient Medications   Medication Sig Dispense Refill   • clonazePAM (KLONOPIN) 1 MG Tab Take 1 Tablet by mouth every day for 30 days. 30 Tablet 0     No current facility-administered medications for this visit.       She  has a past medical history of Anxiety, Depression, Liver disease, and Seizure disorder (HCC).  She  has a past surgical history that includes appendectomy.      Family History   Problem Relation Age of Onset   • Seizures Maternal Grandmother    • Colorectal Cancer Maternal Grandmother    • Seizures Maternal Grandfather    • Colorectal Cancer Maternal Grandfather    • Seizures Paternal Grandmother    • Seizures Paternal Grandfather    • Lung Cancer Daughter      Family Status   Relation Name Status   • MGMo     • MGFa     • PGMo  (Not Specified)   • PGFa  (Not Specified)   • Edilson         Patient Active Problem List    Diagnosis Date Noted   • Anxiety 2022   • Hemangioma of liver 2022   • Liver disease 2022   • Close exposure to COVID-19 virus  "01/20/2022   • Severe recurrent major depression (HCC) 08/18/2021   • Chronic fatigue 10/01/2020   • Hypokalemia 09/02/2020   • Anemia 09/02/2020   • Psychiatric pseudoseizure 07/16/2020   • Lethargy 07/16/2020   • Intractable vomiting with nausea 07/16/2020   • Loss of sensation 07/16/2020   • Weakness 07/16/2020   • Migraine 07/16/2020   • Lumbar radiculopathy 09/13/2018   • Acute post-hemorrhagic anemia 09/10/2018          Objective:   Ht 1.549 m (5' 0.98\")   Wt 77.1 kg (170 lb)   BMI 32.14 kg/m²     Physical Exam:  Constitutional: Alert, no distress, well-groomed.  Skin: No rashes in visible areas.  Eye: Round. Conjunctiva clear, lids normal. No icterus.   ENMT: Lips pink without lesions, good dentition, moist mucous membranes. Phonation normal.  Neck: No masses, no thyromegaly. Moves freely without pain.  Respiratory: Unlabored respiratory effort, no cough or audible wheeze  Psych: Alert and oriented x3, normal affect and mood.       Assessment and Plan:   The following treatment plan was discussed:     1. Anxiety  Follow-up, chronic condition.  PDMP has been reviewed.  Refill for clonazepam as written.  Continue to monitor symptoms, follow-up in 1 month.  I am hoping this will improve once she gets more answers regarding chronic conditions after several follow-ups from several different specialists.  - clonazePAM (KLONOPIN) 1 MG Tab; Take 1 Tablet by mouth every day for 30 days.  Dispense: 30 Tablet; Refill: 0        Follow-up: Return in about 4 weeks (around 4/20/2022), or if symptoms worsen or fail to improve.    Joan King P.A.-C.  Supervising MD: Dr. David Gonzalez MD  03/23/22  "

## 2022-03-29 ENCOUNTER — OFFICE VISIT (OUTPATIENT)
Dept: SURGICAL ONCOLOGY | Facility: MEDICAL CENTER | Age: 27
End: 2022-03-29
Payer: COMMERCIAL

## 2022-03-29 VITALS
BODY MASS INDEX: 28.85 KG/M2 | HEART RATE: 99 BPM | OXYGEN SATURATION: 96 % | SYSTOLIC BLOOD PRESSURE: 108 MMHG | WEIGHT: 169 LBS | DIASTOLIC BLOOD PRESSURE: 68 MMHG | TEMPERATURE: 97.7 F | HEIGHT: 64 IN

## 2022-03-29 DIAGNOSIS — K76.9 LIVER DISEASE: ICD-10-CM

## 2022-03-29 DIAGNOSIS — D18.03 HEMANGIOMA OF LIVER: ICD-10-CM

## 2022-03-29 PROCEDURE — 99205 OFFICE O/P NEW HI 60 MIN: CPT | Performed by: SURGERY

## 2022-03-29 ASSESSMENT — ENCOUNTER SYMPTOMS
FEVER: 0
WHEEZING: 0
CONSTIPATION: 0
STRIDOR: 0
WEIGHT LOSS: 0
DOUBLE VISION: 0
SEIZURES: 1
DIARRHEA: 1
BRUISES/BLEEDS EASILY: 1
NAUSEA: 0
SPEECH CHANGE: 0
DEPRESSION: 1
SHORTNESS OF BREATH: 0
MYALGIAS: 1
COUGH: 0
HEARTBURN: 0
PALPITATIONS: 0
BACK PAIN: 0
MEMORY LOSS: 0
ABDOMINAL PAIN: 1
TREMORS: 0
WEAKNESS: 1
BLOOD IN STOOL: 1
DIZZINESS: 1
NERVOUS/ANXIOUS: 1
SORE THROAT: 0
EYE PAIN: 0
VOMITING: 0
POLYDIPSIA: 0
HEADACHES: 0
BLURRED VISION: 0
HALLUCINATIONS: 0
FOCAL WEAKNESS: 0
FALLS: 0
CHILLS: 1

## 2022-03-29 ASSESSMENT — FIBROSIS 4 INDEX: FIB4 SCORE: 0.31

## 2022-03-30 ENCOUNTER — TELEPHONE (OUTPATIENT)
Dept: MEDICAL GROUP | Facility: LAB | Age: 27
End: 2022-03-30
Payer: COMMERCIAL

## 2022-03-30 NOTE — TELEPHONE ENCOUNTER
1. Caller Name: Sadia Troncoso                          Call Back Number: [phone] (home)         How would the patient prefer to be contacted with a response: Phone call OK to leave a detailed message    Pt forgot to mention her vertigo, this has been going on for about 3 weeks. She had to leave work early today. Mostly when she is laying down. Seems to be getting more frequent.

## 2022-03-31 NOTE — TELEPHONE ENCOUNTER
She can try an allergy medication to see if that helps.  If symptoms are continuing and/or worsening, I would like her to follow-up in clinic

## 2022-04-04 ENCOUNTER — HOSPITAL ENCOUNTER (EMERGENCY)
Facility: MEDICAL CENTER | Age: 27
End: 2022-04-04
Attending: EMERGENCY MEDICINE
Payer: COMMERCIAL

## 2022-04-04 ENCOUNTER — TELEMEDICINE (OUTPATIENT)
Dept: MEDICAL GROUP | Facility: LAB | Age: 27
End: 2022-04-04
Payer: COMMERCIAL

## 2022-04-04 VITALS — HEIGHT: 64 IN | BODY MASS INDEX: 29.01 KG/M2

## 2022-04-04 VITALS
HEART RATE: 78 BPM | TEMPERATURE: 98 F | OXYGEN SATURATION: 100 % | BODY MASS INDEX: 32.05 KG/M2 | DIASTOLIC BLOOD PRESSURE: 78 MMHG | HEIGHT: 61 IN | SYSTOLIC BLOOD PRESSURE: 116 MMHG | RESPIRATION RATE: 16 BRPM | WEIGHT: 169.75 LBS

## 2022-04-04 DIAGNOSIS — R51.9 NONINTRACTABLE HEADACHE, UNSPECIFIED CHRONICITY PATTERN, UNSPECIFIED HEADACHE TYPE: ICD-10-CM

## 2022-04-04 DIAGNOSIS — G43.909 MIGRAINE WITHOUT STATUS MIGRAINOSUS, NOT INTRACTABLE, UNSPECIFIED MIGRAINE TYPE: ICD-10-CM

## 2022-04-04 LAB
ALBUMIN SERPL BCP-MCNC: 4.7 G/DL (ref 3.2–4.9)
ALBUMIN/GLOB SERPL: 1.4 G/DL
ALP SERPL-CCNC: 78 U/L (ref 30–99)
ALT SERPL-CCNC: 15 U/L (ref 2–50)
ANION GAP SERPL CALC-SCNC: 16 MMOL/L (ref 7–16)
AST SERPL-CCNC: 19 U/L (ref 12–45)
BILIRUB SERPL-MCNC: 0.2 MG/DL (ref 0.1–1.5)
BUN SERPL-MCNC: 10 MG/DL (ref 8–22)
CALCIUM SERPL-MCNC: 9.7 MG/DL (ref 8.4–10.2)
CHLORIDE SERPL-SCNC: 104 MMOL/L (ref 96–112)
CO2 SERPL-SCNC: 20 MMOL/L (ref 20–33)
CREAT SERPL-MCNC: 0.63 MG/DL (ref 0.5–1.4)
GFR SERPLBLD CREATININE-BSD FMLA CKD-EPI: 124 ML/MIN/1.73 M 2
GLOBULIN SER CALC-MCNC: 3.4 G/DL (ref 1.9–3.5)
GLUCOSE SERPL-MCNC: 82 MG/DL (ref 65–99)
POTASSIUM SERPL-SCNC: 4 MMOL/L (ref 3.6–5.5)
PROT SERPL-MCNC: 8.1 G/DL (ref 6–8.2)
SODIUM SERPL-SCNC: 140 MMOL/L (ref 135–145)

## 2022-04-04 PROCEDURE — 80053 COMPREHEN METABOLIC PANEL: CPT

## 2022-04-04 PROCEDURE — 700111 HCHG RX REV CODE 636 W/ 250 OVERRIDE (IP): Performed by: EMERGENCY MEDICINE

## 2022-04-04 PROCEDURE — 700105 HCHG RX REV CODE 258: Performed by: EMERGENCY MEDICINE

## 2022-04-04 PROCEDURE — 36415 COLL VENOUS BLD VENIPUNCTURE: CPT

## 2022-04-04 PROCEDURE — 99213 OFFICE O/P EST LOW 20 MIN: CPT | Mod: 95 | Performed by: NURSE PRACTITIONER

## 2022-04-04 PROCEDURE — 96374 THER/PROPH/DIAG INJ IV PUSH: CPT

## 2022-04-04 PROCEDURE — 99284 EMERGENCY DEPT VISIT MOD MDM: CPT

## 2022-04-04 PROCEDURE — 96375 TX/PRO/DX INJ NEW DRUG ADDON: CPT

## 2022-04-04 RX ORDER — KETOROLAC TROMETHAMINE 30 MG/ML
15 INJECTION, SOLUTION INTRAMUSCULAR; INTRAVENOUS ONCE
Status: COMPLETED | OUTPATIENT
Start: 2022-04-04 | End: 2022-04-04

## 2022-04-04 RX ORDER — SODIUM CHLORIDE, SODIUM LACTATE, POTASSIUM CHLORIDE, CALCIUM CHLORIDE 600; 310; 30; 20 MG/100ML; MG/100ML; MG/100ML; MG/100ML
1000 INJECTION, SOLUTION INTRAVENOUS ONCE
Status: COMPLETED | OUTPATIENT
Start: 2022-04-04 | End: 2022-04-04

## 2022-04-04 RX ORDER — BUTALBITAL, ACETAMINOPHEN AND CAFFEINE 50; 325; 40 MG/1; MG/1; MG/1
1 TABLET ORAL EVERY 6 HOURS PRN
Qty: 20 TABLET | Refills: 0 | Status: SHIPPED | OUTPATIENT
Start: 2022-04-04 | End: 2022-04-09

## 2022-04-04 RX ORDER — SUMATRIPTAN 50 MG/1
50 TABLET, FILM COATED ORAL
Qty: 10 TABLET | Refills: 0 | Status: SHIPPED | OUTPATIENT
Start: 2022-04-04 | End: 2023-06-06

## 2022-04-04 RX ORDER — DIPHENHYDRAMINE HYDROCHLORIDE 50 MG/ML
25 INJECTION INTRAMUSCULAR; INTRAVENOUS ONCE
Status: COMPLETED | OUTPATIENT
Start: 2022-04-04 | End: 2022-04-04

## 2022-04-04 RX ORDER — METOCLOPRAMIDE HYDROCHLORIDE 5 MG/ML
10 INJECTION INTRAMUSCULAR; INTRAVENOUS ONCE
Status: COMPLETED | OUTPATIENT
Start: 2022-04-04 | End: 2022-04-04

## 2022-04-04 RX ADMIN — METOCLOPRAMIDE 10 MG: 5 INJECTION, SOLUTION INTRAMUSCULAR; INTRAVENOUS at 19:15

## 2022-04-04 RX ADMIN — SODIUM CHLORIDE, POTASSIUM CHLORIDE, SODIUM LACTATE AND CALCIUM CHLORIDE 1000 ML: 600; 310; 30; 20 INJECTION, SOLUTION INTRAVENOUS at 19:17

## 2022-04-04 RX ADMIN — KETOROLAC TROMETHAMINE 15 MG: 30 INJECTION, SOLUTION INTRAMUSCULAR at 19:21

## 2022-04-04 RX ADMIN — DIPHENHYDRAMINE HYDROCHLORIDE 25 MG: 50 INJECTION INTRAMUSCULAR; INTRAVENOUS at 19:18

## 2022-04-04 ASSESSMENT — FIBROSIS 4 INDEX: FIB4 SCORE: 0.31

## 2022-04-04 NOTE — PROGRESS NOTES
"Virtual Visit: Established Patient   This visit was conducted via Zoom using secure and encrypted videoconferencing technology.   The patient was in their home in the [facility].    The patient's identity was confirmed and verbal consent was obtained for this virtual visit.     Subjective:   CC:   Chief Complaint   Patient presents with   • Vertigo   • Migraine     X 2 months getting worse , nausea         Sadia Troncoso is a 26 y.o. female presenting for evaluation and management of:    Post concussive syndrome  Onset 2 months ago. She reports left sided pain and some vertigo. She feels like it is getting worse and worse. She is also experiencing nausea and vomiting. She describes that last night she felt like her headache \"hit her\" and \"felt like an ice pick.\" She has been taking Zofran. She called nursing triage and was told to go to the ER because it was thought that she might be having a thunderclap headache. She did not end up going to the ER. She has been taking OTC migraine medication without relief. She does not have a history of migraines or headaches.   She does have a history of seizures, but stopped taking the medication about 4 months ago. She has not had a seizure in 10 months.   Last head CT was about 2 months ago and was normal. She was seen at the ER because she split her head open and needed staples. She reports that she fell, but the cause was unknown.   Denies weakness, congestion, ear pain, numbness, facial dropping.     ROS   As documented in history of present illness above     Objective:   Ht 1.626 m (5' 4\")   BMI 29.01 kg/m²     Physical Exam:  Constitutional: Alert, no distress, well-groomed.  Skin: No rashes in visible areas.  Eye: Round. Conjunctiva clear, lids normal. No icterus.   ENMT: Lips pink without lesions, good dentition, moist mucous membranes. Phonation normal.  Neck: No masses, no thyromegaly. Moves freely without pain.  Respiratory: Unlabored respiratory effort, no " cough or audible wheeze  Psych: Alert and oriented x3, normal affect and mood.     Assessment and Plan:   The following treatment plan was discussed:     1. Migraine without status migrainosus, not intractable, unspecified migraine type  Discussed symptoms of post-concussive syndrome. Patient not able to be adequately assessed due to virtual visit. Patient to take medication as prescribed. Supportive care, differential diagnoses, and indications for immediate follow-up discussed with patient. Pathogenesis of diagnosis discussed including typical length and natural progression. Instructed to return to clinic or nearest emergency department for any change in condition, further concerns, or worsening of symptoms.  - SUMAtriptan (IMITREX) 50 MG Tab; Take 1 Tablet by mouth one time as needed for Migraine for up to 1 dose.  Dispense: 10 Tablet; Refill: 0

## 2022-04-05 ENCOUNTER — APPOINTMENT (OUTPATIENT)
Dept: RADIOLOGY | Facility: MEDICAL CENTER | Age: 27
End: 2022-04-05
Attending: EMERGENCY MEDICINE
Payer: COMMERCIAL

## 2022-04-05 ENCOUNTER — HOSPITAL ENCOUNTER (EMERGENCY)
Facility: MEDICAL CENTER | Age: 27
End: 2022-04-05
Attending: EMERGENCY MEDICINE
Payer: COMMERCIAL

## 2022-04-05 VITALS
HEIGHT: 64 IN | SYSTOLIC BLOOD PRESSURE: 104 MMHG | RESPIRATION RATE: 18 BRPM | BODY MASS INDEX: 29.88 KG/M2 | WEIGHT: 175.04 LBS | HEART RATE: 100 BPM | DIASTOLIC BLOOD PRESSURE: 64 MMHG | OXYGEN SATURATION: 99 % | TEMPERATURE: 96.8 F

## 2022-04-05 DIAGNOSIS — G43.809 OTHER MIGRAINE WITHOUT STATUS MIGRAINOSUS, NOT INTRACTABLE: ICD-10-CM

## 2022-04-05 DIAGNOSIS — R42 VERTIGO: ICD-10-CM

## 2022-04-05 DIAGNOSIS — R11.0 NAUSEA: ICD-10-CM

## 2022-04-05 PROCEDURE — 70450 CT HEAD/BRAIN W/O DYE: CPT

## 2022-04-05 PROCEDURE — A9270 NON-COVERED ITEM OR SERVICE: HCPCS | Performed by: EMERGENCY MEDICINE

## 2022-04-05 PROCEDURE — 700111 HCHG RX REV CODE 636 W/ 250 OVERRIDE (IP): Performed by: EMERGENCY MEDICINE

## 2022-04-05 PROCEDURE — 99284 EMERGENCY DEPT VISIT MOD MDM: CPT

## 2022-04-05 PROCEDURE — 700102 HCHG RX REV CODE 250 W/ 637 OVERRIDE(OP): Performed by: EMERGENCY MEDICINE

## 2022-04-05 PROCEDURE — 96375 TX/PRO/DX INJ NEW DRUG ADDON: CPT

## 2022-04-05 PROCEDURE — 96374 THER/PROPH/DIAG INJ IV PUSH: CPT

## 2022-04-05 PROCEDURE — 96372 THER/PROPH/DIAG INJ SC/IM: CPT

## 2022-04-05 RX ORDER — DEXAMETHASONE SODIUM PHOSPHATE 4 MG/ML
10 INJECTION, SOLUTION INTRA-ARTICULAR; INTRALESIONAL; INTRAMUSCULAR; INTRAVENOUS; SOFT TISSUE ONCE
Status: COMPLETED | OUTPATIENT
Start: 2022-04-05 | End: 2022-04-05

## 2022-04-05 RX ORDER — ONDANSETRON 2 MG/ML
4 INJECTION INTRAMUSCULAR; INTRAVENOUS ONCE
Status: COMPLETED | OUTPATIENT
Start: 2022-04-05 | End: 2022-04-05

## 2022-04-05 RX ORDER — SUMATRIPTAN 6 MG/.5ML
6 INJECTION, SOLUTION SUBCUTANEOUS ONCE
Status: COMPLETED | OUTPATIENT
Start: 2022-04-05 | End: 2022-04-05

## 2022-04-05 RX ORDER — KETOROLAC TROMETHAMINE 30 MG/ML
30 INJECTION, SOLUTION INTRAMUSCULAR; INTRAVENOUS ONCE
Status: COMPLETED | OUTPATIENT
Start: 2022-04-05 | End: 2022-04-05

## 2022-04-05 RX ORDER — MECLIZINE HYDROCHLORIDE 25 MG/1
25 TABLET ORAL ONCE
Status: COMPLETED | OUTPATIENT
Start: 2022-04-05 | End: 2022-04-05

## 2022-04-05 RX ORDER — DIPHENHYDRAMINE HYDROCHLORIDE 50 MG/ML
50 INJECTION INTRAMUSCULAR; INTRAVENOUS ONCE
Status: COMPLETED | OUTPATIENT
Start: 2022-04-05 | End: 2022-04-05

## 2022-04-05 RX ORDER — LORAZEPAM 2 MG/ML
1 INJECTION INTRAMUSCULAR ONCE
Status: COMPLETED | OUTPATIENT
Start: 2022-04-05 | End: 2022-04-05

## 2022-04-05 RX ADMIN — KETOROLAC TROMETHAMINE 30 MG: 30 INJECTION, SOLUTION INTRAMUSCULAR at 08:06

## 2022-04-05 RX ADMIN — DIPHENHYDRAMINE HYDROCHLORIDE 50 MG: 50 INJECTION INTRAMUSCULAR; INTRAVENOUS at 08:22

## 2022-04-05 RX ADMIN — FENTANYL CITRATE 100 MCG: 50 INJECTION, SOLUTION INTRAMUSCULAR; INTRAVENOUS at 09:37

## 2022-04-05 RX ADMIN — LORAZEPAM 1 MG: 2 INJECTION INTRAMUSCULAR; INTRAVENOUS at 09:37

## 2022-04-05 RX ADMIN — MECLIZINE HYDROCHLORIDE 25 MG: 25 TABLET ORAL at 08:06

## 2022-04-05 RX ADMIN — ONDANSETRON 4 MG: 2 INJECTION INTRAMUSCULAR; INTRAVENOUS at 08:06

## 2022-04-05 RX ADMIN — DEXAMETHASONE SODIUM PHOSPHATE 10 MG: 4 INJECTION, SOLUTION INTRA-ARTICULAR; INTRALESIONAL; INTRAMUSCULAR; INTRAVENOUS; SOFT TISSUE at 08:07

## 2022-04-05 RX ADMIN — SUMATRIPTAN 6 MG: 6 INJECTION, SOLUTION SUBCUTANEOUS at 08:05

## 2022-04-05 ASSESSMENT — FIBROSIS 4 INDEX: FIB4 SCORE: 0.34

## 2022-04-05 ASSESSMENT — ENCOUNTER SYMPTOMS
PHOTOPHOBIA: 1
SHORTNESS OF BREATH: 0
HEADACHES: 1
DIZZINESS: 1
FOCAL WEAKNESS: 0
CHILLS: 0
FEVER: 0
NAUSEA: 1
SENSORY CHANGE: 0
VOMITING: 1

## 2022-04-05 NOTE — ED PROVIDER NOTES
"ED Provider Note  Primary care provider: Joan King P.A.-C.  Means of arrival: private vehicle  History obtained from: patient  History limited by: none    CHIEF COMPLAINT  Chief Complaint   Patient presents with   • Headache     Pt walk-in with child. Pt c/o headache which has not resolved. Pt here yesterday for same complaint. Pt has tried aleve, ibuprofen, tylenol, caffeine, imitrex, toradol. Pt states pain subsided yesterday after ED visit but now \"feels like an ice pick to my head.\"       HPI  Sadia Troncoso is a 26 y.o. female who presents to the Emergency Department for evaluation of headache.  Patient reports that a couple of months ago she had a head injury and since then has been having persistent headaches.  She was seen in the emergency department yesterday for her headache after which she felt improved.  She was subsequently discharged and advised to follow-up with neurology which she does plan to do for evaluation of her continued headaches.  Patient states that although she felt that improved last night she woke up this morning with another headache.  She describes it as an ice pick stabbing pain to her left temporal region that is severe.  She has associated photophobia, vertigo, nausea.  The pain is currently 8 out of 10 in severity.  Patient has had 2 CT scans of her head since the incident occurred but is very concerned that the headaches are persistent and she may have new acute intracranial pathology.  Her last head CT was 2/8/2022 and was unremarkable.  Initial injury was on 2/5/2022, at which time initial head CT was negative.  Patient denies numbness, tingling or weakness.    REVIEW OF SYSTEMS  Review of Systems   Constitutional: Negative for chills and fever.   Eyes: Positive for photophobia.   Respiratory: Negative for shortness of breath.    Cardiovascular: Negative for chest pain.   Gastrointestinal: Positive for nausea and vomiting.   Neurological: Positive for dizziness " "and headaches. Negative for sensory change and focal weakness.   All other systems reviewed and are negative.        PAST MEDICAL HISTORY   has a past medical history of Anxiety, Depression, Headache, Liver disease, and Seizure disorder (HCC).    SURGICAL HISTORY   has a past surgical history that includes appendectomy.    SOCIAL HISTORY  Social History     Tobacco Use   • Smoking status: Former Smoker     Packs/day: 0.25     Years: 2.00     Pack years: 0.50     Types: Cigarettes   • Smokeless tobacco: Never Used   Vaping Use   • Vaping Use: Never used   Substance Use Topics   • Alcohol use: Not Currently     Alcohol/week: 1.8 oz     Types: 3 Cans of beer per week     Comment: 3 alcoholic seltzers 1 day a week   • Drug use: Not Currently     Types: Inhaled     Comment: THC CBD      Social History     Substance and Sexual Activity   Drug Use Not Currently   • Types: Inhaled    Comment: THC CBD       FAMILY HISTORY  Family History   Problem Relation Age of Onset   • Seizures Maternal Grandmother    • Colorectal Cancer Maternal Grandmother    • Seizures Maternal Grandfather    • Colorectal Cancer Maternal Grandfather    • Seizures Paternal Grandmother    • Seizures Paternal Grandfather    • Lung Cancer Daughter        CURRENT MEDICATIONS  Home Medications     Reviewed by Zoey Michel R.N. (Registered Nurse) on 04/05/22 at 0644  Med List Status: <None>   Medication Last Dose Status   butalbital/apap/caffeine (FIORICET) -40 mg Tab  Active   SUMAtriptan (IMITREX) 50 MG Tab  Active                ALLERGIES  No Known Allergies    PHYSICAL EXAM  VITAL SIGNS: /72   Pulse 73   Temp 36 °C (96.8 °F) (Temporal)   Resp 18   Ht 1.626 m (5' 4\")   Wt 79.4 kg (175 lb 0.7 oz)   LMP 03/28/2022 (Exact Date)   SpO2 98%   Breastfeeding No   BMI 30.05 kg/m²   Vitals reviewed by myself.  Physical Exam  Nursing note and vitals reviewed.  Constitutional: Well-developed and well-nourished. No distress.   HENT: Head is " normocephalic and atraumatic. Oropharynx is clear and moist without exudate or erythema.   Eyes: Pupils are equal, round, and reactive to light. No horizontal or vertical nystagmus. Conjunctiva are normal.   Cardiovascular: Normal rate and regular rhythm. No murmur heard. Normal radial pulses.  Pulmonary/Chest: Breath sounds normal. No wheezes or rales.   Abdominal: Soft and non-tender. No distention.    Musculoskeletal: Extremities exhibit normal range of motion without edema or tenderness. Patient ambulates with a normal narrow-based steady gait.   Neurological: Awake, alert and oriented to person, place, and time. No focal deficits noted. Cranial nerves II - XII intact. No pronator drift.  No dysmetria on cerebellar testing. Normal speech and language. Normal strength and sensation in bilateral upper and lower extremities.   Skin: Skin is warm and dry. No rash.   Psychiatric: Normal mood and affect. Appropriate for clinical situation.      DIAGNOSTIC STUDIES /  LABS  Labs Reviewed   HCG QUALITATIVE UR       All labs reviewed by me.    RADIOLOGY  CT-HEAD W/O   Final Result         1.  No acute intracranial abnormality.           The radiologist's interpretation of all radiological studies have been reviewed by me.        COURSE & MEDICAL DECISION MAKING  Nursing notes, VS, PMSFHx reviewed in chart.    Patient is a 26-year-old female who comes in for evaluation of headache.  Differential diagnosis includes posttraumatic headache, concussion, tension headache, migraine headache.  I feel acute intracranial pathology is less likely given recent CTs which have been negative however given persistent severe symptoms I will repeat a CT scan to assess for acute pathology.    Patient's initial vitals are within normal limits and she is neurovascularly intact on exam.  Patient is treated with meclizine, Toradol, dexamethasone, sumatriptan, Benadryl and Zofran with some mild improvement in her symptoms.  CT returns and  demonstrates no acute findings.  As she is still symptomatic she is further treated with fentanyl and Ativan.  After this treatment she is feeling greatly improved.  She is able to tolerate oral intake.  Therefore she is reassured and advised to follow-up with PCP and neurology for persistent headaches.  She is given strict return precautions and discharged in stable condition.      FINAL IMPRESSION  1. Other migraine without status migrainosus, not intractable    2. Nausea    3. Vertigo

## 2022-04-05 NOTE — ED PROVIDER NOTES
ED Provider Note    ER PROVIDER NOTE          CHIEF COMPLAINT  Chief Complaint   Patient presents with   • Headache     LT sided since 3pm yesterday Frequent H/A over past month  Sometimes associated with vertigo  N/V Emesis  X  3  No fever or recent trauma  Tried imetrex and zofran Newly prescribed by PMD and no relief       HPI  Sadia Troncoso is a 26 y.o. female who presents to the emergency department complaining of headache.  Patient has had frequent headaches over the last 2 months after a syncopal event and head injury.  She reports this episode began gradually at 3:00 yesterday and has been gradually worsening since.  She has tried over-the-counter medications as well as took a dose of Imitrex from primary care today without any relief.  Headache is constant, it is left-sided, it is not positional.  She has no neck pain or stiffness.  No fevers or chills.  She does report several episodes of nonbloody emesis as well.  She reports no focal weakness numbness or tingling.  No visual symptoms.  She does state that when she gets these headaches she does get some vertigo as well since her injury 2 months ago.    REVIEW OF SYSTEMS  Pertinent positives include headache. Pertinent negatives include no fever. See HPI for details. All other systems reviewed and are negative.    PAST MEDICAL HISTORY   has a past medical history of Anxiety, Depression, Liver disease, and Seizure disorder (HCC).    SURGICAL HISTORY   has a past surgical history that includes appendectomy.    FAMILY HISTORY  Family History   Problem Relation Age of Onset   • Seizures Maternal Grandmother    • Colorectal Cancer Maternal Grandmother    • Seizures Maternal Grandfather    • Colorectal Cancer Maternal Grandfather    • Seizures Paternal Grandmother    • Seizures Paternal Grandfather    • Lung Cancer Daughter        SOCIAL HISTORY  Social History     Socioeconomic History   • Marital status:    • Number of children: 2   Occupational  "History   • Occupation:      Employer: [employer]   Tobacco Use   • Smoking status: Former Smoker     Packs/day: 0.25     Years: 2.00     Pack years: 0.50     Types: Cigarettes   • Smokeless tobacco: Never Used   Vaping Use   • Vaping Use: Never used   Substance and Sexual Activity   • Alcohol use: Not Currently     Alcohol/week: 1.8 oz     Types: 3 Cans of beer per week     Comment: 3 alcoholic seltzers 1 day a week   • Drug use: Yes     Types: Inhaled     Comment: THC CBD      Social History     Substance and Sexual Activity   Drug Use Yes   • Types: Inhaled    Comment: THC CBD       CURRENT MEDICATIONS  Home Medications    **Home medications have not yet been reviewed for this encounter**         ALLERGIES  No Known Allergies    PHYSICAL EXAM  VITAL SIGNS: /78   Pulse 78   Temp 36.7 °C (98 °F) (Oral)   Resp 16   Ht 1.549 m (5' 1\")   Wt 77 kg (169 lb 12.1 oz)   LMP 03/28/2022 (Exact Date)   SpO2 100%   Breastfeeding No   BMI 32.07 kg/m²   Pulse ox interpretation: I interpret this pulse ox as normal.    Constitutional: Alert in no apparent distress.  HENT: No signs of trauma, Bilateral external ears normal, Nose normal.   Eyes: Pupils are equal and reactive, Conjunctiva normal, Non-icteric.   Neck: Normal range of motion, No tenderness, Supple, No stridor.   Cardiovascular: Regular rate and rhythm, no murmurs.   Thorax & Lungs: Normal breath sounds, No respiratory distress, No wheezing, No chest tenderness.   Abdomen: Bowel sounds normal, Soft, No tenderness, No masses, No pulsatile masses. No peritoneal signs.  Skin: Warm, Dry, No erythema, No rash.   Back: No bony tenderness, No CVA tenderness.   Extremities: Intact distal pulses, No edema, No tenderness, No cyanosis, Negative Makenna's sign.  Musculoskeletal: Good range of motion in all major joints. No tenderness to palpation or major deformities noted.   Neurologic: Alert, cranial nerves intact, speech is appropriate or not " slurred, upper extremities bilaterally exhibit no drift, no dysmetria, 5 out of 5 strength with bilateral bicep/tricep/, sensation intact to light touch throughout upper extremities. Lower extremities strength 5 out of 5 thigh extension/flexion/abduction/adduction, knee extension/flexion, dorsiflexion plantar flexion. No clonus.  2+ patella reflexes.  sensation intact to light touch.  No focal deficits noted. Ambulates with steady gait  Psychiatric: Affect normal, Judgment normal, Mood normal.       DIAGNOSTIC STUDIES / PROCEDURES        LABS  Labs Reviewed   COMP METABOLIC PANEL   ESTIMATED GFR       All labs reviewed by me.    RADIOLOGY  No orders to display     The radiologist's interpretation of all radiological studies have been reviewed and images independently viewed by me.    COURSE & MEDICAL DECISION MAKING  Nursing notes, VS, PMSFHx reviewed in chart.    6:51 PM Patient seen and examined at bedside. Patient will be treated with IV fluid, Toradol, Reglan, Benadryl. Ordered for CMP to evaluate her symptoms.     8:53 PM  Patient is reevaluated, she is much better, headache is resolved, will plan for discharge    HYDRATION: Based on the patient's presentation of Acute Vomiting the patient was given IV fluids. IV Hydration was used because oral hydration was not as rapid as required. Upon recheck following hydration, the patient was improved.    Decision Making:  This is a 26 y.o. female presented with headache.  Patient has had recurrent headache over the last few months after a head injury.  She did improve here after migraine cocktail.  She has had some intermittent vertigo as well consider postconcussive symptoms and will be referred to neurology headache clinic with her recurrent headaches as well, provide a short course of Fioricet also.  Migraine headache is also considered and a potential.  The lack of rapid onset of the headache, in addition to the well appearance of the pt makes the dx of  subarchnoid hemorrhage less likely. The normal vital signs, lack of a temperature and lack of meningeal signs (supple neck without pain with rom) makes the dx of meningitis less likely.   The patient has no neurologic signs, no fever, and is immunocompetent therefore the time course would be inconsistent with abscess.  Patient had prior CT x2 after traumatic injury that were negative traumatic bleeding seems exceedingly unlikely toxic and metabolic causes of headache are also unlikely given no other signs or symptoms of such a disorder.    The patient is improved with normal vitals, a normal neurologic exam, normal gait, and is discharged home with pcp follow-up and return precautions.    I reviewed prescription monitoring program for patient's narcotic use before prescribing a scheduled drug.The patient will not drink alcohol nor drive with prescribed medications. The patient will return for new or worsening symptoms and is stable at the time of discharge.    The patient is referred to a primary physician for blood pressure management, diabetic screening, and for all other preventative health concerns.    DISPOSITION:  Patient will be discharged home in stable condition.    FOLLOW UP:  Joan King, P.A.-C.  [id number] [id number] [phone]          [facility]- NEUROLOGY  [address]  [facility] [address]  [phone]  Schedule an appointment as soon as possible for a visit         OUTPATIENT MEDICATIONS:  Discharge Medication List as of 4/4/2022  8:48 PM            FINAL IMPRESSION  1. Nonintractable headache, unspecified chronicity pattern, unspecified headache type          The note accurately reflects work and decisions made by me.  Nael Quinn M.D.  4/4/2022  8:55 PM

## 2022-04-05 NOTE — ED TRIAGE NOTES
"Chief Complaint   Patient presents with   • Headache     Pt walk-in with child. Pt c/o headache which has not resolved. Pt here yesterday for same complaint. Pt has tried aleve, ibuprofen, tylenol, caffeine, imitrex, toradol. Pt states pain subsided yesterday after ED visit but now \"feels like an ice pick to my head.\"     /72   Pulse 73   Temp 36 °C (96.8 °F) (Temporal)   Resp 18   Ht 1.626 m (5' 4\")   Wt 79.4 kg (175 lb 0.7 oz)   SpO2 98% RA    Has this patient been vaccinated for COVID YES  "

## 2022-04-20 ENCOUNTER — PATIENT MESSAGE (OUTPATIENT)
Dept: MEDICAL GROUP | Facility: LAB | Age: 27
End: 2022-04-20
Payer: COMMERCIAL

## 2022-04-20 DIAGNOSIS — F41.9 ANXIETY: ICD-10-CM

## 2022-04-20 RX ORDER — CLONAZEPAM 1 MG/1
1 TABLET ORAL DAILY
Qty: 30 TABLET | Refills: 0 | Status: SHIPPED | OUTPATIENT
Start: 2022-04-20 | End: 2022-05-20

## 2022-04-20 NOTE — PATIENT COMMUNICATION
Pt wants you to know that she never picked up the March Rx.  She still had some from the previous Rx.

## 2022-04-20 NOTE — TELEPHONE ENCOUNTER
From: Sadia Troncoso  To: Physician Assistant Joan King  Sent: 4/20/2022 11:04 AM PDT  Subject: Prescription Closed Out    I’m at [facility] to  the Clozepam you prescribed me and they said you closed it out. Could I get it sent back to them please?

## 2022-06-30 ENCOUNTER — TELEMEDICINE (OUTPATIENT)
Dept: MEDICAL GROUP | Facility: LAB | Age: 27
End: 2022-06-30
Payer: COMMERCIAL

## 2022-06-30 DIAGNOSIS — F41.9 ANXIETY: Primary | ICD-10-CM

## 2022-06-30 DIAGNOSIS — R42 VERTIGO: ICD-10-CM

## 2022-06-30 PROBLEM — Z20.822 CLOSE EXPOSURE TO COVID-19 VIRUS: Status: RESOLVED | Noted: 2022-01-20 | Resolved: 2022-06-30

## 2022-06-30 PROCEDURE — 99214 OFFICE O/P EST MOD 30 MIN: CPT | Mod: 95 | Performed by: PHYSICIAN ASSISTANT

## 2022-06-30 RX ORDER — CLONAZEPAM 0.5 MG/1
0.5 TABLET ORAL 2 TIMES DAILY PRN
Qty: 60 TABLET | Refills: 0 | Status: SHIPPED | OUTPATIENT
Start: 2022-06-30 | End: 2022-07-30

## 2022-06-30 NOTE — PROGRESS NOTES
"This evaluation was conducted via Zoom using secure and encrypted videoconferencing technology. The patient was in their home in the [facility].    The patient's identity was confirmed and verbal consent was obtained for this virtual visit.    Subjective:     CC: Anxiety and Vertigo   Sadia Troncoso is a 26 y.o. female presenting to discuss the evaluation and management of anxiety and vertigo     Anxiety  Follow up;   Working out 6 days/weeks - losing weight.   Generally feeling better.   \"Going through a rough divorce and lost job 2 days ago.\"  She will be going to live with her family.     Has been able to work through anxiety, does see a therapist.   Feels like she would need a medication only as needed.   Has used Trazodone in the past.   Used Klonopin in the past which seemed to help her.     Vertigo  New to me; ongoing for the past few months.   Feels like the vertigo is only on the left/looking to the left.       ROS  See HPI  Constitutional: Negative for fever, chills and malaise/fatigue.   Respiratory: Negative for cough and shortness of breath.    Cardiovascular: Negative for leg swelling.   Skin: Negative for rash.   Psychiatric/Behavioral: +depression/anxiety     No Known Allergies    Current medicines (including changes today)  Current Outpatient Medications   Medication Sig Dispense Refill   • clonazePAM (KLONOPIN) 0.5 MG Tab Take 1 Tablet by mouth 2 times a day as needed (anxiety and insomnia) for up to 30 days. 60 Tablet 0   • SUMAtriptan (IMITREX) 50 MG Tab Take 1 Tablet by mouth one time as needed for Migraine for up to 1 dose. 10 Tablet 0     No current facility-administered medications for this visit.       She  has a past medical history of Anxiety, Depression, Headache, Liver disease, and Seizure disorder (HCC).  She  has a past surgical history that includes appendectomy.      Family History   Problem Relation Age of Onset   • Seizures Maternal Grandmother    • Colorectal Cancer " Maternal Grandmother    • Seizures Maternal Grandfather    • Colorectal Cancer Maternal Grandfather    • Seizures Paternal Grandmother    • Seizures Paternal Grandfather    • Lung Cancer Daughter      Family Status   Relation Name Status   • MGMo     • MGFa     • PGMo  (Not Specified)   • PGFa  (Not Specified)   • Edilson         Patient Active Problem List    Diagnosis Date Noted   • Vertigo 2022   • Anxiety 2022   • Hemangioma of liver 2022   • Liver disease 2022   • Severe recurrent major depression (HCC) 2021   • Chronic fatigue 10/01/2020   • Hypokalemia 2020   • Anemia 2020   • Psychiatric pseudoseizure 2020   • Lethargy 2020   • Intractable vomiting with nausea 2020   • Loss of sensation 2020   • Weakness 2020   • Migraine 2020   • Lumbar radiculopathy 2018   • Acute post-hemorrhagic anemia 09/10/2018          Objective:   There were no vitals taken for this visit.    Physical Exam:  Constitutional: Alert, no distress, well-groomed.  Skin: No rashes in visible areas.  Eye: Round. Conjunctiva clear, lids normal. No icterus.   ENMT: Lips pink without lesions, good dentition, moist mucous membranes. Phonation normal.  Neck: No masses, no thyromegaly. Moves freely without pain.  Respiratory: Unlabored respiratory effort, no cough or audible wheeze  Psych: Alert and oriented x3, normal affect and mood.       Assessment and Plan:   The following treatment plan was discussed:     1. Anxiety  Follow up; ongoing anxiety acutely worsening due to circumstances with divorce and recently losing her job.   Anxiety is worse at night. Failed Trazodone in the past.   Agreeable to using Klonopin at 0.5mg up to BID.   Pt was educated on use and SEs of medication.  Discussed that this is not a long term solution.   Continue care with therapist.   F/u in 3-4 weeks; sooner if needed   - clonazePAM (KLONOPIN) 0.5 MG Tab; Take 1  Tablet by mouth 2 times a day as needed (anxiety and insomnia) for up to 30 days.  Dispense: 60 Tablet; Refill: 0    2. Vertigo  New to me; worsening over the past few months.  Recommend that she do a trial of an over-the-counter antihistamine once daily over the next 2 weeks.  I also recommend that she have an evaluation by physical therapy for vestibular treatment.  Continue to monitor symptoms, follow-up with me in clinic if no improvement with the above therapy.  - Referral to Physical Therapy        Follow-up: Return if symptoms worsen or fail to improve.    JOE CarusoATRAN.  Supervising MD: Dr. David Gonzalez MD  06/30/22

## 2022-06-30 NOTE — ASSESSMENT & PLAN NOTE
"Follow up;   Working out 6 days/weeks - losing weight.   Generally feeling better.   \"Going through a rough divorce and lost job 2 days ago.\"  She will be going to live with her family.     Has been able to work through anxiety, does see a therapist.   Feels like she would need a medication only as needed.   Has used Trazodone in the past.   Used Klonopin in the past which seemed to help her.   "

## 2022-06-30 NOTE — ASSESSMENT & PLAN NOTE
New to me; ongoing for the past few months.   Feels like the vertigo is only on the left/looking to the left.

## 2022-07-20 ENCOUNTER — TELEPHONE (OUTPATIENT)
Dept: MEDICAL GROUP | Facility: LAB | Age: 27
End: 2022-07-20
Payer: COMMERCIAL

## 2022-07-20 NOTE — TELEPHONE ENCOUNTER
Patient called about her clonazepam, she has been taking it at night to help her sleep and in the morning as needed. She has been taking this frequently for the past 5 d and has been having increased mood changes and recently the urge to hurt.    Let me know if the patient needs to follow up in office or stop the medication.

## 2022-09-07 ENCOUNTER — TELEPHONE (OUTPATIENT)
Dept: CARDIOTHORACIC SURGERY | Facility: MEDICAL CENTER | Age: 27
End: 2022-09-07
Payer: COMMERCIAL

## 2022-09-07 NOTE — TELEPHONE ENCOUNTER
1448 09/07/22  Attempted to contact pt as she is scheduled for an appt with 09/27/22 with Dr. Aldrich, however Dr. Aldrich's last day in clinic is 09/13/22. LVM informing the pt to call back. Pt needs to be scheduled with Dr. Velarde for 10/11/22, and the pt needs to complete her scan.   Malick MARTINES

## 2022-09-26 ENCOUNTER — TELEPHONE (OUTPATIENT)
Dept: SURGICAL ONCOLOGY | Facility: MEDICAL CENTER | Age: 27
End: 2022-09-26
Payer: COMMERCIAL

## 2022-09-26 NOTE — TELEPHONE ENCOUNTER
0800 09/26/22  Attempted to contact pt to ask if she got her scans done somewhere else. LVM asking the pt about her scans and informing her that if she had not got them done her appt for tomorrow with Dr. Velarde will have to be rescheduled. Asked pt to call back at [phone].  Malick MARTINES

## 2022-10-18 ENCOUNTER — APPOINTMENT (OUTPATIENT)
Dept: RADIOLOGY | Facility: MEDICAL CENTER | Age: 27
End: 2022-10-18
Attending: SURGERY
Payer: COMMERCIAL

## 2022-10-27 ENCOUNTER — TELEPHONE (OUTPATIENT)
Dept: SURGICAL ONCOLOGY | Facility: MEDICAL CENTER | Age: 27
End: 2022-10-27
Payer: COMMERCIAL

## 2022-10-27 NOTE — TELEPHONE ENCOUNTER
0834 10/27/22  Attempted to contact pt regarding their MRI abdomen W/WO that still needs to be completed. LVM for pt to call back.  Malick MARTINES

## 2022-10-31 ENCOUNTER — TELEPHONE (OUTPATIENT)
Dept: SURGICAL ONCOLOGY | Facility: MEDICAL CENTER | Age: 27
End: 2022-10-31
Payer: COMMERCIAL

## 2022-10-31 NOTE — TELEPHONE ENCOUNTER
1448 10/31/22  Attempted to contact pt regarding their imaging as this is the second time we have had to reschedule the pt due to her not getting the scans done. LVM for the pt that the appt will be canceled without the imaging.  Malick MARTINES

## 2022-10-31 NOTE — TELEPHONE ENCOUNTER
0824 10/31/22  Attempted to contact pt regarding their imaging. LVM for pt to call back.  Malick MARTINES

## 2022-11-01 ENCOUNTER — APPOINTMENT (OUTPATIENT)
Dept: SURGICAL ONCOLOGY | Facility: MEDICAL CENTER | Age: 27
End: 2022-11-01
Payer: COMMERCIAL

## 2023-01-30 NOTE — PROGRESS NOTES
Surgical Oncology History and Physical    Date of Evaluation: 3/29/2022    Reason for Referral: Liver Lesion    Referred By: El Salas MD    HPI: Patient is a 25 y/o F with PMHx of anxiety, depression, chronic fatigue, ? Seizure disorder referred to clinic for evaluation of liver lesions.  She has had multiple symptoms which have developed over the last 4 (mostly last 2) years including lethargy, vomiting, possible seizure activity, weigh gain, and abdominal pain.  In August of 2021 she presented with abdominal pain, N/V and a CT was obtained demonstrating no acute abnormality - however small lesions were noted in the liver (<2cm).  On a repeat ER visit 2/2022 for abdominal symptoms a repeat CT was performed again noting liver lesions without other acute intra-abdominal pathology.  This was followed by an MRI and these liver lesions were characterized as consistent with atypical hemangiomas.    Given her persistent GI symptoms she was referred to GI and has been evaluated by Dr. Salas.  His note suggests that a multitude of her symptoms could be related to her reported prior diagnosis of celiac disease and poor compliance with a gluten free diet.  He is planning an EGD (confirm celiac disease) as well as colonoscopy to r/o IBD given SI changes on CT and her symptoms of GI bleeding and abdominal pain.  As she reported significant worry regarding the liver imaging findings she has been referred for surgical evaluation regarding the liver hemangiomas.        Summary of work-up to date:   CT A/P ([facility]) - 4/27/2021: liver normal in contour.  Hepatomegaly.  No intrahepatic biliary dilation. 1.3cm ill-defined intermediate density lesion in the caudate.  1.1cm lesion in the R hepatic lobe.  Gallbladder WNL. Spleen unremarkable.  Pancreas grossly normal.  Kidneys and adrenals unremarkable.  Colon normal with apparent appendectomy previously performed.      CT A/P ([facility]) - 2/11/2022: Trace bilateral pleural  effusions.  Hepatomegaly.  8mm low density lesion in the R hepatic lobe.  1.2cm low density lesion in the R hepatic lobe near the IVC.  Spleen and pancreas unremarkable.  No cholelithiasis.  No biliary dilation.  Subcentimeter bilateral renal cysts.  No bowel obstruction or inflammation.  Small volume free fluid in the L paracolic gutter.  Sclerotic and slightly lytic changes seen in bilateral SI joints.    MRI abdomen with and without contrast ([facility]) - 2/13/2022:  Several low T1 and high T2 signal masses in the liver.  #1: Segment 7 measuring 1cm with focus of peripheral nodular enhancement with internal septations and septal enhancement on more delayed post-contrast imaging.  Enhancement patter parallels the blood pool  #2.  Adjacent to IVC measuring 1.4cm - significant artifact overlying the mass.  Has internal enhancement with is progressive.  Enhancement patter parallels the blood pool.  Signal and enhancement characteristics of these lesions most suggestive of atypical hemangiomas.  Few additional tiny lesions in the R hepatic lobe measuring <5mm.  No intra or extrahepatic biliary dilation.  No cholelithiasis.  Pancreas appears normal without PD dilation.  Small bilateral, simple renal cysts.  Adrenals unremarkable.   No LAD.      Today she states that she is in her usual state of health.  She is tearful as she describes her frustration regarding her health issues over the last 2 years.  States her biggest issue right now is fatigue and lethargy.  She reports that she even has to bring in a chair to the bathroom in order to be able to do her make-up and hair in the morning.  Reports had her EGD and colonoscopy last week with Dr. Salas and has not had a formal discussion with him regarding pathology results (this is pending).  She is additionally scheduled to meet with endocrinology regarding her thyroid US results.  She presents to clinic today with her son (child).    Body mass index is 29.01  kg/m².    ECO    Past Medical History:          Past Medical History:   Diagnosis Date   • Anxiety    • Depression    • Liver disease     liver lesions Dx end    • Seizure disorder (HCC)    -Celiac disease v. Gluten intolerance - not currently on gluten free diet    Past Surgical History:        Past Surgical History:   Procedure Laterality Date   • APPENDECTOMY     -Lap appy    Current Medications:   Current Outpatient Medications on File Prior to Visit   Medication Sig Dispense Refill   • clonazePAM (KLONOPIN) 1 MG Tab Take 1 Tablet by mouth every day for 30 days. 30 Tablet 0     No current facility-administered medications on file prior to visit.             -Anticoagulation:  Denies       Allergies:       No Known Allergies    Family History:          Family History   Problem Relation Age of Onset   • Seizures Maternal Grandmother    • Colorectal Cancer Maternal Grandmother    • Seizures Maternal Grandfather    • Colorectal Cancer Maternal Grandfather    • Seizures Paternal Grandmother    • Seizures Paternal Grandfather    • Lung Cancer Daughter    -Grandfather with lung cancer  -Thyroid cancer in the family  -Colon cancer in maternal grandparents    Social History:       -Tobacco: Prior smoker - quit 9 mths ago      -EtOH: Denies      -IVDU: prior trial of MJ with her neurologist for seizure like symptoms - reports did not help and no longer using.    Review of Systems:  Review of Systems - History obtained from the patient    Review of Systems   Constitutional: Positive for chills and malaise/fatigue. Negative for fever and weight loss.   HENT: Negative for hearing loss and sore throat.    Eyes: Negative for blurred vision, double vision and pain.   Respiratory: Negative for cough, shortness of breath, wheezing and stridor.    Cardiovascular: Negative for chest pain, palpitations and leg swelling.   Gastrointestinal: Positive for abdominal pain, blood in stool and diarrhea. Negative for constipation,  "heartburn, nausea and vomiting.   Genitourinary: Positive for frequency and urgency. Negative for dysuria and hematuria.   Musculoskeletal: Positive for myalgias. Negative for back pain, falls and joint pain.   Skin: Positive for rash. Negative for itching.   Neurological: Positive for dizziness, seizures and weakness. Negative for tremors, speech change, focal weakness and headaches.   Endo/Heme/Allergies: Negative for polydipsia. Bruises/bleeds easily.   Psychiatric/Behavioral: Positive for depression. Negative for hallucinations and memory loss. The patient is nervous/anxious.        All other ROS negative.      Physical Exam:  /68 (BP Location: Right arm, Patient Position: Sitting, BP Cuff Size: Adult)   Pulse 99   Temp 36.5 °C (97.7 °F) (Temporal)   Ht 1.626 m (5' 4\")   Wt 76.7 kg (169 lb)   SpO2 96%   BMI 29.01 kg/m²      -General: Patient is cooperative, appears stated age, in no acute distress, AAOx3        -HEENT:  Head is atraumatic, neck supple, no scleral icterus      -Neck: trachea is midline        -Respiratory:  no increased work of breathing, stable on room air, clear to auscultation bilaterally    -Cardiovascular: normal peripheral perfusion, normal rate, regular , no murmur appreciated    -Abdomen: soft, non-tender to palpation, well healed laparoscopic incisions from appendectomy    -: Deferred        -MSK/Extremities: atraumatic, normal range of motion and strength, ambulatory         -Integumentary: warm, dry, no obvious skin lesions or rashes appreciated, no jaundice           -Neurologic: alert, speech clear and coherent, functional cognition intact            -Psychiatric:  cooperative, appropriate mood and affect          Imaging:   CT A/P ([facility]) - 4/27/2021:   -liver normal in contour.    -Hepatomegaly.    -No intrahepatic biliary dilation.   -1.3cm ill-defined intermediate density lesion in the caudate.   -1.1cm lesion in the R hepatic lobe.    -Gallbladder WNL.   -Spleen " unremarkable.    -Pancreas grossly normal.    -Kidneys and adrenals unremarkable.    -Colon normal with apparent appendectomy previously performed.      CT [facility] ([facility] - 2/11/2022:   -Trace bilateral pleural effusions.    -Hepatomegaly.   - 8mm low density lesion in the R hepatic lobe.    -1.2cm low density lesion in the R hepatic lobe near the IVC.   -Spleen and pancreas unremarkable.    -No cholelithiasis.    -No biliary dilation.    -Subcentimeter bilateral renal cysts.   - No bowel obstruction or inflammation.    -Small volume free fluid in the L paracolic gutter.    -Sclerotic and slightly lytic changes seen in bilateral SI joints.    [facility] - 2/13/2022:  -Several low T1 and high T2 signal masses in the liver.  #1: Segment 7 measuring 1cm with focus of peripheral nodular enhancement with internal septations and septal enhancement on more delayed post-contrast imaging.  Enhancement patter parallels the blood pool  #2.  Adjacent to IVC measuring 1.4cm - significant artifact overlying the mass.  Has internal enhancement with is progressive.  Enhancement patter parallels the blood pool.  Signal and enhancement characteristics of these lesions most suggestive of atypical hemangiomas.    -Few additional tiny lesions in the R hepatic lobe measuring <5mm.    -No intra or extrahepatic biliary dilation.    No cholelithiasis.    -Pancreas appears normal without PD dilation.    -Small bilateral, simple renal cysts.    -Adrenals unremarkable.     -No LAD.      Pathology:   None available for review    Labs:   Reviewed from 3/1/2022:  -HCV Ab: non-reactive  -HBV S Ab: positive  -HBV S Ag: non-reactive    Reviewed from 2/18/2022:  -CBC: WBC 5.9, H/H 11.8/37, Platelets 410  -BMP: Unremarkable electrolytes, Cr 0.7  -LFT: T bili 0.3, AST 25, ALT 31, Alk Phos 81, Albumin 4.6    Assessment and Plan:  Patient is a 25 y/o F with PMHx of anxiety, depression, chronic fatigue, ? Seizure disorder  referred to clinic for evaluation of liver lesions.  Today in clinic we reviewed her work-up to date including imaging and labs.  Diagrams and Cartoons were utilized to demonstrate the relevant anatomy.  We discussed that her imaging demonstrates multiple small liver lesions with the largest being <2cm.  These appear to have imaging characteristics which are consistent with hemangioma (radiology favors atypical hemagioma).  We discussed that hemangiomas are benign lesions made of atypical blood vessels which are typically incidentally found on imaging performed for other reasons.  These lesions have been present and essentially stable for at least 1 year (prior non-dedicated imaging - CT from 4/2021).  I reviewed with her that I think it is very unlikely these lesions have any relationship to her described systemic issues - however I do think is reasonable to repeat imaging in 6 months.  Should imaging be stable at that time it is posssible that no further surveillance imaging would be necessary.    She expressed some concern that 'additional lesions' we seen on the MRI (not seen on CT).  These lesions are all <1cm.  We reviewed that the MRI is dedicated liver imaging while her prior CT scans were non-dedicated - therefore it would be expected that smaller lesions would be able to be identified and does not mean that they developed in the interval between her CT and MRI (was 2 days).  We reviewed that now that she has a baseline MRI will plan repeat in 6 months for comparison.    I have encouraged her to continue follow-up with her PCP and Dr. Salas regarding her other GI symptoms for which work-up is already underway.  She reports that her EGD and colonoscopy were performed las week and we will work to obtain those records. She is scheduled to establish with an endocrinologist later today regarding her thyroid nodule.        Plan:  -Return to clinic in 6 months with repeat MRI, labs (orders placed today).   Patient will discuss pre-medication with her PCP given reported claustrophobia during last MRI given history of anxiety.    -Will obtain EGD, colonoscopy reports from last week.    The patient  asked several great questions which were answered to her satisfaction.  She is in agreement with the care plan as outlined above.    Whitney Aldrich MD  March 29, 2022, 9:59 AM    ADDENDUM:  Was able to obtain EGD/conoloscopy reports as well as pathology from 3/22/2022:  -EGD:  Esophagus normal.  Single 1.0cm umbilicated lesion in the gastric antrum (consistent with aberrant pancreas).  Stomach normal.  Duodenum normal.    -Colonoscopy: Perianal and BERTO WNL.  TI appeared normal.  Colon normal.  Non-bleeding internal hemorrhoids (grade 1).     Pathology without evidence of celiac, negative for H. Pylori, benign colonic mucosa.    Whitney Aldrich MD  April 15, 2022, 12:10 PM       Fluid bolus in progress

## 2023-03-14 NOTE — ED NOTES
Anesthesia Pre Eval Note    Anesthesia ROS/Med Hx        Anesthetic Complication History:  Patient does not have a history of anesthetic complications      Pulmonary Review:    The patient is a former smoker.     Neuro/Psych Review:    Positive for CVA - residual symptoms    Cardiovascular Review:    Positive for CHF  Positive for cardiomyopathy  Positive for pacemaker   Positive for CAD  Positive for CABG/stent  Positive for valvular problems/murmurs  Positive for PVD  Positive for KLINE/ SOB  Positive for hypertension  Positive for hyperlipidemia    GI/HEPATIC/RENAL Review:    Positive for GERD  Positive for renal disease - chronic renal insufficiency    End/Other Review:  Positive for diabetes - type 2 - poorly controlled  Positive for anemia  Positive for arthritis    Overall Review of Systems Comments:  ECHO 3/23 - EF 35%  S/P TAVR 2 YEARS AGO  CABG IN '94, 5 VESSEL      Relevant Problems   No relevant active problems       Physical Exam     Airway   Mallampati: II  TM Distance: >3 FB  Neck ROM: Full  Neck: Non-tender and Able to place in sniff position  TMJ Mobility: Good    Cardiovascular    Cardio Rhythm: Regular  Cardio Rate: Normal  Patient demonstrates: Murmur    Head Assessment  Head assessment: Normocephalic and Atraumatic    General Assessment  General Assessment: No acute distress    Dental Exam    Patient has:  Poor Dentition    Pulmonary Exam  Pulmonary exam normal    Abdominal Exam  Abdominal exam normal      Anesthesia Plan:    ASA Status: 4  Anesthesia Type: MAC    Premedication: None      Post-op Pain Management: Per Surgeon      Checklist  Reviewed: Past Med History, Allergies, Patient Summary, Medications, Nursing Notes, Problem list, NPO Status, EKG and Lab Results  Consent/Risks Discussed Statement:  The proposed anesthetic plan, including its risks and benefits, have been discussed with the Patient along with the risks and benefits of alternatives. Questions were encouraged and answered and  Report from Fiona MESSINA, assumed care of pt. Pt resting in gurney, respirations even and unlabored, able to speak in complete sentences. NAD noted. Pt states 6/10 pain.    the patient and/or representative understands and agrees to proceed.        I discussed with the patient (and/or patient's legal representative) the risks and benefits of the proposed anesthesia plan, MAC, which may include services performed by other anesthesia providers.    Alternative anesthesia plans, if available, were reviewed with the patient (and/or patient's legal representative). Discussion has been held with the patient (and/or patient's legal representative) regarding risks of anesthesia, which include emergent situations that may require change in anesthesia plan.  The patient (and/or patient's legal representative) has indicated understanding, his/her questions have been answered, and he/she wishes to proceed with the planned anesthetic.    Comments  Plan Comments: NO PREMEDS, EXCEPT REGULAR INSULIN 5 U IV FOR GLUCOSE     MAC, IF NEED TO CONVERT TO GENERAL, LMA SHOULD BE FINE    I have discussed with the patient (and/or patient's legal representative) that they are an appropriate candidate for anesthesia.  I discussed with the patient (and/or patient's legal representative) the risks and benefits of the proposed anesthesia plan, which may include services performed by other practitioners under direct or indirect supervision of the Anesthesiologist.  Alternative anesthesia plans were reviewed with the patient (and/or patient's legal representative) including an emergent situation that may require a change in the anesthesia plan. The following risks have been discussed with the patient (and/or patient's legal representative):  dental and oropharyngeal trauma, cardiac and pulmonary complication, peripheral paresthesias, intraoperative awareness, adverse reactions to medication and post-operative nausea and vomiting.  The patient (and/or patient's legal representative) has indicated they understand, their questions have been answered, and they wish to proceed.

## 2023-06-05 SDOH — HEALTH STABILITY: PHYSICAL HEALTH: ON AVERAGE, HOW MANY MINUTES DO YOU ENGAGE IN EXERCISE AT THIS LEVEL?: 70 MIN

## 2023-06-05 SDOH — ECONOMIC STABILITY: HOUSING INSECURITY

## 2023-06-05 SDOH — HEALTH STABILITY: PHYSICAL HEALTH: ON AVERAGE, HOW MANY DAYS PER WEEK DO YOU ENGAGE IN MODERATE TO STRENUOUS EXERCISE (LIKE A BRISK WALK)?: 4 DAYS

## 2023-06-05 SDOH — ECONOMIC STABILITY: TRANSPORTATION INSECURITY

## 2023-06-05 SDOH — HEALTH STABILITY: MENTAL HEALTH
STRESS IS WHEN SOMEONE FEELS TENSE, NERVOUS, ANXIOUS, OR CAN'T SLEEP AT NIGHT BECAUSE THEIR MIND IS TROUBLED. HOW STRESSED ARE YOU?: NOT AT ALL

## 2023-06-05 ASSESSMENT — SOCIAL DETERMINANTS OF HEALTH (SDOH)
HOW OFTEN DO YOU ATTEND CHURCH OR RELIGIOUS SERVICES?: NEVER
HOW OFTEN DO YOU GET TOGETHER WITH FRIENDS OR RELATIVES?: PATIENT DECLINED
DO YOU BELONG TO ANY CLUBS OR ORGANIZATIONS SUCH AS CHURCH GROUPS UNIONS, FRATERNAL OR ATHLETIC GROUPS, OR SCHOOL GROUPS?: NO
HOW OFTEN DO YOU ATTENT MEETINGS OF THE CLUB OR ORGANIZATION YOU BELONG TO?: NEVER
DO YOU BELONG TO ANY CLUBS OR ORGANIZATIONS SUCH AS CHURCH GROUPS UNIONS, FRATERNAL OR ATHLETIC GROUPS, OR SCHOOL GROUPS?: NO
HOW OFTEN DO YOU ATTENT MEETINGS OF THE CLUB OR ORGANIZATION YOU BELONG TO?: NEVER
HOW OFTEN DO YOU ATTEND CHURCH OR RELIGIOUS SERVICES?: NEVER
IN A TYPICAL WEEK, HOW MANY TIMES DO YOU TALK ON THE PHONE WITH FAMILY, FRIENDS, OR NEIGHBORS?: MORE THAN THREE TIMES A WEEK
IN A TYPICAL WEEK, HOW MANY TIMES DO YOU TALK ON THE PHONE WITH FAMILY, FRIENDS, OR NEIGHBORS?: MORE THAN THREE TIMES A WEEK
HOW OFTEN DO YOU GET TOGETHER WITH FRIENDS OR RELATIVES?: PATIENT DECLINED
HOW MANY DRINKS CONTAINING ALCOHOL DO YOU HAVE ON A TYPICAL DAY WHEN YOU ARE DRINKING: PATIENT DOES NOT DRINK

## 2023-06-05 ASSESSMENT — LIFESTYLE VARIABLES: HOW MANY STANDARD DRINKS CONTAINING ALCOHOL DO YOU HAVE ON A TYPICAL DAY: PATIENT DOES NOT DRINK

## 2023-06-06 ENCOUNTER — OFFICE VISIT (OUTPATIENT)
Dept: MEDICAL GROUP | Facility: LAB | Age: 28
End: 2023-06-06
Payer: COMMERCIAL

## 2023-06-06 VITALS
OXYGEN SATURATION: 99 % | DIASTOLIC BLOOD PRESSURE: 78 MMHG | SYSTOLIC BLOOD PRESSURE: 112 MMHG | HEART RATE: 84 BPM | BODY MASS INDEX: 26.81 KG/M2 | HEIGHT: 61 IN | TEMPERATURE: 98.4 F | RESPIRATION RATE: 16 BRPM | WEIGHT: 142 LBS

## 2023-06-06 DIAGNOSIS — Z13.220 LIPID SCREENING: ICD-10-CM

## 2023-06-06 DIAGNOSIS — E04.1 THYROID NODULE: ICD-10-CM

## 2023-06-06 DIAGNOSIS — Z13.1 DIABETES MELLITUS SCREENING: ICD-10-CM

## 2023-06-06 DIAGNOSIS — Z76.89 ENCOUNTER TO ESTABLISH CARE: ICD-10-CM

## 2023-06-06 DIAGNOSIS — Z13.29 SCREENING FOR THYROID DISORDER: ICD-10-CM

## 2023-06-06 DIAGNOSIS — K92.1 BLOODY STOOL: ICD-10-CM

## 2023-06-06 DIAGNOSIS — K76.9 LESION OF LIVER: ICD-10-CM

## 2023-06-06 PROCEDURE — 3074F SYST BP LT 130 MM HG: CPT | Performed by: PHYSICIAN ASSISTANT

## 2023-06-06 PROCEDURE — 99214 OFFICE O/P EST MOD 30 MIN: CPT | Performed by: PHYSICIAN ASSISTANT

## 2023-06-06 PROCEDURE — 3078F DIAST BP <80 MM HG: CPT | Performed by: PHYSICIAN ASSISTANT

## 2023-06-06 ASSESSMENT — FIBROSIS 4 INDEX: FIB4 SCORE: 0.51

## 2023-06-06 NOTE — PROGRESS NOTES
"Subjective:     CC:  There were no encounter diagnoses.    HISTORY OF THE PRESENT ILLNESS: Patient is a 27 y.o. female. This pleasant patient is here today to establish care. His/her prior PCP was Joan King.    \"Cancer scare\"  Prev established with surgical oncology for multiple liver cysts/suspected hemangiomas. Was lost to follow up  Due for updated MRI abd -will require oral sedation for MRI  Lately, patient reports she has been feeling sick and notes she has developed new onset petechiae  -bloody stools x 2 in 3 weeks  -episode of abd pain and nausea 3 weeks ago. Same symptoms developed next week with episode of bloody diarrhea. Similar episode happened the next week - liquid stool with clots of blood  -no bowel movement in the past 3 days   -colonoscopy/endoscopy in 2022  Dr Salas  -hx of celiac, patient reports she is compliant with celiac diet    Thyroid Concerns  Most recent thyroid US 02/0222 showed singular nodule, due for updated imaging for surveillance    Recent lifestyle change  Lost 44lbs  Working out  Eating healthy  Goal weight 130-125lbs   Follows with nutritionist/health       Feels like depression symptoms are well controlled  Some anxiety but feels like this is more related to life stressors        Health Maintenance     - Dyslipidemia (30-45): Ordered  - Diabetes (HTN, HLD, BMI >25): Ordered  - Depression screening (PHQ-2 and/or PHQ-9): See above  Substance Use: Denies  Tobacco Use/counseling: Former smoker, quit       Cancer screening  - Cervical CA (21-65): Due  -  HX Abnormal pap/HPV: none     Infectious disease screening/Immunizations  --Hepatitis C Screening (18 to 78 yo): Negative  --Immunizations: Due for updated HPV, COVID vaccines    Current Outpatient Medications Ordered in Epic   Medication Sig Dispense Refill    SUMAtriptan (IMITREX) 50 MG Tab Take 1 Tablet by mouth one time as needed for Migraine for up to 1 dose. 10 Tablet 0     No current Epic-ordered " "facility-administered medications on file.       Health Maintenance: Completed    ROS:   Gen: no fevers/chills, no changes in weight  Eyes: no changes in vision  ENT: no sore throat, no hearing loss, no bloody nose  Pulm: no sob, no cough  CV: no chest pain, no palpitations  GI: no nausea/vomiting, no diarrhea  : no dysuria  MSk: no myalgias  Skin: no rash  Neuro: no headaches, no numbness/tingling  Heme/Lymph: no easy bruising      Objective:       Exam: /78   Pulse 84   Temp 36.9 °C (98.4 °F)   Resp 16   Ht 1.549 m (5' 1\")   Wt 64.4 kg (142 lb)   SpO2 99%  Body mass index is 26.83 kg/m².    General: Normal appearing. No distress.  HEENT: Normocephalic. Eyes conjunctiva clear lids without ptosis, pupils equal and reactive to light accommodation, ears normal shape and contour, canals are clear bilaterally, tympanic membranes are benign, nasal mucosa benign, oropharynx is without erythema, edema or exudates.   Neck: Supple without JVD or bruit. Thyroid is not enlarged.  Pulmonary: Clear to ausculation.  Normal effort. No rales, ronchi, or wheezing.  Cardiovascular: Regular rate and rhythm without murmur. Carotid and radial pulses are intact and equal bilaterally.  Abdomen: Soft, nontender, nondistended. Normal bowel sounds. Liver and spleen are not palpable  Neurologic: Grossly nonfocal  Lymph: No cervical or supraclavicular lymph nodes are palpable  Skin: Warm and dry.  No obvious lesions.  Musculoskeletal: Normal gait. No extremity cyanosis, clubbing, or edema.  Psych: Normal mood and affect. Alert and oriented x3. Judgment and insight is normal.      Assessment & Plan:   27 y.o. female with the following -    1. Encounter to establish care  Labs per orders  Vaccinations per orders  Screenings per orders      2. Lipid screening  - CBC WITH DIFFERENTIAL; Future  - Comp Metabolic Panel; Future  - Lipid Profile; Future    3. Screening for thyroid disorder  - TSH WITH REFLEX TO FT4; Future    4. Diabetes " mellitus screening  - HEMOGLOBIN A1C; Future    5. Thyroid nodule  Due for updated surveillance imaging  - US-THYROID; Future    6. Bloody stool  Given new onset bloody stools, nausea, fatigue we will get some updated labs.  I also think patient would benefit from further evaluation with GI given bloody stools  - Referral to Gastroenterology    7. Lesion of liver  Due for updated imaging.  Per previous heme-onc note if cyst/lesions appear stable, likely benign hemangiomas and not require additional follow-up  - MR-ABDOMEN-WITH & W/O; Future      I spent a total of 25 minutes with record review, exam, communication with the patient, communication with other providers, and documentation of this encounter.    No follow-ups on file.    Please note that this dictation was created using voice recognition software. I have made every reasonable attempt to correct obvious errors, but I expect that there are errors of grammar and possibly content that I did not discover before finalizing the note.

## 2023-06-07 ASSESSMENT — PATIENT HEALTH QUESTIONNAIRE - PHQ9
2. FEELING DOWN, DEPRESSED, IRRITABLE, OR HOPELESS: NOT AT ALL
SUM OF ALL RESPONSES TO PHQ QUESTIONS 1-9: 0
5. POOR APPETITE OR OVEREATING: NOT AT ALL
6. FEELING BAD ABOUT YOURSELF - OR THAT YOU ARE A FAILURE OR HAVE LET YOURSELF OR YOUR FAMILY DOWN: NOT AL ALL
SUM OF ALL RESPONSES TO PHQ9 QUESTIONS 1 AND 2: 0
9. THOUGHTS THAT YOU WOULD BE BETTER OFF DEAD, OR OF HURTING YOURSELF: NOT AT ALL
7. TROUBLE CONCENTRATING ON THINGS, SUCH AS READING THE NEWSPAPER OR WATCHING TELEVISION: NOT AT ALL
4. FEELING TIRED OR HAVING LITTLE ENERGY: NOT AT ALL
8. MOVING OR SPEAKING SO SLOWLY THAT OTHER PEOPLE COULD HAVE NOTICED. OR THE OPPOSITE, BEING SO FIGETY OR RESTLESS THAT YOU HAVE BEEN MOVING AROUND A LOT MORE THAN USUAL: NOT AT ALL
3. TROUBLE FALLING OR STAYING ASLEEP OR SLEEPING TOO MUCH: NOT AT ALL
1. LITTLE INTEREST OR PLEASURE IN DOING THINGS: NOT AT ALL

## 2023-06-27 ENCOUNTER — HOSPITAL ENCOUNTER (OUTPATIENT)
Dept: RADIOLOGY | Facility: MEDICAL CENTER | Age: 28
End: 2023-06-27
Attending: PHYSICIAN ASSISTANT
Payer: COMMERCIAL

## 2023-06-27 ENCOUNTER — PATIENT MESSAGE (OUTPATIENT)
Dept: MEDICAL GROUP | Facility: LAB | Age: 28
End: 2023-06-27
Payer: COMMERCIAL

## 2023-06-27 DIAGNOSIS — E04.1 THYROID NODULE: ICD-10-CM

## 2023-06-27 PROCEDURE — 76536 US EXAM OF HEAD AND NECK: CPT

## 2023-07-09 ENCOUNTER — APPOINTMENT (OUTPATIENT)
Dept: RADIOLOGY | Facility: MEDICAL CENTER | Age: 28
End: 2023-07-09
Attending: PHYSICIAN ASSISTANT
Payer: COMMERCIAL

## 2023-07-20 ENCOUNTER — PATIENT MESSAGE (OUTPATIENT)
Dept: MEDICAL GROUP | Facility: LAB | Age: 28
End: 2023-07-20
Payer: COMMERCIAL

## 2023-07-20 DIAGNOSIS — F41.8 SITUATIONAL ANXIETY: ICD-10-CM

## 2023-07-21 RX ORDER — DIAZEPAM 5 MG/1
5 TABLET ORAL
Qty: 1 TABLET | Refills: 0 | Status: SHIPPED | OUTPATIENT
Start: 2023-07-21 | End: 2023-07-21

## 2023-07-30 ENCOUNTER — HOSPITAL ENCOUNTER (OUTPATIENT)
Dept: RADIOLOGY | Facility: MEDICAL CENTER | Age: 28
End: 2023-07-30
Attending: PHYSICIAN ASSISTANT
Payer: COMMERCIAL

## 2023-07-30 DIAGNOSIS — K76.9 LESION OF LIVER: ICD-10-CM

## 2023-07-30 PROCEDURE — 700117 HCHG RX CONTRAST REV CODE 255: Mod: JZ | Performed by: PHYSICIAN ASSISTANT

## 2023-07-30 PROCEDURE — A9581 GADOXETATE DISODIUM INJ: HCPCS | Mod: JZ | Performed by: PHYSICIAN ASSISTANT

## 2023-07-30 PROCEDURE — 74183 MRI ABD W/O CNTR FLWD CNTR: CPT

## 2023-07-30 RX ADMIN — GADOXETATE DISODIUM 10 ML: 181.43 INJECTION, SOLUTION INTRAVENOUS at 15:47

## 2023-07-31 DIAGNOSIS — J45.909 ASTHMA, UNSPECIFIED ASTHMA SEVERITY, UNSPECIFIED WHETHER COMPLICATED, UNSPECIFIED WHETHER PERSISTENT: ICD-10-CM

## 2023-07-31 DIAGNOSIS — E04.1 THYROID NODULE: ICD-10-CM

## 2023-09-08 ENCOUNTER — TELEPHONE (OUTPATIENT)
Dept: HEALTH INFORMATION MANAGEMENT | Facility: OTHER | Age: 28
End: 2023-09-08
Payer: COMMERCIAL

## 2023-10-04 ENCOUNTER — APPOINTMENT (OUTPATIENT)
Dept: RADIOLOGY | Facility: MEDICAL CENTER | Age: 28
End: 2023-10-04
Attending: EMERGENCY MEDICINE
Payer: COMMERCIAL

## 2023-10-04 ENCOUNTER — HOSPITAL ENCOUNTER (EMERGENCY)
Facility: MEDICAL CENTER | Age: 28
End: 2023-10-04
Attending: EMERGENCY MEDICINE
Payer: COMMERCIAL

## 2023-10-04 VITALS
TEMPERATURE: 98.1 F | DIASTOLIC BLOOD PRESSURE: 65 MMHG | WEIGHT: 144.18 LBS | RESPIRATION RATE: 16 BRPM | HEART RATE: 73 BPM | HEIGHT: 61 IN | SYSTOLIC BLOOD PRESSURE: 97 MMHG | OXYGEN SATURATION: 96 % | BODY MASS INDEX: 27.22 KG/M2

## 2023-10-04 DIAGNOSIS — D21.9 FIBROID: ICD-10-CM

## 2023-10-04 DIAGNOSIS — R11.2 NAUSEA AND VOMITING, UNSPECIFIED VOMITING TYPE: ICD-10-CM

## 2023-10-04 DIAGNOSIS — R10.9 ABDOMINAL CRAMPING: ICD-10-CM

## 2023-10-04 DIAGNOSIS — R19.7 DIARRHEA, UNSPECIFIED TYPE: ICD-10-CM

## 2023-10-04 DIAGNOSIS — N93.9 VAGINAL BLEEDING: ICD-10-CM

## 2023-10-04 LAB
ALBUMIN SERPL BCP-MCNC: 4.3 G/DL (ref 3.2–4.9)
ALBUMIN/GLOB SERPL: 1.2 G/DL
ALP SERPL-CCNC: 64 U/L (ref 30–99)
ALT SERPL-CCNC: 12 U/L (ref 2–50)
ANION GAP SERPL CALC-SCNC: 13 MMOL/L (ref 7–16)
APPEARANCE UR: CLEAR
AST SERPL-CCNC: 17 U/L (ref 12–45)
BACTERIA #/AREA URNS HPF: ABNORMAL /HPF
BASOPHILS # BLD AUTO: 0.4 % (ref 0–1.8)
BASOPHILS # BLD: 0.02 K/UL (ref 0–0.12)
BILIRUB SERPL-MCNC: 0.3 MG/DL (ref 0.1–1.5)
BILIRUB UR QL STRIP.AUTO: NEGATIVE
BUN SERPL-MCNC: 11 MG/DL (ref 8–22)
CALCIUM ALBUM COR SERPL-MCNC: 8.7 MG/DL (ref 8.5–10.5)
CALCIUM SERPL-MCNC: 8.9 MG/DL (ref 8.4–10.2)
CHLORIDE SERPL-SCNC: 104 MMOL/L (ref 96–112)
CO2 SERPL-SCNC: 22 MMOL/L (ref 20–33)
COLOR UR: YELLOW
CREAT SERPL-MCNC: 0.72 MG/DL (ref 0.5–1.4)
EOSINOPHIL # BLD AUTO: 0.05 K/UL (ref 0–0.51)
EOSINOPHIL NFR BLD: 0.9 % (ref 0–6.9)
EPI CELLS #/AREA URNS HPF: ABNORMAL /HPF
ERYTHROCYTE [DISTWIDTH] IN BLOOD BY AUTOMATED COUNT: 41.1 FL (ref 35.9–50)
GFR SERPLBLD CREATININE-BSD FMLA CKD-EPI: 115 ML/MIN/1.73 M 2
GLOBULIN SER CALC-MCNC: 3.5 G/DL (ref 1.9–3.5)
GLUCOSE SERPL-MCNC: 83 MG/DL (ref 65–99)
GLUCOSE UR STRIP.AUTO-MCNC: NEGATIVE MG/DL
HCG SERPL QL: NEGATIVE
HCT VFR BLD AUTO: 38.6 % (ref 37–47)
HGB BLD-MCNC: 12.5 G/DL (ref 12–16)
IMM GRANULOCYTES # BLD AUTO: 0.01 K/UL (ref 0–0.11)
IMM GRANULOCYTES NFR BLD AUTO: 0.2 % (ref 0–0.9)
KETONES UR STRIP.AUTO-MCNC: NEGATIVE MG/DL
LEUKOCYTE ESTERASE UR QL STRIP.AUTO: NEGATIVE
LIPASE SERPL-CCNC: 33 U/L (ref 11–82)
LYMPHOCYTES # BLD AUTO: 1.53 K/UL (ref 1–4.8)
LYMPHOCYTES NFR BLD: 28.5 % (ref 22–41)
MCH RBC QN AUTO: 28.1 PG (ref 27–33)
MCHC RBC AUTO-ENTMCNC: 32.4 G/DL (ref 32.2–35.5)
MCV RBC AUTO: 86.7 FL (ref 81.4–97.8)
MICRO URNS: ABNORMAL
MONOCYTES # BLD AUTO: 0.35 K/UL (ref 0–0.85)
MONOCYTES NFR BLD AUTO: 6.5 % (ref 0–13.4)
MUCOUS THREADS #/AREA URNS HPF: ABNORMAL /HPF
NEUTROPHILS # BLD AUTO: 3.41 K/UL (ref 1.82–7.42)
NEUTROPHILS NFR BLD: 63.5 % (ref 44–72)
NITRITE UR QL STRIP.AUTO: NEGATIVE
NRBC # BLD AUTO: 0 K/UL
NRBC BLD-RTO: 0 /100 WBC (ref 0–0.2)
PH UR STRIP.AUTO: 5 [PH] (ref 5–8)
PLATELET # BLD AUTO: 331 K/UL (ref 164–446)
PMV BLD AUTO: 10.6 FL (ref 9–12.9)
POTASSIUM SERPL-SCNC: 3.9 MMOL/L (ref 3.6–5.5)
PROT SERPL-MCNC: 7.8 G/DL (ref 6–8.2)
PROT UR QL STRIP: NEGATIVE MG/DL
RBC # BLD AUTO: 4.45 M/UL (ref 4.2–5.4)
RBC # URNS HPF: ABNORMAL /HPF
RBC UR QL AUTO: ABNORMAL
SODIUM SERPL-SCNC: 139 MMOL/L (ref 135–145)
SP GR UR STRIP.AUTO: 1.02
WBC # BLD AUTO: 5.4 K/UL (ref 4.8–10.8)
WBC #/AREA URNS HPF: ABNORMAL /HPF

## 2023-10-04 PROCEDURE — 96375 TX/PRO/DX INJ NEW DRUG ADDON: CPT

## 2023-10-04 PROCEDURE — 80053 COMPREHEN METABOLIC PANEL: CPT

## 2023-10-04 PROCEDURE — 85025 COMPLETE CBC W/AUTO DIFF WBC: CPT

## 2023-10-04 PROCEDURE — 81001 URINALYSIS AUTO W/SCOPE: CPT

## 2023-10-04 PROCEDURE — 36415 COLL VENOUS BLD VENIPUNCTURE: CPT

## 2023-10-04 PROCEDURE — 96374 THER/PROPH/DIAG INJ IV PUSH: CPT | Mod: XU

## 2023-10-04 PROCEDURE — 700105 HCHG RX REV CODE 258: Performed by: EMERGENCY MEDICINE

## 2023-10-04 PROCEDURE — 76830 TRANSVAGINAL US NON-OB: CPT

## 2023-10-04 PROCEDURE — 74177 CT ABD & PELVIS W/CONTRAST: CPT

## 2023-10-04 PROCEDURE — 700117 HCHG RX CONTRAST REV CODE 255: Performed by: EMERGENCY MEDICINE

## 2023-10-04 PROCEDURE — 84703 CHORIONIC GONADOTROPIN ASSAY: CPT

## 2023-10-04 PROCEDURE — 700111 HCHG RX REV CODE 636 W/ 250 OVERRIDE (IP): Mod: JZ | Performed by: EMERGENCY MEDICINE

## 2023-10-04 PROCEDURE — 83690 ASSAY OF LIPASE: CPT

## 2023-10-04 PROCEDURE — 99285 EMERGENCY DEPT VISIT HI MDM: CPT

## 2023-10-04 RX ORDER — ONDANSETRON 2 MG/ML
4 INJECTION INTRAMUSCULAR; INTRAVENOUS ONCE
Status: COMPLETED | OUTPATIENT
Start: 2023-10-04 | End: 2023-10-04

## 2023-10-04 RX ORDER — SODIUM CHLORIDE 9 MG/ML
1000 INJECTION, SOLUTION INTRAVENOUS ONCE
Status: COMPLETED | OUTPATIENT
Start: 2023-10-04 | End: 2023-10-04

## 2023-10-04 RX ORDER — KETOROLAC TROMETHAMINE 30 MG/ML
15 INJECTION, SOLUTION INTRAMUSCULAR; INTRAVENOUS ONCE
Status: COMPLETED | OUTPATIENT
Start: 2023-10-04 | End: 2023-10-04

## 2023-10-04 RX ORDER — IBUPROFEN 200 MG
400 TABLET ORAL 3 TIMES DAILY PRN
COMMUNITY

## 2023-10-04 RX ORDER — DICYCLOMINE HCL 20 MG
20 TABLET ORAL EVERY 6 HOURS
Qty: 28 TABLET | Refills: 0 | Status: SHIPPED | OUTPATIENT
Start: 2023-10-04 | End: 2023-10-11

## 2023-10-04 RX ORDER — MORPHINE SULFATE 4 MG/ML
4 INJECTION INTRAVENOUS ONCE
Status: COMPLETED | OUTPATIENT
Start: 2023-10-04 | End: 2023-10-04

## 2023-10-04 RX ORDER — ONDANSETRON 4 MG/1
4 TABLET, ORALLY DISINTEGRATING ORAL EVERY 6 HOURS PRN
Qty: 10 TABLET | Refills: 0 | Status: SHIPPED | OUTPATIENT
Start: 2023-10-04

## 2023-10-04 RX ORDER — DICYCLOMINE HCL 20 MG
20 TABLET ORAL ONCE
Status: DISCONTINUED | OUTPATIENT
Start: 2023-10-04 | End: 2023-10-04 | Stop reason: HOSPADM

## 2023-10-04 RX ADMIN — IOHEXOL 100 ML: 350 INJECTION, SOLUTION INTRAVENOUS at 14:14

## 2023-10-04 RX ADMIN — MORPHINE SULFATE 4 MG: 4 INJECTION INTRAVENOUS at 13:44

## 2023-10-04 RX ADMIN — ONDANSETRON 4 MG: 2 INJECTION INTRAMUSCULAR; INTRAVENOUS at 13:43

## 2023-10-04 RX ADMIN — KETOROLAC TROMETHAMINE 15 MG: 30 INJECTION, SOLUTION INTRAMUSCULAR; INTRAVENOUS at 16:41

## 2023-10-04 RX ADMIN — SODIUM CHLORIDE 1000 ML: 9 INJECTION, SOLUTION INTRAVENOUS at 13:42

## 2023-10-04 ASSESSMENT — FIBROSIS 4 INDEX: FIB4 SCORE: 0.51

## 2023-10-04 NOTE — ED PROVIDER NOTES
"ED Provider Note    CHIEF COMPLAINT  Chief Complaint   Patient presents with    Abdominal Pain     Reports epigastric abd pain with nausea and diarrhea \"with yellow fatty stuff in it and streaks of blood\". Reports sx intermittent x3 weeks. States she \"went backpacking 3 weeks ago and drank some water, I think I got giardia\", states s/o with similar sx.       EXTERNAL RECORDS REVIEWED  Other None    HPI/ROS  LIMITATION TO HISTORY   Select: : None  OUTSIDE HISTORIAN(S):  None    Sadia Troncoso is a 27 y.o. female who presents with a chief complaint of epigastric abdominal pain with associated nausea, vomiting, and diarrhea.  About 4 weeks ago, on September 8, she and her boyfriend went camping.  They did drink stream water.  On the 10th both she and her significant other developed abdominal cramping with nausea and vomiting.  The symptoms largely resolved after 2 days but since that time she has had intermittent streaks of blood in her stool.  Three days ago she developed worsening abdominal cramping and then yesterday began vomiting and having multiple episodes of loose stool.  She does have a history of C. difficile but denies any recent antibiotic use.  She has had chills but has not taken her temperature and denies any known fever.  She has no chest pain, shortness of breath, dysuria.  She denies chance of pregnancy.  She does not drink alcohol, use marijuana, or illicit substances.    PAST MEDICAL HISTORY   has a past medical history of Anxiety, Depression, Headache, Liver disease, and Seizure disorder (HCC).    SURGICAL HISTORY   has a past surgical history that includes appendectomy.    FAMILY HISTORY  Family History   Problem Relation Age of Onset    Seizures Maternal Grandmother     Colorectal Cancer Maternal Grandmother     Cancer Maternal Grandmother         Leukemia    Seizures Maternal Grandfather     Colorectal Cancer Maternal Grandfather     Seizures Paternal Grandmother     Seizures Paternal " "Grandfather     Lung Cancer Daughter        SOCIAL HISTORY  Social History     Tobacco Use    Smoking status: Former     Current packs/day: 0.25     Average packs/day: 0.3 packs/day for 2.0 years (0.5 ttl pk-yrs)     Types: Cigarettes    Smokeless tobacco: Never   Vaping Use    Vaping Use: Never used   Substance and Sexual Activity    Alcohol use: Not Currently    Drug use: Not Currently     Types: Inhaled     Comment: THC CBD    Sexual activity: Yes     Partners: Male       CURRENT MEDICATIONS  Home Medications       Reviewed by Vanessa Segal R.N. (Registered Nurse) on 10/04/23 at 1209  Med List Status: Not Addressed     Medication Last Dose Status        Patient Sarthak Taking any Medications                           ALLERGIES  No Known Allergies    PHYSICAL EXAM  VITAL SIGNS: /79   Pulse 85   Temp 36.9 °C (98.4 °F) (Temporal)   Resp 20   Ht 1.549 m (5' 1\")   Wt 65.4 kg (144 lb 2.9 oz)   LMP 10/04/2023   SpO2 99%   BMI 27.24 kg/m²    Physical Exam  Vitals and nursing note reviewed.   Constitutional:       Appearance: She is well-developed.   HENT:      Head: Normocephalic and atraumatic.      Mouth/Throat:      Comments: Dry mucous membranes  Cardiovascular:      Rate and Rhythm: Normal rate and regular rhythm.      Heart sounds: Normal heart sounds.   Pulmonary:      Effort: Pulmonary effort is normal.      Breath sounds: Normal breath sounds.   Abdominal:      General: Abdomen is flat.      Comments: No peritoneal signs.  Bilateral upper quadrant tenderness.   Genitourinary:     Vagina: Normal.      Cervix: No cervical motion tenderness or friability.      Adnexa:         Right: No tenderness.          Left: No tenderness.        Comments: Small amount of blood in the vaginal vault with minimal extrusion through cervical os.  Skin:     General: Skin is warm and dry.   Neurological:      General: No focal deficit present.      Mental Status: She is alert and oriented to person, place, and time. "   Psychiatric:         Mood and Affect: Mood normal.         Behavior: Behavior normal.       DIAGNOSTIC STUDIES / PROCEDURES  LABS  Results for orders placed or performed during the hospital encounter of 10/04/23   CBC WITH DIFFERENTIAL   Result Value Ref Range    WBC 5.4 4.8 - 10.8 K/uL    RBC 4.45 4.20 - 5.40 M/uL    Hemoglobin 12.5 12.0 - 16.0 g/dL    Hematocrit 38.6 37.0 - 47.0 %    MCV 86.7 81.4 - 97.8 fL    MCH 28.1 27.0 - 33.0 pg    MCHC 32.4 32.2 - 35.5 g/dL    RDW 41.1 35.9 - 50.0 fL    Platelet Count 331 164 - 446 K/uL    MPV 10.6 9.0 - 12.9 fL    Neutrophils-Polys 63.50 44.00 - 72.00 %    Lymphocytes 28.50 22.00 - 41.00 %    Monocytes 6.50 0.00 - 13.40 %    Eosinophils 0.90 0.00 - 6.90 %    Basophils 0.40 0.00 - 1.80 %    Immature Granulocytes 0.20 0.00 - 0.90 %    Nucleated RBC 0.00 0.00 - 0.20 /100 WBC    Neutrophils (Absolute) 3.41 1.82 - 7.42 K/uL    Lymphs (Absolute) 1.53 1.00 - 4.80 K/uL    Monos (Absolute) 0.35 0.00 - 0.85 K/uL    Eos (Absolute) 0.05 0.00 - 0.51 K/uL    Baso (Absolute) 0.02 0.00 - 0.12 K/uL    Immature Granulocytes (abs) 0.01 0.00 - 0.11 K/uL    NRBC (Absolute) 0.00 K/uL   COMP METABOLIC PANEL   Result Value Ref Range    Sodium 139 135 - 145 mmol/L    Potassium 3.9 3.6 - 5.5 mmol/L    Chloride 104 96 - 112 mmol/L    Co2 22 20 - 33 mmol/L    Anion Gap 13.0 7.0 - 16.0    Glucose 83 65 - 99 mg/dL    Bun 11 8 - 22 mg/dL    Creatinine 0.72 0.50 - 1.40 mg/dL    Calcium 8.9 8.4 - 10.2 mg/dL    Correct Calcium 8.7 8.5 - 10.5 mg/dL    AST(SGOT) 17 12 - 45 U/L    ALT(SGPT) 12 2 - 50 U/L    Alkaline Phosphatase 64 30 - 99 U/L    Total Bilirubin 0.3 0.1 - 1.5 mg/dL    Albumin 4.3 3.2 - 4.9 g/dL    Total Protein 7.8 6.0 - 8.2 g/dL    Globulin 3.5 1.9 - 3.5 g/dL    A-G Ratio 1.2 g/dL   LIPASE   Result Value Ref Range    Lipase 33 11 - 82 U/L   HCG QUAL SERUM   Result Value Ref Range    Beta-Hcg Qualitative Serum Negative Negative   URINALYSIS,CULTURE IF INDICATED    Specimen: Urine   Result  Value Ref Range    Color Yellow     Character Clear     Specific Gravity 1.025 <1.035    Ph 5.0 5.0 - 8.0    Glucose Negative Negative mg/dL    Ketones Negative Negative mg/dL    Protein Negative Negative mg/dL    Bilirubin Negative Negative    Nitrite Negative Negative    Leukocyte Esterase Negative Negative    Occult Blood Moderate (A) Negative    Micro Urine Req Microscopic    CRYPTO/GIARDIA RAPID ASSAY    Specimen: Stool   Result Value Ref Range    Significant Indicator NEG     Source STL     Site STOOL     Ova And Parasites Antigen Eia -    URINE MICROSCOPIC (W/UA)   Result Value Ref Range    WBC Rare /hpf    RBC 20-50 (A) /hpf    Bacteria Few (A) None /hpf    Epithelial Cells Few Few /hpf    Mucous Threads Few /hpf   ESTIMATED GFR   Result Value Ref Range    GFR (CKD-EPI) 115 >60 mL/min/1.73 m 2     RADIOLOGY  I have independently interpreted the diagnostic imaging associated with this visit and am waiting the final reading from the radiologist.   My preliminary interpretation is as follows: No nephro/ureterolithiasis  Radiologist interpretation:   US-PELVIC COMPLETE (TRANSABDOMINAL/TRANSVAGINAL) (COMBO)   Final Result      1.  2.7 x 2.2 x 2.1 cm uterine fibroid.      2.  No evidence of adnexal mass.      CT-ABDOMEN-PELVIS WITH   Final Result      1.  No evidence of bowel obstruction or focal inflammatory change.      2.  Multiple small hepatic hemangiomata.      3.  Uterine fibroid.      4.  Bilateral sacroiliitis.        COURSE & MEDICAL DECISION MAKING    ED Observation Status? Yes; I am placing the patient in to an observation status due to a diagnostic uncertainty as well as therapeutic intensity. Patient placed in observation status at 1:13 PM, 10/4/2023.     Observation plan is as follows: Labs, imaging, medication    Upon Reevaluation, the patient's condition has: Improved; and will be discharged.    Patient discharged from ED Observation status at 4:49 PM (Time) 10/4/2023 (Date).     INITIAL  ASSESSMENT, COURSE AND PLAN  Care Narrative: This is a 27 year old female who is here with abdominal cramping, diarrhea, and nausea.     DDx includes, but is not limited to, viral syndrome, IBS, IBD, infectious diarrhea, colitis, pancreatitis, gastritis, PUD, perforated peptic ulcer, cholecystitis.    Arrives AF with normal VS. Appears dehydrated but non-toxic. Abdomen is soft with tenderness in the bilateral upper quadrants. Despite the tenderness there are no peritoneal signs.    Started on IV fluids and given a dose of pain/nausea medication with improvement in symptoms.    CBC is reassuring without leukocytosis or anemia. Metabolic panel normal across the board. Lipase is normal. UA reveals blood - patient is currently menstruating - and has few bacteria but patient denies any dysuria, urinary frequency, or urinary urgency. HCG is negative.    CT abd/pelvis was obtained to evaluate for focal inflammation to suggest colitis - infectious or inflammatory. This did not reveal acute abnormality.    Patient was reevaluated at bedside. We discussed lab/imaging results. She has been unable to provide me with a stool sample. I placed an outpatient order for stool culture and crypto/giardia. I recommended follow up with her PMD and plan was for prescription for bentyl. However, just prior to the patient leaving the ER, she asked to speak with me again and told me that she was too embarrassed to tell me initially but she has been having heavy vaginal bleeding for the past year. She goes through 4-5 tampons per hour. She is concerned that the fibroid noted on her CT scan may be the etiology of her symptoms. I performed a pelvic exam that was reassuring. No masses, CMT, or adnexal tenderness were noted. There is blood in the vaginal vault but no brisk bleeding. She does not wish for STI testing or empiric treatment. TVUS was obtained that confirmed a uterine fibroid which may be the source of her heavy bleeding and cramping.  She will need close outpatient gynecology follow up and a referral was placed on her behalf. Discharged in good and stable condition with strict return precautions.    HYDRATION: Based on the patient's presentation of Dehydration the patient was given IV fluids. IV Hydration was used because oral hydration was not adequate alone. Upon recheck following hydration, the patient was improved.    ADDITIONAL PROBLEM LIST  None  DISPOSITION AND DISCUSSIONS  I have discussed management of the patient with the following physicians and TWAN's:  None    Discussion of management with other QHP or appropriate source(s): None     Escalation of care considered, and ultimately not performed:acute inpatient care management, however at this time, the patient is most appropriate for outpatient management    Barriers to care at this time, including but not limited to:  None .     Decision tools and prescription drugs considered including, but not limited to:  Bentyl .    FINAL DIAGNOSIS  1. Diarrhea, unspecified type    2. Abdominal cramping    3. Nausea and vomiting, unspecified vomiting type    4. Vaginal bleeding    5. Fibroid      Electronically signed by: Mauricio Dee M.D., 10/4/2023 1:13 PM

## 2023-10-04 NOTE — DISCHARGE INSTRUCTIONS
You were seen in the ER for abdominal cramping, nausea, and diarrhea.  Thankfully, your labs and imaging did not reveal an acute abnormality that requires further work-up, consultation, or admission to the hospital.  I have placed an order for outpatient stool studies, you can get them through any [facility] hospital or lab.  I sent in a prescription for Bentyl and Zofran on your behalf, take them as directed.  You also have a uterine fibroid which is likely the reason for your heavy vaginal bleeding.  I have placed a referral to gynecology on your behalf, please follow-up immediately.  Please follow-up with your primary care physician as we did not find the reason for your symptoms today and if they persist you may ultimately require a GI consultation/referral.  Return with any new or worsening symptoms.  I hope you feel better soon!

## 2023-10-04 NOTE — ED NOTES
Med rec is complete per Patient at bedside.     Allergies reviewed.    Has patient had any outside antibiotics in the last 30 days? N    Any Anticoagulants (rivaroxaban, apixaban, edoxaban, dabigatran, warfarin, enoxaparin) taken in the last 14 days? N     Chemo or Outpatient Infusions? N       Preferred pharmacy for this visit : [facility] [phone]

## 2023-10-04 NOTE — ED NOTES
MD at  for re-evaluation on a new concern pt has   new orders to be placed   pt to have pelvic exam and US per MD   VI NS infusing well

## 2023-10-05 NOTE — ED NOTES
Pt rec'ed good effect after IV Toradol that was given   pt feeling much better  MD re-evaluated pt and she is cleared for d/c  dischg instructions given to pt  verbally understands  pt's mother here to drive pt home  aware she needs to f/u w/ Gyn as soon as possible  d/c'ed to home in NAD

## 2023-10-11 ENCOUNTER — APPOINTMENT (OUTPATIENT)
Dept: MEDICAL GROUP | Facility: LAB | Age: 28
End: 2023-10-11
Payer: COMMERCIAL